# Patient Record
Sex: MALE | Race: WHITE | NOT HISPANIC OR LATINO | Employment: FULL TIME | URBAN - METROPOLITAN AREA
[De-identification: names, ages, dates, MRNs, and addresses within clinical notes are randomized per-mention and may not be internally consistent; named-entity substitution may affect disease eponyms.]

---

## 2017-07-20 ENCOUNTER — CLINICAL SUPPORT (OUTPATIENT)
Dept: OCCUPATIONAL MEDICINE | Facility: CLINIC | Age: 57
End: 2017-07-20

## 2017-07-20 DIAGNOSIS — Z02.1 PRE-EMPLOYMENT DRUG SCREENING: Primary | ICD-10-CM

## 2017-07-20 LAB — POC BREATH ALCOHOL: NEGATIVE

## 2017-07-20 PROCEDURE — 82075 ASSAY OF BREATH ETHANOL: CPT | Mod: S$GLB,,, | Performed by: NURSE PRACTITIONER

## 2017-07-20 PROCEDURE — 80305 DRUG TEST PRSMV DIR OPT OBS: CPT | Mod: S$GLB,,, | Performed by: NURSE PRACTITIONER

## 2017-07-24 ENCOUNTER — OFFICE VISIT (OUTPATIENT)
Dept: FAMILY MEDICINE | Facility: CLINIC | Age: 57
End: 2017-07-24
Payer: COMMERCIAL

## 2017-07-24 VITALS
BODY MASS INDEX: 39.79 KG/M2 | HEIGHT: 73 IN | DIASTOLIC BLOOD PRESSURE: 80 MMHG | SYSTOLIC BLOOD PRESSURE: 120 MMHG | WEIGHT: 300.25 LBS | TEMPERATURE: 98 F | HEART RATE: 66 BPM | OXYGEN SATURATION: 96 %

## 2017-07-24 DIAGNOSIS — R53.83 FATIGUE, UNSPECIFIED TYPE: ICD-10-CM

## 2017-07-24 DIAGNOSIS — E66.9 OBESITY (BMI 30-39.9): ICD-10-CM

## 2017-07-24 DIAGNOSIS — Z12.11 SCREENING FOR MALIGNANT NEOPLASM OF COLON: ICD-10-CM

## 2017-07-24 DIAGNOSIS — M25.572 CHRONIC PAIN OF LEFT ANKLE: ICD-10-CM

## 2017-07-24 DIAGNOSIS — M79.671 CHRONIC PAIN OF RIGHT HEEL: ICD-10-CM

## 2017-07-24 DIAGNOSIS — Z00.00 ANNUAL PHYSICAL EXAM: Primary | ICD-10-CM

## 2017-07-24 DIAGNOSIS — G89.29 CHRONIC PAIN OF RIGHT HEEL: ICD-10-CM

## 2017-07-24 DIAGNOSIS — G89.29 CHRONIC PAIN OF LEFT ANKLE: ICD-10-CM

## 2017-07-24 DIAGNOSIS — M50.30 DEGENERATIVE CERVICAL DISC: ICD-10-CM

## 2017-07-24 PROCEDURE — 99999 PR PBB SHADOW E&M-EST. PATIENT-LVL V: CPT | Mod: PBBFAC,,, | Performed by: FAMILY MEDICINE

## 2017-07-24 PROCEDURE — 99386 PREV VISIT NEW AGE 40-64: CPT | Mod: S$GLB,,, | Performed by: FAMILY MEDICINE

## 2017-07-24 RX ORDER — HYDROCODONE BITARTRATE AND ACETAMINOPHEN 7.5; 325 MG/1; MG/1
1 TABLET ORAL
Qty: 30 TABLET | Refills: 0 | Status: SHIPPED | OUTPATIENT
Start: 2017-07-24 | End: 2017-09-15 | Stop reason: SDUPTHER

## 2017-07-24 RX ORDER — GABAPENTIN 300 MG/1
300 CAPSULE ORAL 2 TIMES DAILY
Qty: 180 CAPSULE | Refills: 1 | Status: SHIPPED | OUTPATIENT
Start: 2017-07-24 | End: 2017-12-26 | Stop reason: SDUPTHER

## 2017-07-24 RX ORDER — NAPROXEN 500 MG/1
500 TABLET ORAL 2 TIMES DAILY
Qty: 60 TABLET | Refills: 0 | Status: SHIPPED | OUTPATIENT
Start: 2017-07-24 | End: 2017-09-15 | Stop reason: SDUPTHER

## 2017-07-24 NOTE — PROGRESS NOTES
Routine Office Visit    Patient Name: Srinivasan Munoz    : 1960  MRN: [id number]    Subjective:  Srinivasan is a 57 y.o. male who presents today for     1. Annual exam / establish care / new to me  2. Multiple arthralgias - pt has chronic right heel pain and chronic left ankle pain from previous injuries / fractures he has had in the past. He regularly sees orthopedist who have recommended that pt have surgery but pt unable to do so at this time due to need to work. He is unable to take time off for surgery. He states he has been working long 12 hour days and has had increase pain and swelling of both of his feet and ankles. Pt has been taking aleve with minimal relief of symptoms. No history of gout  3. Cervical neck pain - causing left elbow pain and numbness. Pt has tried elevating his elbow while driving with some relief of symptoms. He was previously given gabapentin in the past which helped with pain. Would like to restart   4. Fatigue / decrease sexual drive - pt is taking an otc supplement to help with low testosterone. He has not checked but feels he has been very tired, has decreased energy, increase weight gain and decrease sexual drive. He recently started otc supplement 2 weeks ago and has not notice noticeable improvement.     Review of Systems   Constitutional: Positive for malaise/fatigue. Negative for chills and fever.   HENT: Negative for congestion.    Eyes: Negative for blurred vision.   Respiratory: Negative for cough.    Cardiovascular: Negative for chest pain.   Gastrointestinal: Negative for abdominal pain, constipation, diarrhea, heartburn, nausea and vomiting.   Genitourinary: Negative for dysuria.   Musculoskeletal: Positive for joint pain and neck pain. Negative for myalgias.   Skin: Negative for itching and rash.   Neurological: Positive for weakness. Negative for dizziness and headaches.   Psychiatric/Behavioral: Negative for depression.       Active Problem List  Patient Active  "Problem List   Diagnosis    Obesity (BMI 30-39.9)    Chronic pain of right heel    Chronic pain of left ankle    Degenerative cervical disc       Past Surgical History  Past Surgical History:   Procedure Laterality Date    EYE SURGERY         Family History  History reviewed. No pertinent family history.    Social History  Social History     Social History    Marital status:      Spouse name: N/A    Number of children: N/A    Years of education: N/A     Occupational History    Not on file.     Social History Main Topics    Smoking status: Current Every Day Smoker     Packs/day: 2.00     Types: Cigarettes    Smokeless tobacco: Never Used    Alcohol use No    Drug use: Unknown    Sexual activity: Not on file     Other Topics Concern    Not on file     Social History Narrative    No narrative on file       Medications and Allergies  Reviewed and updated.   Current Outpatient Prescriptions   Medication Sig    gabapentin (NEURONTIN) 300 MG capsule Take 1 capsule (300 mg total) by mouth 2 (two) times daily.    hydrocodone-acetaminophen 7.5-325mg (NORCO) 7.5-325 mg per tablet Take 1 tablet by mouth every 24 hours as needed for Pain.    naproxen (NAPROSYN) 500 MG tablet Take 1 tablet (500 mg total) by mouth 2 (two) times daily.     No current facility-administered medications for this visit.        Physical Exam  /80 (BP Location: Right arm, Patient Position: Sitting, BP Method: Manual)   Pulse 66   Temp 98.1 °F (36.7 °C) (Oral)   Ht 6' 1" (1.854 m)   Wt (!) 136.2 kg (300 lb 4.3 oz)   SpO2 96%   BMI 39.62 kg/m²   Physical Exam   Constitutional: He is oriented to person, place, and time. He appears well-developed and well-nourished.   HENT:   Head: Normocephalic and atraumatic.   Eyes: Conjunctivae and EOM are normal. Pupils are equal, round, and reactive to light.   Neck: Normal range of motion. Neck supple. No JVD present. No thyromegaly present.   Cardiovascular: Normal rate, regular " rhythm and normal heart sounds.    Pulmonary/Chest: Effort normal and breath sounds normal. He has no wheezes.   Abdominal: Soft. Bowel sounds are normal. He exhibits no distension. There is no tenderness. There is no guarding.   Musculoskeletal: Normal range of motion.   Lymphadenopathy:     He has no cervical adenopathy.   Neurological: He is alert and oriented to person, place, and time.   Skin: Skin is warm and dry.   Psychiatric: He has a normal mood and affect. His behavior is normal.         Assessment/Plan:  Srinivasan Munoz is a 57 y.o. male who presents today for :    Problem List Items Addressed This Visit        Neuro    Chronic pain of right heel (Chronic)    Overview     Hx of fracture  Followed by ortho - who recommended surgery; pt decline  Continue conservative treatment with nsaids          Relevant Medications    naproxen (NAPROSYN) 500 MG tablet    hydrocodone-acetaminophen 7.5-325mg (NORCO) 7.5-325 mg per tablet  Common side effects of this medication were discussed with the patient. Questions regarding medications were discussed during this visit.       Chronic pain of left ankle (Chronic)    Overview     Hx of fracture  Followed by ortho - recommended surgery  Conservative treatment          Relevant Medications    naproxen (NAPROSYN) 500 MG tablet    hydrocodone-acetaminophen 7.5-325mg (NORCO) 7.5-325 mg per tablet  Common side effects of this medication were discussed with the patient. Questions regarding medications were discussed during this visit.   Advise medication should only be used for severe pain.        Fluids/Electrolytes/Nutrition/GI    Obesity (BMI 30-39.9) (Chronic)    Current Assessment & Plan     BMI noted  The patient is asked to make an attempt to improve diet and exercise patterns to aid in medical management of this problem.  Pt likes to eat little debbies with peanut butter; he also drinks 4-5 energy drinks per day  Recommend moderation and decrease snacks   Recommend  increase protein.             Musculoskeletal and Integument    Degenerative cervical disc    Overview     With radiculopathy  Pt previously on gabapentin; would like to restart         Relevant Medications    gabapentin (NEURONTIN) 300 MG capsule      Other Visit Diagnoses     Annual physical exam    -  Primary    Relevant Orders    Lipid panel    TSH    CBC auto differential    Comprehensive metabolic panel    Hemoglobin A1c    PSA, Screening    Uric acid    Screening for malignant neoplasm of colon        Relevant Orders    Case request GI: COLONOSCOPY (Completed)    Fatigue, unspecified type        Relevant Orders    Testosterone, free          Return in about 6 months (around 1/24/2018).

## 2017-07-24 NOTE — ASSESSMENT & PLAN NOTE
BMI noted  The patient is asked to make an attempt to improve diet and exercise patterns to aid in medical management of this problem.

## 2017-07-28 ENCOUNTER — LAB VISIT (OUTPATIENT)
Dept: LAB | Facility: HOSPITAL | Age: 57
End: 2017-07-28
Attending: FAMILY MEDICINE
Payer: COMMERCIAL

## 2017-07-28 DIAGNOSIS — Z00.00 ANNUAL PHYSICAL EXAM: ICD-10-CM

## 2017-07-28 DIAGNOSIS — R53.83 FATIGUE, UNSPECIFIED TYPE: ICD-10-CM

## 2017-07-28 LAB
ALBUMIN SERPL BCP-MCNC: 3.9 G/DL
ALP SERPL-CCNC: 98 U/L
ALT SERPL W/O P-5'-P-CCNC: 29 U/L
ANION GAP SERPL CALC-SCNC: 9 MMOL/L
AST SERPL-CCNC: 22 U/L
BASOPHILS # BLD AUTO: 0.09 K/UL
BASOPHILS NFR BLD: 0.9 %
BILIRUB SERPL-MCNC: 0.4 MG/DL
BUN SERPL-MCNC: 17 MG/DL
CALCIUM SERPL-MCNC: 9.4 MG/DL
CHLORIDE SERPL-SCNC: 108 MMOL/L
CHOLEST/HDLC SERPL: 4.2 {RATIO}
CO2 SERPL-SCNC: 25 MMOL/L
COMPLEXED PSA SERPL-MCNC: 0.24 NG/ML
CREAT SERPL-MCNC: 0.9 MG/DL
DIFFERENTIAL METHOD: ABNORMAL
EOSINOPHIL # BLD AUTO: 0.1 K/UL
EOSINOPHIL NFR BLD: 1 %
ERYTHROCYTE [DISTWIDTH] IN BLOOD BY AUTOMATED COUNT: 13.7 %
EST. GFR  (AFRICAN AMERICAN): >60 ML/MIN/1.73 M^2
EST. GFR  (NON AFRICAN AMERICAN): >60 ML/MIN/1.73 M^2
ESTIMATED AVG GLUCOSE: 117 MG/DL
GLUCOSE SERPL-MCNC: 115 MG/DL
HBA1C MFR BLD HPLC: 5.7 %
HCT VFR BLD AUTO: 45.2 %
HDL/CHOLESTEROL RATIO: 24 %
HDLC SERPL-MCNC: 167 MG/DL
HDLC SERPL-MCNC: 40 MG/DL
HGB BLD-MCNC: 14.8 G/DL
LDLC SERPL CALC-MCNC: 106.2 MG/DL
LYMPHOCYTES # BLD AUTO: 2.6 K/UL
LYMPHOCYTES NFR BLD: 25.7 %
MCH RBC QN AUTO: 28.6 PG
MCHC RBC AUTO-ENTMCNC: 32.7 G/DL
MCV RBC AUTO: 87 FL
MONOCYTES # BLD AUTO: 1.2 K/UL
MONOCYTES NFR BLD: 12.2 %
NEUTROPHILS # BLD AUTO: 6 K/UL
NEUTROPHILS NFR BLD: 59.7 %
NONHDLC SERPL-MCNC: 127 MG/DL
PLATELET # BLD AUTO: 320 K/UL
PMV BLD AUTO: 9.9 FL
POTASSIUM SERPL-SCNC: 4.3 MMOL/L
PROT SERPL-MCNC: 6.8 G/DL
RBC # BLD AUTO: 5.17 M/UL
SODIUM SERPL-SCNC: 142 MMOL/L
TRIGL SERPL-MCNC: 104 MG/DL
TSH SERPL DL<=0.005 MIU/L-ACNC: 2.37 UIU/ML
URATE SERPL-MCNC: 7.1 MG/DL
WBC # BLD AUTO: 10.04 K/UL

## 2017-07-28 PROCEDURE — 84402 ASSAY OF FREE TESTOSTERONE: CPT

## 2017-07-28 PROCEDURE — 85025 COMPLETE CBC W/AUTO DIFF WBC: CPT

## 2017-07-28 PROCEDURE — 83036 HEMOGLOBIN GLYCOSYLATED A1C: CPT

## 2017-07-28 PROCEDURE — 36415 COLL VENOUS BLD VENIPUNCTURE: CPT | Mod: PO

## 2017-07-28 PROCEDURE — 84443 ASSAY THYROID STIM HORMONE: CPT

## 2017-07-28 PROCEDURE — 80053 COMPREHEN METABOLIC PANEL: CPT

## 2017-07-28 PROCEDURE — 84153 ASSAY OF PSA TOTAL: CPT

## 2017-07-28 PROCEDURE — 84550 ASSAY OF BLOOD/URIC ACID: CPT

## 2017-07-28 PROCEDURE — 80061 LIPID PANEL: CPT

## 2017-07-31 LAB — TESTOST FREE SERPL-MCNC: 7.8 PG/ML

## 2017-08-01 DIAGNOSIS — R79.89 LOW TESTOSTERONE IN MALE: Primary | ICD-10-CM

## 2017-08-03 ENCOUNTER — TELEPHONE (OUTPATIENT)
Dept: FAMILY MEDICINE | Facility: CLINIC | Age: 57
End: 2017-08-03

## 2017-08-15 ENCOUNTER — OFFICE VISIT (OUTPATIENT)
Dept: UROLOGY | Facility: CLINIC | Age: 57
End: 2017-08-15
Payer: COMMERCIAL

## 2017-08-15 VITALS
WEIGHT: 299.63 LBS | SYSTOLIC BLOOD PRESSURE: 125 MMHG | BODY MASS INDEX: 39.71 KG/M2 | DIASTOLIC BLOOD PRESSURE: 82 MMHG | HEIGHT: 73 IN | HEART RATE: 65 BPM

## 2017-08-15 DIAGNOSIS — N52.9 ERECTILE DYSFUNCTION, UNSPECIFIED ERECTILE DYSFUNCTION TYPE: Primary | ICD-10-CM

## 2017-08-15 PROCEDURE — 99204 OFFICE O/P NEW MOD 45 MIN: CPT | Mod: S$GLB,,, | Performed by: UROLOGY

## 2017-08-15 PROCEDURE — 3008F BODY MASS INDEX DOCD: CPT | Mod: S$GLB,,, | Performed by: UROLOGY

## 2017-08-15 PROCEDURE — 99999 PR PBB SHADOW E&M-EST. PATIENT-LVL III: CPT | Mod: PBBFAC,,, | Performed by: UROLOGY

## 2017-08-15 RX ORDER — SILDENAFIL CITRATE 20 MG/1
20 TABLET ORAL 3 TIMES DAILY
Qty: 90 TABLET | Refills: 11 | Status: SHIPPED | OUTPATIENT
Start: 2017-08-15 | End: 2018-06-19 | Stop reason: CLARIF

## 2017-08-15 NOTE — PROGRESS NOTES
Subjective:       Patient ID: Srinivasan Munoz is a 57 y.o. male.    Chief Complaint: Low Testosterone (referred by  low testosterone levels on labs)    HPI patient is here for evaluation of erectile dysfunction.  He has a low free testosterone.  He has normal libido and is healthy but he does smoke cigarettes and I have asked him to stop smoking.  He has difficulty obtaining and maintaining an erection.  He's never tried sildenafil    Past Medical History:   Diagnosis Date    Fractured bone     right foot       Past Surgical History:   Procedure Laterality Date    EYE SURGERY         Family History   Problem Relation Age of Onset    Heart disease Father     Hypertension Father     Diabetes Mother     Stroke Mother     Cancer Mother     Hypertension Mother     Stroke Brother     Diabetes Brother        Social History     Social History    Marital status:      Spouse name: N/A    Number of children: N/A    Years of education: N/A     Occupational History    Not on file.     Social History Main Topics    Smoking status: Current Every Day Smoker     Packs/day: 2.00     Types: Cigarettes    Smokeless tobacco: Never Used    Alcohol use No    Drug use: No    Sexual activity: Yes     Partners: Female     Other Topics Concern    Not on file     Social History Narrative    No narrative on file       Allergies:  Penicillins    Medications:    Current Outpatient Prescriptions:     gabapentin (NEURONTIN) 300 MG capsule, Take 1 capsule (300 mg total) by mouth 2 (two) times daily., Disp: 180 capsule, Rfl: 1    hydrocodone-acetaminophen 7.5-325mg (NORCO) 7.5-325 mg per tablet, Take 1 tablet by mouth every 24 hours as needed for Pain., Disp: 30 tablet, Rfl: 0    naproxen (NAPROSYN) 500 MG tablet, Take 1 tablet (500 mg total) by mouth 2 (two) times daily., Disp: 60 tablet, Rfl: 0    sildenafil (REVATIO) 20 mg Tab, Take 1 tablet (20 mg total) by mouth 3 (three) times daily., Disp: 90 tablet,  Rfl: 11    Review of Systems   Constitutional: Negative for activity change, appetite change, chills, diaphoresis, fatigue, fever and unexpected weight change.   HENT: Negative for congestion, dental problem, hearing loss, mouth sores, postnasal drip, rhinorrhea, sinus pressure and trouble swallowing.    Eyes: Negative for pain, discharge and itching.   Respiratory: Negative for apnea, cough, choking, chest tightness, shortness of breath and wheezing.    Cardiovascular: Negative for chest pain, palpitations and leg swelling.   Gastrointestinal: Negative for abdominal distention, abdominal pain, anal bleeding, blood in stool, constipation, diarrhea, nausea, rectal pain and vomiting.   Endocrine: Negative for polydipsia and polyuria.   Genitourinary: Negative for decreased urine volume, difficulty urinating, discharge, dysuria, enuresis, flank pain, frequency, genital sores, hematuria, penile pain, penile swelling, scrotal swelling, testicular pain and urgency.   Musculoskeletal: Negative for arthralgias, back pain and myalgias.   Skin: Negative for color change, rash and wound.   Neurological: Negative for dizziness, syncope, speech difficulty, light-headedness and headaches.   Hematological: Negative for adenopathy. Does not bruise/bleed easily.   Psychiatric/Behavioral: Negative for behavioral problems, confusion, hallucinations and sleep disturbance.       Objective:      Physical Exam   Constitutional: He appears well-developed.   HENT:   Head: Normocephalic.   Cardiovascular: Normal rate.    Pulmonary/Chest: Effort normal.   Abdominal: Soft.   Genitourinary: Prostate normal.   Genitourinary Comments: 30 g benign no nodules.  PSA is normal   Neurological: He is alert.   Skin: Skin is warm.     Psychiatric: He has a normal mood and affect.       Assessment:       1. Erectile dysfunction, unspecified erectile dysfunction type        Plan:       Srinivasan was seen today for low testosterone.    Diagnoses and all  orders for this visit:    Erectile dysfunction, unspecified erectile dysfunction type  -     Testosterone; Future    Other orders  -     sildenafil (REVATIO) 20 mg Tab; Take 1 tablet (20 mg total) by mouth 3 (three) times daily.        patient will take 520 mg sildenafil's 1 hour prior to intercourse

## 2017-08-15 NOTE — LETTER
August 15, 2017      Yolanda Marques MD  [address]  [place] [address]           [facility]  [address]  [place] [address]  Phone: [phone]  Fax: [phone]          Patient: Srinivasan Munoz   MR Number: [id number]   YOB: 1960   Date of Visit: 8/15/2017       Dear Dr. Yolanda Marques:    Thank you for referring Srinivasan Munoz to me for evaluation. Attached you will find relevant portions of my assessment and plan of care.    If you have questions, please do not hesitate to call me. I look forward to following Srinivasan Munoz along with you.    Sincerely,    Johnny Bender Jr., MD    Enclosure  CC:  No Recipients    If you would like to receive this communication electronically, please contact [email or web address] or [phone] to request more information on [employer] access.    For providers and/or their staff who would like to refer a patient to [facility], please contact us through our one-stop-shop provider referral line, [facility], at [phone].    If you feel you have received this communication in error or would no longer like to receive these types of communications, please e-mail [email or web address]

## 2017-08-18 ENCOUNTER — OFFICE VISIT (OUTPATIENT)
Dept: FAMILY MEDICINE | Facility: CLINIC | Age: 57
End: 2017-08-18
Payer: COMMERCIAL

## 2017-08-18 ENCOUNTER — LAB VISIT (OUTPATIENT)
Dept: LAB | Facility: HOSPITAL | Age: 57
End: 2017-08-18
Attending: UROLOGY
Payer: COMMERCIAL

## 2017-08-18 VITALS
HEIGHT: 73 IN | WEIGHT: 302 LBS | TEMPERATURE: 97 F | OXYGEN SATURATION: 96 % | SYSTOLIC BLOOD PRESSURE: 110 MMHG | BODY MASS INDEX: 40.02 KG/M2 | HEART RATE: 96 BPM | DIASTOLIC BLOOD PRESSURE: 80 MMHG

## 2017-08-18 DIAGNOSIS — M75.22 BICEPS TENDONITIS, LEFT: ICD-10-CM

## 2017-08-18 DIAGNOSIS — M77.12 LATERAL EPICONDYLITIS OF LEFT ELBOW: ICD-10-CM

## 2017-08-18 DIAGNOSIS — N52.9 ERECTILE DYSFUNCTION, UNSPECIFIED ERECTILE DYSFUNCTION TYPE: ICD-10-CM

## 2017-08-18 DIAGNOSIS — E66.9 OBESITY (BMI 30-39.9): Chronic | ICD-10-CM

## 2017-08-18 DIAGNOSIS — M50.30 DEGENERATIVE CERVICAL DISC: Primary | ICD-10-CM

## 2017-08-18 LAB — TESTOST SERPL-MCNC: 325 NG/DL

## 2017-08-18 PROCEDURE — 3008F BODY MASS INDEX DOCD: CPT | Mod: S$GLB,,, | Performed by: FAMILY MEDICINE

## 2017-08-18 PROCEDURE — 99999 PR PBB SHADOW E&M-EST. PATIENT-LVL III: CPT | Mod: PBBFAC,,, | Performed by: FAMILY MEDICINE

## 2017-08-18 PROCEDURE — 84403 ASSAY OF TOTAL TESTOSTERONE: CPT

## 2017-08-18 PROCEDURE — 99214 OFFICE O/P EST MOD 30 MIN: CPT | Mod: S$GLB,,, | Performed by: FAMILY MEDICINE

## 2017-08-18 PROCEDURE — 36415 COLL VENOUS BLD VENIPUNCTURE: CPT | Mod: PO

## 2017-08-18 RX ORDER — CYCLOBENZAPRINE HCL 10 MG
10 TABLET ORAL NIGHTLY
Qty: 90 TABLET | Refills: 1 | Status: SHIPPED | OUTPATIENT
Start: 2017-08-18 | End: 2017-08-28

## 2017-08-18 NOTE — PATIENT INSTRUCTIONS
Understanding Biceps Tendonitis (Distal)    The biceps is the muscle on the front of the upper arm. Biceps tendons are connective tissue that attaches this muscle to the bones of the shoulder and arm. Overuse or a sudden injury to tendons can cause pain. When this problem occurs at the inner elbow, it is known as distal biceps tendonitis. Distal refers to the elbow end of the biceps, because it is farther away from center of the body.  Causes of biceps tendonitis  The biceps tendons can be damaged by:  · Lifting too heavy a weight  · Using your arms more than usual, such as training harder for a sport or doing a task that requires a lot of lifting  · Low-level stress on the tendons over time, especially repetitive motions  · Other injuries to the arm or elbow  Symptoms of biceps tendonitis  These may include:  · Pain or tenderness at the front of the elbow  · Pain that gets worse when bending the elbow or rotating the forearm  · Arm weakness  · A crackling sound or grating feeling when moving the elbow  Treatment for biceps tendonitis  This problem often gets better with rest and medicines. Treatments aim to reduce pain and help the tendon heal. Possible treatments include:  · Avoiding actions that make the pain worse to allow the elbow to rest  · Using over-the-counter or prescription pain medicine to help relieve pain and swelling  · Applying cold packs to help relieve pain and swelling  · Trying stretches and other exercises to help with range of motion and strength  If these treatments dont do enough to relieve symptoms, physical therapy may be helpful. For severe symptoms, or if the tendon ruptures, your healthcare provider may advise surgery to repair the tendon.       When to call your healthcare provider  Call your healthcare provider right away if you have any of these:  · Fever of 100.4°F (38°C) or higher, or as directed  · Symptoms that dont get better, or get worse  · New symptoms  · Bruising or  swelling of the injured arm  · Bulging appearance to the biceps muscle on the injured arm   Date Last Reviewed: 3/10/2016  © 5185-1398 [employer]. [address] [place] [address], [place] [address]. All rights reserved. This information is not intended as a substitute for professional medical care. Always follow your healthcare professional's instructions.        Tennis Elbow  Muscles connect to bones by thick, fibrous cords (tendons). When the muscles are overused by repeated motion, the tendons may become inflamed and painful. This condition is called tendonitis.  Tennis elbow (lateral epicondylitis) is a form of tendonitis. It occurs when the forearm muscles are used again and again in a twisting motion. Pain from tennis elbow occurs mainly on the outside of the elbow. But the pain can spread into the forearm and wrist. Your elbow may also be swollen and tender to the touch.  The pain may get worse when you move your arm or do simple activities. Bending your wrist back, shaking hands, or turning a doorknob may cause pain. The pain often gets worse after several weeks or months. Sometimes you may feel pain when your arm is still.  Tennis players who use a backhand stroke with poor technique are more likely to get tennis elbow. But playing tennis is only one cause of tennis elbow. Other common activities that can cause it include:  · Hammering  · Painting  · Raking  Besides tennis players, people at risk include , gardeners, musicians, and dentists. Sometimes people get tennis elbow without doing anything that would cause the injury.  Treatment includes resting the arm and taking anti-inflammatory medicines. Special splints help ease symptoms. Symptoms should get better after 4 to 6 weeks of rest. You may need steroid injections if resting and using a splint dont help. After the pain is relieved, you should change your activities so the symptoms dont return. You may need physical therapy. It may  include stretching, range-of-motion, and strengthening exercises. These treatments help most cases. You may need surgery if your symptoms continue for 6 months.  Home care  Follow these guidelines when caring for yourself at home:  · Rest your elbow as needed. Protect it from movement that causes pain. You may be told to use a forearm splint at night to ease symptoms in the morning. Your health care provider may recommend a special wrap or splint to compress the muscles of the forearm. This can ease pain during daytime activities. As your symptoms get better, start to move your elbow more.  · Put an ice pack on the injured area. Do this for 20 minutes every 1 to 2 hours the first day for pain relief. You can make an ice pack by wrapping a plastic bag of ice cubes in a thin towel. Continue using the ice pack 3 to 4 times a day for the next 2 days. Then use the ice pack as needed to ease pain and swelling.  · You may use acetaminophen or ibuprofen to control pain, unless another pain medicine was prescribed. If you have chronic liver or kidney disease, talk with your health care provider before using these medicines. Also talk with your provider if youve had a stomach ulcer or GI bleeding.  · After your elbow heals, avoid the motion that caused your pain. Or learn to move in a way that causes less stress on the tendon. Using a forearm wrap may keep tennis elbow from happening again.  · A tennis elbow strap may ease pain and keep you from further injury when you start playing tennis again. You can also lower your risk for injury by warming up before you play and cooling down afterward. You should also use the right equipment. For instance, make sure your racquet has the right  and is the right size for you.  Follow-up care  Follow up with your health care provider, or as advised, if your symptoms dont get better after 1 week of treatment.  When to seek medical advice  Call your health care provider right away if  any of these occur:  · Redness over the painful area  · Pain or swelling at the elbow gets worse  · Any numbness or tingling in your arm, hands, or fingers  · Unexplained fever over 101ºF (37.8ºC)   Date Last Reviewed: 2/17/2015  © 0533-6622 [employer]. [address] [place], [place] [address]. All rights reserved. This information is not intended as a substitute for professional medical care. Always follow your healthcare professional's instructions.

## 2017-08-19 NOTE — PROGRESS NOTES
Routine Office Visit    Patient Name: Srinivasan Munoz    : 1960  MRN: [id number]    Subjective:  Srinivasan is a 57 y.o. male who presents today for     1. Left elbow pain - started 1 month ago. Pain is described as an ache / cramp  that is severe and radiates from his elbow to his shoulder. Pain is worse at night and wakes patient up in the middle of the night. He has been taking neurontin and nsaids without relief of symptoms. He bought a compression wrap which seems to help with the pain. Pt works in construction. He denies any trauma or injuries. He does have two disc in his neck that are touching and he is wondering if that is the cause of his elbow pain.     Review of Systems   Constitutional: Negative for chills and fever.   HENT: Negative for congestion.    Eyes: Negative for blurred vision.   Respiratory: Negative for cough.    Cardiovascular: Negative for chest pain.   Gastrointestinal: Negative for abdominal pain, constipation, diarrhea, heartburn, nausea and vomiting.   Genitourinary: Negative for dysuria.   Musculoskeletal: Positive for joint pain. Negative for myalgias.   Skin: Negative for itching and rash.   Neurological: Negative for dizziness and headaches.   Psychiatric/Behavioral: Negative for depression.       Active Problem List  Patient Active Problem List   Diagnosis    Obesity (BMI 30-39.9)    Chronic pain of right heel    Chronic pain of left ankle    Degenerative cervical disc       Past Surgical History  Past Surgical History:   Procedure Laterality Date    EYE SURGERY         Family History  Family History   Problem Relation Age of Onset    Heart disease Father     Hypertension Father     Diabetes Mother     Stroke Mother     Cancer Mother     Hypertension Mother     Stroke Brother     Diabetes Brother        Social History  Social History     Social History    Marital status:      Spouse name: N/A    Number of children: N/A    Years of education: N/A  "    Occupational History    Not on file.     Social History Main Topics    Smoking status: Current Every Day Smoker     Packs/day: 2.00     Types: Cigarettes    Smokeless tobacco: Never Used    Alcohol use No    Drug use: No    Sexual activity: Yes     Partners: Female     Other Topics Concern    Not on file     Social History Narrative    No narrative on file       Medications and Allergies  Reviewed and updated.   Current Outpatient Prescriptions   Medication Sig    gabapentin (NEURONTIN) 300 MG capsule Take 1 capsule (300 mg total) by mouth 2 (two) times daily.    hydrocodone-acetaminophen 7.5-325mg (NORCO) 7.5-325 mg per tablet Take 1 tablet by mouth every 24 hours as needed for Pain.    naproxen (NAPROSYN) 500 MG tablet Take 1 tablet (500 mg total) by mouth 2 (two) times daily.    sildenafil (REVATIO) 20 mg Tab Take 1 tablet (20 mg total) by mouth 3 (three) times daily.    cyclobenzaprine (FLEXERIL) 10 MG tablet Take 1 tablet (10 mg total) by mouth every evening.     No current facility-administered medications for this visit.        Physical Exam  /80 (BP Location: Right arm, Patient Position: Sitting, BP Method: Large (Manual))   Pulse 96   Temp 97 °F (36.1 °C) (Oral)   Ht 6' 1" (1.854 m)   Wt (!) 137 kg (302 lb 0.5 oz)   SpO2 96%   BMI 39.85 kg/m²   Physical Exam   Constitutional: He is oriented to person, place, and time. He appears well-developed and well-nourished.   HENT:   Head: Normocephalic and atraumatic.   Eyes: Conjunctivae and EOM are normal. Pupils are equal, round, and reactive to light.   Neck: Normal range of motion. Neck supple. No JVD present. No thyromegaly present.   Cardiovascular: Normal rate, regular rhythm and normal heart sounds.    Pulmonary/Chest: Effort normal and breath sounds normal. He has no wheezes.   Abdominal: Soft. Bowel sounds are normal. He exhibits no distension. There is no tenderness. There is no guarding.   Musculoskeletal: Normal range of " motion.        Left elbow: He exhibits normal range of motion and no swelling. Tenderness found.   Lymphadenopathy:     He has no cervical adenopathy.   Neurological: He is alert and oriented to person, place, and time.   Skin: Skin is warm and dry.   Psychiatric: He has a normal mood and affect. His behavior is normal.         Assessment/Plan:  Srinivasan Munoz is a 57 y.o. male who presents today for :    Degenerative cervical disc  Still causes patient pain / numbness / tingling  Continue neurontin   May need to increase dosage of neurontin; will try starting flexeril first  Common side effects of this medication were discussed with the patient. Questions regarding medications were discussed during this visit.     Obesity (BMI 30-39.9)  BMI noted     Biceps tendonitis, left / Lateral epicondylitis of left elbow  -     cyclobenzaprine (FLEXERIL) 10 MG tablet; Take 1 tablet (10 mg total) by mouth every evening.  Dispense: 90 tablet; Refill: 1  RICE therapy  Conservative treatment; try eagle brand medicated oil   Continue nsaids prn         Return if symptoms worsen or fail to improve.

## 2017-09-15 DIAGNOSIS — M25.572 CHRONIC PAIN OF LEFT ANKLE: ICD-10-CM

## 2017-09-15 DIAGNOSIS — M79.671 CHRONIC PAIN OF RIGHT HEEL: ICD-10-CM

## 2017-09-15 DIAGNOSIS — G89.29 CHRONIC PAIN OF RIGHT HEEL: ICD-10-CM

## 2017-09-15 DIAGNOSIS — G89.29 CHRONIC PAIN OF LEFT ANKLE: ICD-10-CM

## 2017-09-15 RX ORDER — NAPROXEN 500 MG/1
500 TABLET ORAL 2 TIMES DAILY
Qty: 60 TABLET | Refills: 0 | Status: SHIPPED | OUTPATIENT
Start: 2017-09-15 | End: 2017-11-11 | Stop reason: SDUPTHER

## 2017-09-15 RX ORDER — HYDROCODONE BITARTRATE AND ACETAMINOPHEN 7.5; 325 MG/1; MG/1
1 TABLET ORAL
Qty: 30 TABLET | Refills: 0 | Status: SHIPPED | OUTPATIENT
Start: 2017-09-15 | End: 2018-01-03 | Stop reason: SDUPTHER

## 2017-09-15 NOTE — TELEPHONE ENCOUNTER
----- Message from Chico Velazquez sent at 9/15/2017 10:08 AM CDT -----  Contact: Self  REFILL: hydrocodone-acetaminophen 7.5-325mg (NORCO) 7.5-325 mg per tablet  naproxen (NAPROSYN) 500 MG tablet    Please notify pt when complete. Pt can be reached @ [phone].

## 2017-11-11 DIAGNOSIS — G89.29 CHRONIC PAIN OF RIGHT HEEL: ICD-10-CM

## 2017-11-11 DIAGNOSIS — M79.671 CHRONIC PAIN OF RIGHT HEEL: ICD-10-CM

## 2017-11-11 DIAGNOSIS — G89.29 CHRONIC PAIN OF LEFT ANKLE: ICD-10-CM

## 2017-11-11 DIAGNOSIS — M25.572 CHRONIC PAIN OF LEFT ANKLE: ICD-10-CM

## 2017-11-12 RX ORDER — NAPROXEN 500 MG/1
TABLET ORAL
Qty: 60 TABLET | Refills: 0 | Status: SHIPPED | OUTPATIENT
Start: 2017-11-12 | End: 2018-01-03 | Stop reason: SDUPTHER

## 2017-12-26 ENCOUNTER — TELEPHONE (OUTPATIENT)
Dept: FAMILY MEDICINE | Facility: CLINIC | Age: 57
End: 2017-12-26

## 2017-12-26 DIAGNOSIS — G89.29 CHRONIC PAIN OF RIGHT HEEL: ICD-10-CM

## 2017-12-26 DIAGNOSIS — M50.30 DEGENERATIVE CERVICAL DISC: Primary | ICD-10-CM

## 2017-12-26 DIAGNOSIS — G89.29 CHRONIC PAIN OF LEFT ANKLE: ICD-10-CM

## 2017-12-26 DIAGNOSIS — M50.30 DEGENERATIVE CERVICAL DISC: ICD-10-CM

## 2017-12-26 DIAGNOSIS — M25.572 CHRONIC PAIN OF LEFT ANKLE: ICD-10-CM

## 2017-12-26 DIAGNOSIS — M79.671 CHRONIC PAIN OF RIGHT HEEL: ICD-10-CM

## 2017-12-26 RX ORDER — GABAPENTIN 300 MG/1
300 CAPSULE ORAL 2 TIMES DAILY
Qty: 180 CAPSULE | Refills: 1 | Status: SHIPPED | OUTPATIENT
Start: 2017-12-26 | End: 2018-01-04

## 2017-12-26 RX ORDER — GABAPENTIN 300 MG/1
CAPSULE ORAL
Qty: 180 CAPSULE | Refills: 1 | Status: SHIPPED | OUTPATIENT
Start: 2017-12-26 | End: 2017-12-26 | Stop reason: SDUPTHER

## 2017-12-26 NOTE — TELEPHONE ENCOUNTER
----- Message from Yolanda Marques MD sent at 12/26/2017  1:49 PM CST -----  Will forward this to staff. Please send as refill request. Thank you    ----- Message -----  From: Tamiko Santiago  Sent: 12/26/2017   1:20 PM  To: Yolanda Marques MD    Pt ask that Dr. marques nurse to please call him about getting a refill on his meds ASAP pt is out of his meds. [phone] (Gabapentin 300MG cap amn

## 2018-01-03 NOTE — TELEPHONE ENCOUNTER
----- Message from Oksana Pizarro sent at 1/3/2018  4:04 PM CST -----  Contact: Self  Pt calling regarding med below. [phone] or [phone].        gabapentin (NEURONTIN) 300 MG capsule

## 2018-01-03 NOTE — TELEPHONE ENCOUNTER
Patient requesting 800 mg tablet of gabapentin once daily instead of taking two tablets 2 times daily.     Patient has an appt with Dr Marques scheduled on 01/16/18 10:30am.     Please Advise

## 2018-01-04 RX ORDER — NAPROXEN 500 MG/1
500 TABLET ORAL 2 TIMES DAILY
Qty: 180 TABLET | Refills: 0 | Status: SHIPPED | OUTPATIENT
Start: 2018-01-04 | End: 2018-04-16 | Stop reason: SDUPTHER

## 2018-01-04 RX ORDER — GABAPENTIN 600 MG/1
600 TABLET ORAL 2 TIMES DAILY
Qty: 180 TABLET | Refills: 0 | Status: SHIPPED | OUTPATIENT
Start: 2018-01-04 | End: 2018-09-10 | Stop reason: SDUPTHER

## 2018-01-04 RX ORDER — GABAPENTIN 800 MG/1
800 TABLET ORAL DAILY
Qty: 90 TABLET | Refills: 1 | Status: SHIPPED | OUTPATIENT
Start: 2018-01-04 | End: 2018-01-04

## 2018-01-04 RX ORDER — HYDROCODONE BITARTRATE AND ACETAMINOPHEN 7.5; 325 MG/1; MG/1
1 TABLET ORAL
Qty: 30 TABLET | Refills: 0 | Status: SHIPPED | OUTPATIENT
Start: 2018-01-04 | End: 2018-04-16 | Stop reason: SDUPTHER

## 2018-01-04 NOTE — TELEPHONE ENCOUNTER
Patient said he take Gabapentin 4 times a day he take 2 in the am and 2 in pm he new Rx sent to pharmacy.

## 2018-03-27 ENCOUNTER — OFFICE VISIT (OUTPATIENT)
Dept: OPTOMETRY | Facility: CLINIC | Age: 58
End: 2018-03-27
Payer: COMMERCIAL

## 2018-03-27 DIAGNOSIS — H25.12 NUCLEAR SCLEROSIS OF LEFT EYE: ICD-10-CM

## 2018-03-27 DIAGNOSIS — H52.7 REFRACTIVE ERROR: ICD-10-CM

## 2018-03-27 DIAGNOSIS — H21.531: ICD-10-CM

## 2018-03-27 DIAGNOSIS — T85.22XA DISLOCATED INTRAOCULAR LENS, INITIAL ENCOUNTER: Primary | ICD-10-CM

## 2018-03-27 PROCEDURE — 99999 PR PBB SHADOW E&M-EST. PATIENT-LVL II: CPT | Mod: PBBFAC,,, | Performed by: OPTOMETRIST

## 2018-03-27 PROCEDURE — 92015 DETERMINE REFRACTIVE STATE: CPT | Mod: S$GLB,,, | Performed by: OPTOMETRIST

## 2018-03-27 PROCEDURE — 92004 COMPRE OPH EXAM NEW PT 1/>: CPT | Mod: S$GLB,,, | Performed by: OPTOMETRIST

## 2018-03-27 NOTE — PROGRESS NOTES
Subjective:       Patient ID: Srinivasan Munoz is a 58 y.o. male      Chief Complaint   Patient presents with    Concerns About Ocular Health     History of Present Illness  Dls: ? Yrs     Pt c/o blurry vision at distance and near ou. Pt does not wear any   glasses.  Pt states no tearing  itching ou burning ou no pain no ha's no floaters.     Eye meds:  None         Assessment/Plan:     1. Dislocated intraocular lens, initial encounter  Inferiorly decentered. Pt reports vision use to be better in the past. Discussed with pt, referral to Dr. Pa for evaluation.  - Ambulatory referral to Ophthalmology    2. Nuclear sclerosis of left eye  Educated pt on presence of cataracts and effects on vision. No surgery at this time. Recheck in one year.    3. Traumatic iridodialysis, right  H/o of nail trauma into OD x 20 years ago. Pt had 11 surgeries. Doing well.    4. Refractive error  Educated patient on refractive error and discussed lens options. Pt to hold filling Rx til after consult with Dr. Pa. Dispensed updated spectacle Rx. Educated about adaptation period to new specs.    Eyeglass Final Rx     Eyeglass Final Rx       Sphere Cylinder Axis Add    Right -0.25 Sphere  +2.50    Left -2.50 +0.75 045 +2.50    Expiration Date:  3/28/2019                Follow-up for Dr. Pa for decentered IOL OD.

## 2018-04-06 ENCOUNTER — OFFICE VISIT (OUTPATIENT)
Dept: FAMILY MEDICINE | Facility: CLINIC | Age: 58
End: 2018-04-06
Payer: COMMERCIAL

## 2018-04-06 VITALS
OXYGEN SATURATION: 97 % | HEART RATE: 68 BPM | TEMPERATURE: 98 F | WEIGHT: 310 LBS | SYSTOLIC BLOOD PRESSURE: 104 MMHG | HEIGHT: 73 IN | BODY MASS INDEX: 41.08 KG/M2 | DIASTOLIC BLOOD PRESSURE: 72 MMHG

## 2018-04-06 DIAGNOSIS — M67.912 TENDINOPATHY OF LEFT ROTATOR CUFF: ICD-10-CM

## 2018-04-06 DIAGNOSIS — M50.30 DEGENERATIVE CERVICAL DISC: ICD-10-CM

## 2018-04-06 DIAGNOSIS — M75.22 BICEPS TENDONITIS, LEFT: Primary | ICD-10-CM

## 2018-04-06 PROCEDURE — 99999 PR PBB SHADOW E&M-EST. PATIENT-LVL IV: CPT | Mod: PBBFAC,,, | Performed by: NURSE PRACTITIONER

## 2018-04-06 PROCEDURE — 99214 OFFICE O/P EST MOD 30 MIN: CPT | Mod: S$GLB,,, | Performed by: NURSE PRACTITIONER

## 2018-04-06 RX ORDER — PREDNISONE 20 MG/1
20 TABLET ORAL DAILY
Qty: 10 TABLET | Refills: 0 | Status: SHIPPED | OUTPATIENT
Start: 2018-04-06 | End: 2018-04-16

## 2018-04-06 RX ORDER — GABAPENTIN 800 MG/1
TABLET ORAL
COMMUNITY
Start: 2018-04-02 | End: 2018-06-19 | Stop reason: CLARIF

## 2018-04-06 NOTE — PATIENT INSTRUCTIONS
Understanding Biceps Tendonitis (Distal)    The biceps is the muscle on the front of the upper arm. Biceps tendons are connective tissue that attaches this muscle to the bones of the shoulder and arm. Overuse or a sudden injury to tendons can cause pain. When this problem occurs at the inner elbow, it is known as distal biceps tendonitis. Distal refers to the elbow end of the biceps, because it is farther away from center of the body.  Causes of biceps tendonitis  The biceps tendons can be damaged by:  · Lifting too heavy a weight  · Using your arms more than usual, such as training harder for a sport or doing a task that requires a lot of lifting  · Low-level stress on the tendons over time, especially repetitive motions  · Other injuries to the arm or elbow  Symptoms of biceps tendonitis  These may include:  · Pain or tenderness at the front of the elbow  · Pain that gets worse when bending the elbow or rotating the forearm  · Arm weakness  · A crackling sound or grating feeling when moving the elbow  Treatment for biceps tendonitis  This problem often gets better with rest and medicines. Treatments aim to reduce pain and help the tendon heal. Possible treatments include:  · Avoiding actions that make the pain worse to allow the elbow to rest  · Using over-the-counter or prescription pain medicine to help relieve pain and swelling  · Applying cold packs to help relieve pain and swelling  · Trying stretches and other exercises to help with range of motion and strength  If these treatments dont do enough to relieve symptoms, physical therapy may be helpful. For severe symptoms, or if the tendon ruptures, your healthcare provider may advise surgery to repair the tendon.       When to call your healthcare provider  Call your healthcare provider right away if you have any of these:  · Fever of 100.4°F (38°C) or higher, or as directed  · Symptoms that dont get better, or get worse  · New symptoms  · Bruising or  swelling of the injured arm  · Bulging appearance to the biceps muscle on the injured arm   Date Last Reviewed: 3/10/2016  © 0518-6983 [employer]. [address] [place] [address], [place] [address]. All rights reserved. This information is not intended as a substitute for professional medical care. Always follow your healthcare professional's instructions.        Understanding Biceps Tendonitis    A tendon is a strong band of tissue that connects muscle to bone. The biceps muscle is in the front of the upper arm. It helps with movements such as bending the elbow or raising the arm. The upper end of the biceps muscle is called the proximal end. It has two tendons called the long head and the short head. These tendons attach the muscle to the bones in the shoulder. Biceps tendonitis occurs when either of these tendons is irritated or red and swollen (inflamed). Most cases involve the long head.  Causes of biceps tendonitis  Causes can include:  · Wear and tear of the tendon from aging or normal use over time  · Overuse of the tendon from sports or work activities, especially those that involve repeated overhead movements  · Injury to the tendon from a fall or other accident  · Other problems in the shoulder, such as shoulder impingement or a rotator cuff tear  Symptoms of biceps tendonitis  Common symptoms include:  · Pain in the front of the shoulder that may also travel down the arm. The pain may be worse with activity and at night.  · Swelling in the shoulder  · Clicking or catching sensation when using the arm and shoulder  · Trouble moving the arm and shoulder  Treating biceps tendonitis  Treatment for biceps tendonitis may include:  · Resting the arm and shoulder. This involves limiting certain movements, such as reaching above the head or raising the arm. These can slow healing and make symptoms worse. You may also need to limit certain sports and types of work for a time.  · Cold therapy. This involves  using items such as ice packs to help relieve symptoms. Cold can help reduce pain and swelling.  · Medicines. These help relieve pain and swelling. NSAIDs (nonsteroidal anti-inflammatory drugs) are the most common medicines used. Medicines may be prescribed or bought over-the-counter. They may be given as pills. Or they may be applied to the skin in the form of a gel, cream, or patch.  · Injections of medicine into the injured area. These help relieve pain and swelling for a time.  · Physical therapy and exercises.  These help improve strength and range of motion in the arm and shoulder.  Possible complications  · If the tendon isnt given time to heal, symptoms may worsen. Also, the tendon may tear (rupture).  · If the tendon ruptures or doesnt get better with treatment, your healthcare provider may recommend surgery. This most often involves repairing and reattaching the tendon.     When to call your healthcare provider  Call your healthcare provider right away if you have any of these:  · Fever of 100.4°F (38°C) or higher, or as directed  · Symptoms that dont get better with treatment, or get worse  · Weakness or instability in the arm or shoulder  · Sudden sharp pain, bruising, swelling, popping or snapping sensation, or bulge in the upper arm or shoulder  · New symptoms   Date Last Reviewed: 3/10/2016  © 7131-4116 [employer]. [address] [place] [address], [place] [address]. All rights reserved. This information is not intended as a substitute for professional medical care. Always follow your healthcare professional's instructions.

## 2018-04-16 ENCOUNTER — OFFICE VISIT (OUTPATIENT)
Dept: FAMILY MEDICINE | Facility: CLINIC | Age: 58
End: 2018-04-16
Payer: COMMERCIAL

## 2018-04-16 VITALS
BODY MASS INDEX: 41.26 KG/M2 | WEIGHT: 311.31 LBS | TEMPERATURE: 98 F | OXYGEN SATURATION: 98 % | HEIGHT: 73 IN | DIASTOLIC BLOOD PRESSURE: 82 MMHG | SYSTOLIC BLOOD PRESSURE: 120 MMHG | HEART RATE: 71 BPM

## 2018-04-16 DIAGNOSIS — M25.572 CHRONIC PAIN OF LEFT ANKLE: ICD-10-CM

## 2018-04-16 DIAGNOSIS — M62.830 MUSCLE SPASM OF BACK: Primary | ICD-10-CM

## 2018-04-16 DIAGNOSIS — M79.671 CHRONIC PAIN OF RIGHT HEEL: ICD-10-CM

## 2018-04-16 DIAGNOSIS — G89.29 CHRONIC PAIN OF LEFT ANKLE: ICD-10-CM

## 2018-04-16 DIAGNOSIS — G89.29 CHRONIC PAIN OF RIGHT HEEL: ICD-10-CM

## 2018-04-16 PROCEDURE — 99214 OFFICE O/P EST MOD 30 MIN: CPT | Mod: 25,S$GLB,, | Performed by: FAMILY MEDICINE

## 2018-04-16 PROCEDURE — 96372 THER/PROPH/DIAG INJ SC/IM: CPT | Mod: S$GLB,,, | Performed by: FAMILY MEDICINE

## 2018-04-16 PROCEDURE — 99999 PR PBB SHADOW E&M-EST. PATIENT-LVL III: CPT | Mod: PBBFAC,,, | Performed by: FAMILY MEDICINE

## 2018-04-16 RX ORDER — TIZANIDINE 4 MG/1
4 TABLET ORAL EVERY 8 HOURS PRN
Qty: 30 TABLET | Refills: 0 | Status: SHIPPED | OUTPATIENT
Start: 2018-04-16 | End: 2018-04-26

## 2018-04-16 RX ORDER — KETOROLAC TROMETHAMINE 30 MG/ML
60 INJECTION, SOLUTION INTRAMUSCULAR; INTRAVENOUS
Status: COMPLETED | OUTPATIENT
Start: 2018-04-16 | End: 2018-04-16

## 2018-04-16 RX ORDER — NAPROXEN 500 MG/1
500 TABLET ORAL 2 TIMES DAILY
Qty: 180 TABLET | Refills: 0 | Status: SHIPPED | OUTPATIENT
Start: 2018-04-16 | End: 2018-09-10

## 2018-04-16 RX ORDER — HYDROCODONE BITARTRATE AND ACETAMINOPHEN 7.5; 325 MG/1; MG/1
1 TABLET ORAL
Qty: 30 TABLET | Refills: 0 | Status: SHIPPED | OUTPATIENT
Start: 2018-04-16 | End: 2018-06-15 | Stop reason: SDUPTHER

## 2018-04-16 RX ADMIN — KETOROLAC TROMETHAMINE 60 MG: 30 INJECTION, SOLUTION INTRAMUSCULAR; INTRAVENOUS at 09:04

## 2018-04-16 NOTE — PROGRESS NOTES
[facility]  Progress Note    SUBJECTIVE:     Chief Complaint   Patient presents with    Fall     partial fall patient didn't hit the floor. He slipped off the chair & fell into the refridgerator.        HPI   Srinivasan Munoz  is a 58 y.o. male here for c/o leftl ower back pain. He slipped off a chair and landed on a small refrigerator as he missed a step. Did not hit head or LOC. Rates pain as 9/10, which may radiate up/down lower back. Hasn't tried anything for it. Certain positions make it worst. Tried heat packs last night without much help.    Review of patient's allergies indicates:   Allergen Reactions    Penicillins Hives       Past Medical History:   Diagnosis Date    Arthritis     Eye injury 20 yrs ago    nail stuck in od     Fractured bone     right foot    Retinal detachment 20 yrs ago     od      Past Surgical History:   Procedure Laterality Date    CATARACT EXTRACTION Right     EYE SURGERY      REFRACTIVE SURGERY Bilateral 18 yrs    RETINAL DETACHMENT SURGERY Right 20 yrs ago     Family History   Problem Relation Age of Onset    Heart disease Father     Hypertension Father     Diabetes Mother     Stroke Mother     Cancer Mother     Hypertension Mother     Stroke Brother     Diabetes Brother     No Known Problems Sister     No Known Problems Maternal Aunt     No Known Problems Maternal Uncle     No Known Problems Paternal Aunt     No Known Problems Paternal Uncle     No Known Problems Maternal Grandmother     No Known Problems Maternal Grandfather     No Known Problems Paternal Grandmother     No Known Problems Paternal Grandfather     Amblyopia Neg Hx     Blindness Neg Hx     Cataracts Neg Hx     Glaucoma Neg Hx     Macular degeneration Neg Hx     Retinal detachment Neg Hx     Strabismus Neg Hx     Thyroid disease Neg Hx      Social History   Substance Use Topics    Smoking status: Current Every Day Smoker     Packs/day: 2.00     Types: Cigarettes     Smokeless tobacco: Never Used    Alcohol use No        Review of Systems   Constitutional: Negative for chills, fever and malaise/fatigue.   Respiratory: Negative.    Cardiovascular: Negative.    Gastrointestinal: Negative for abdominal pain, constipation and diarrhea.   Musculoskeletal: Positive for back pain. Negative for falls, myalgias and neck pain.   Neurological: Negative for weakness and headaches.     OBJECTIVE:     Vitals:    04/16/18 0840   BP: 120/82   Pulse: 71   Temp: 97.7 °F (36.5 °C)     Body mass index is 41.07 kg/m².    Physical Exam   Constitutional: He is oriented to person, place, and time. He appears distressed (mild).   HENT:   Head: Normocephalic and atraumatic.   Neck: Normal range of motion. Neck supple.   Musculoskeletal: He exhibits tenderness (to palpation of paraspinal muscles). He exhibits no edema.   Neurological: He is alert and oriented to person, place, and time. No cranial nerve deficit.   Skin: Skin is warm. He is not diaphoretic.       Old records were reviewed. Labs and/or images were independently reviewed.    ASSESSMENT     1. Muscle spasm of back    2. Chronic pain of right heel    3. Chronic pain of left ankle        PLAN:     Muscle spasm of back  -     tiZANidine (ZANAFLEX) 4 MG tablet; Take 1 tablet (4 mg total) by mouth every 8 (eight) hours as needed.  Dispense: 30 tablet; Refill: 0  -     ketorolac injection 60 mg; Inject 2 mLs (60 mg total) into the muscle one time.  -     Patient counseled on good posture and stretching exercises. Continue to place ice packs on affected areas 3-4 times daily. Take medications as prescribed. Instructed patient to call MD if symptoms persist or worsen.    Chronic pain of right heel  -     hydrocodone-acetaminophen 7.5-325mg (NORCO) 7.5-325 mg per tablet; Take 1 tablet by mouth every 24 hours as needed for Pain.  Dispense: 30 tablet; Refill: 0  -     naproxen (NAPROSYN) 500 MG tablet; Take 1 tablet (500 mg total) by mouth 2 (two)  times daily.  Dispense: 180 tablet; Refill: 0    Chronic pain of left ankle  -     hydrocodone-acetaminophen 7.5-325mg (NORCO) 7.5-325 mg per tablet; Take 1 tablet by mouth every 24 hours as needed for Pain.  Dispense: 30 tablet; Refill: 0  -     naproxen (NAPROSYN) 500 MG tablet; Take 1 tablet (500 mg total) by mouth 2 (two) times daily.  Dispense: 180 tablet; Refill: 0      RTC LEON Mata MD  04/16/2018 8:58 AM

## 2018-04-17 ENCOUNTER — TELEPHONE (OUTPATIENT)
Dept: FAMILY MEDICINE | Facility: CLINIC | Age: 58
End: 2018-04-17

## 2018-04-17 ENCOUNTER — HOSPITAL ENCOUNTER (OUTPATIENT)
Dept: RADIOLOGY | Facility: HOSPITAL | Age: 58
Discharge: HOME | End: 2018-04-17
Attending: FAMILY MEDICINE
Payer: COMMERCIAL

## 2018-04-17 DIAGNOSIS — M54.50 LOW BACK PAIN, NON-SPECIFIC: ICD-10-CM

## 2018-04-17 NOTE — TELEPHONE ENCOUNTER
----- Message from Lali Hardy sent at 4/17/2018  7:43 AM CDT -----  Contact: self  [phone]  Pt was seen on 4-16-18, he is requesting a xray due to extreme back pain. Pls call pt [phone]. Thanks......Aviva

## 2018-04-17 NOTE — TELEPHONE ENCOUNTER
Spoke with pt informed him that we are unable to do the xray here due to the weight limit. Pt didn't want to have test done anywhere else. He just wanted to cancel the appt

## 2018-05-01 ENCOUNTER — INITIAL CONSULT (OUTPATIENT)
Dept: OPHTHALMOLOGY | Facility: CLINIC | Age: 58
End: 2018-05-01
Attending: OPHTHALMOLOGY
Payer: COMMERCIAL

## 2018-05-01 ENCOUNTER — TELEPHONE (OUTPATIENT)
Dept: FAMILY MEDICINE | Facility: CLINIC | Age: 58
End: 2018-05-01

## 2018-05-01 ENCOUNTER — OFFICE VISIT (OUTPATIENT)
Dept: FAMILY MEDICINE | Facility: CLINIC | Age: 58
End: 2018-05-01
Payer: COMMERCIAL

## 2018-05-01 VITALS
HEART RATE: 73 BPM | HEIGHT: 73 IN | SYSTOLIC BLOOD PRESSURE: 130 MMHG | OXYGEN SATURATION: 94 % | BODY MASS INDEX: 41.02 KG/M2 | WEIGHT: 309.5 LBS | DIASTOLIC BLOOD PRESSURE: 92 MMHG | TEMPERATURE: 99 F

## 2018-05-01 DIAGNOSIS — H33.21 RD (RETINAL DETACHMENT), RIGHT: ICD-10-CM

## 2018-05-01 DIAGNOSIS — M54.50 LOW BACK PAIN RADIATING TO LEFT LOWER EXTREMITY: ICD-10-CM

## 2018-05-01 DIAGNOSIS — M79.605 LOW BACK PAIN RADIATING TO LEFT LOWER EXTREMITY: ICD-10-CM

## 2018-05-01 DIAGNOSIS — H25.12 NUCLEAR SCLEROSIS OF LEFT EYE: Primary | ICD-10-CM

## 2018-05-01 DIAGNOSIS — M25.552 LEFT HIP PAIN: Primary | ICD-10-CM

## 2018-05-01 DIAGNOSIS — Z98.890 S/P LASIK (LASER ASSISTED IN SITU KERATOMILEUSIS): ICD-10-CM

## 2018-05-01 PROCEDURE — 99214 OFFICE O/P EST MOD 30 MIN: CPT | Mod: S$GLB,,, | Performed by: FAMILY MEDICINE

## 2018-05-01 PROCEDURE — 99999 PR PBB SHADOW E&M-EST. PATIENT-LVL II: CPT | Mod: PBBFAC,,, | Performed by: OPHTHALMOLOGY

## 2018-05-01 PROCEDURE — 92136 OPHTHALMIC BIOMETRY: CPT | Mod: 26,LT,S$GLB, | Performed by: OPHTHALMOLOGY

## 2018-05-01 PROCEDURE — 99999 PR PBB SHADOW E&M-EST. PATIENT-LVL III: CPT | Mod: PBBFAC,,, | Performed by: FAMILY MEDICINE

## 2018-05-01 PROCEDURE — 92014 COMPRE OPH EXAM EST PT 1/>: CPT | Mod: S$GLB,,, | Performed by: OPHTHALMOLOGY

## 2018-05-01 RX ORDER — TROPICAMIDE 10 MG/ML
1 SOLUTION/ DROPS OPHTHALMIC
Status: CANCELLED | OUTPATIENT
Start: 2018-05-01

## 2018-05-01 RX ORDER — MOXIFLOXACIN 5 MG/ML
1 SOLUTION/ DROPS OPHTHALMIC
Status: CANCELLED | OUTPATIENT
Start: 2018-05-01

## 2018-05-01 RX ORDER — PHENYLEPHRINE HYDROCHLORIDE 25 MG/ML
1 SOLUTION/ DROPS OPHTHALMIC
Status: CANCELLED | OUTPATIENT
Start: 2018-05-01

## 2018-05-01 RX ORDER — TETRACAINE HYDROCHLORIDE 5 MG/ML
1 SOLUTION OPHTHALMIC
Status: CANCELLED | OUTPATIENT
Start: 2018-05-01

## 2018-05-01 NOTE — LETTER
May 1, 2018      Ashley Hooper, OD  [address]  [facility] [address]           [facility]  [address]  [facility] [address]  Phone: [phone]  Fax: [phone]          Patient: Srinivasan Munoz   MR Number: [id number]   YOB: 1960   Date of Visit: 5/1/2018       Dear Dr. Ashley Hooper:    Thank you for referring Srinivasan Munoz to me for evaluation. Attached you will find relevant portions of my assessment and plan of care.    If you have questions, please do not hesitate to call me. I look forward to following Srinivasan Munoz along with you.    Sincerely,    Funmilayo Pa MD    Enclosure  CC:  No Recipients    If you would like to receive this communication electronically, please contact [email or web address] or [phone] to request more information on [facility] Link access.    For providers and/or their staff who would like to refer a patient to [facility], please contact us through our one-stop-shop provider referral line, [facility] , at [phone].    If you feel you have received this communication in error or would no longer like to receive these types of communications, please e-mail [email or web address]

## 2018-05-01 NOTE — PROGRESS NOTES
Office Visit    Patient Name: Srinivasan Munoz    : 1960  MRN: [id number]      Assessment/Plan:  Srinivasan Munoz is a 58 y.o. male who presents today for :    Left hip pain    Low back pain radiating to left lower extremity    -pain not improved with current Norco and mm relaxer.  -pt has prior Ortho referral, I had referral coordinator speak with pt and accelerate the referral appt [facility] for further eval by Ortho, which pt has appt for tomorrow at 10:20am.  -advised pt to continue with medication scheduled for pain control, as well as addition relief measures such as deep massage/ice pack/stretching exercises/ acitvity modification.    Follow-up for any evaluation as needed.     This note was created by combination of typed  and Dragon dictation.  Transcription errors may be present.  If there are any questions, please contact me.        ----------------------------------------------------------------------------------------------------------------------      HPI:  Srinivasan is a 58 y.o. male who presents today for:    Hip Pain; Leg Pain; and Numbness      This patient is new to me     This patient has multiple medical diagnoses as noted below.      Patient is here today for f/u of Hip Pain; Leg Pain; and Numbness  that started about 2 weeks ago after he was standing on a chair and slipped, hitting his L hip against a refrigerator on the floor. He was seen in our clinic for pain and was given mm relaxers and Norco, for which he states Norco only helps for a little bit taken as twice daily, with temporary pain relief - he doesn't feel that the mm relaxer was helping. Pt continues to have 8/10 pain in the L hip, radiating to the L knee, also associated with increasing numbness in the the L thigh for the past 2 weeks. Pain is worse with sitting, better with standing. He is not able to comfortably perform daily routine.  No BMs changes or urinary changes  No  tingling/numbness/weakness.  Denies pain radiation      Pt had came back to clinic for Lumbar XR that was ordered, but was told that the machine could not accommodate his weight and thus pt left.        Additional ROS    No CP/SOB/palpitations/swelling  No cough/wheezing/SOB  No nausea/vomiting/abd pain    Patient Active Problem List   Diagnosis    Obesity (BMI 30-39.9)    Chronic pain of right heel    Chronic pain of left ankle    Degenerative cervical disc       Current Medications  Medications reviewed/updated.     Current Outpatient Prescriptions on File Prior to Visit   Medication Sig Dispense Refill    gabapentin (NEURONTIN) 600 MG tablet Take 1 tablet (600 mg total) by mouth 2 (two) times daily. 180 tablet 0    gabapentin (NEURONTIN) 800 MG tablet       hydrocodone-acetaminophen 7.5-325mg (NORCO) 7.5-325 mg per tablet Take 1 tablet by mouth every 24 hours as needed for Pain. 30 tablet 0    naproxen (NAPROSYN) 500 MG tablet Take 1 tablet (500 mg total) by mouth 2 (two) times daily. 180 tablet 0    sildenafil (REVATIO) 20 mg Tab Take 1 tablet (20 mg total) by mouth 3 (three) times daily. 90 tablet 11     No current facility-administered medications on file prior to visit.        Past Surgical History:   Procedure Laterality Date    CATARACT EXTRACTION Right     EYE SURGERY      REFRACTIVE SURGERY Bilateral 18 yrs    RETINAL DETACHMENT SURGERY Right 20 yrs ago       Family History   Problem Relation Age of Onset    Heart disease Father     Hypertension Father     Diabetes Mother     Stroke Mother     Cancer Mother     Hypertension Mother     Stroke Brother     Diabetes Brother     No Known Problems Sister     No Known Problems Maternal Aunt     No Known Problems Maternal Uncle     No Known Problems Paternal Aunt     No Known Problems Paternal Uncle     No Known Problems Maternal Grandmother     No Known Problems Maternal Grandfather     No Known Problems Paternal Grandmother     No  "Known Problems Paternal Grandfather     Amblyopia Neg Hx     Blindness Neg Hx     Cataracts Neg Hx     Glaucoma Neg Hx     Macular degeneration Neg Hx     Retinal detachment Neg Hx     Strabismus Neg Hx     Thyroid disease Neg Hx        Social History     Social History    Marital status:      Spouse name: N/A    Number of children: N/A    Years of education: N/A     Occupational History    Not on file.     Social History Main Topics    Smoking status: Current Every Day Smoker     Packs/day: 2.00     Types: Cigarettes    Smokeless tobacco: Never Used    Alcohol use No    Drug use: No    Sexual activity: Yes     Partners: Female     Other Topics Concern    Not on file     Social History Narrative    No narrative on file           Allergies   Review of patient's allergies indicates:   Allergen Reactions    Penicillins Hives             Review of Systems  See HPI      Physical Exam  BP (!) 130/92   Pulse 73   Temp 98.6 °F (37 °C) (Oral)   Ht 6' 1" (1.854 m)   Wt (!) 140.4 kg (309 lb 8.4 oz)   SpO2 (!) 94%   BMI 40.84 kg/m²     GEN: NAD, well developed, pleasant, well nourished  ABD: s/nt/nd, NABS  SKIN: normal turgor, no rashes  PSYCH: AOx3, appropriate mood and affect  MSK: warm/well perfused, normal ROM in all 4 extremities, no c/c/e.  HIP: decreased FROM of L hip, normal R hip with no gross abnormality. + TTP at hip joint - no swelling/erythema. Pt unable to completely squat due to pain. Normal spine/back exam. No crepitus  Normal flexion/extention/abduction/adduction                "

## 2018-05-01 NOTE — PROGRESS NOTES
HPI     Referred by Dr. Hooper    S/P Phaco w/IOL OD 1999 Dr. Landry  S/P RD OD 1999  S/P LASIK OU 2000  Patient states he has had a total of 11 surgeries OD    Patient states he had a Dislocated IOL OD/RD in OS. Patient states he also   has a cataract in the left eye. Patient states he has a big problems with   glare and halos especially at night.     No gtts    Last edited by Kathya Mcnamara on 5/1/2018  2:03 PM. (History)            Assessment /Plan     For exam results, see Encounter Report.    Nuclear sclerosis of left eye  -     IOL Master - MOD 26 - OS - Left eye    RD (retinal detachment), right    S/P LASIK (laser assisted in situ keratomileusis)      Visually Significant Cataract: Patient reports decreased vision consistent with the clinical amount of lenticular opacity, which reaches the level of visual significance and affects activities of daily living. Risks, benefits, and alternatives to cataract surgery were discussed and the consent reviewed. IOL options were discussed, including ATIOLs and the associated side effects and additional patient cost associated with them.   IOL Selections:   Right eye  IOL: na     Left eye  IOL: pcboo 20.5 (no lasik adj)    Pt wishes to have left eye done first.  S/p mild myopic lasik tx  Pt defers ORA and FLACS with the understanding that the likelihood for glasses after surgery is increased.

## 2018-05-16 ENCOUNTER — TELEPHONE (OUTPATIENT)
Dept: OPHTHALMOLOGY | Facility: CLINIC | Age: 58
End: 2018-05-16

## 2018-05-16 DIAGNOSIS — H25.12 NUCLEAR SCLEROTIC CATARACT OF LEFT EYE: Primary | ICD-10-CM

## 2018-06-11 ENCOUNTER — TELEPHONE (OUTPATIENT)
Dept: OPHTHALMOLOGY | Facility: CLINIC | Age: 58
End: 2018-06-11

## 2018-06-11 NOTE — TELEPHONE ENCOUNTER
I spoke to pharmacy.  They stated they had already filled the Rx for Mr Munoz.  I then called patient 0and confirmed with him that they had arranged for payment and delivery of drops.

## 2018-06-11 NOTE — TELEPHONE ENCOUNTER
----- Message from Jana Flores sent at 6/11/2018 10:54 AM CDT -----  Contact: spouse  Pt spouse calling to inform that when she calls [facility] for her husbands pre surgery drops that there line is continuously busy.  Is there another number to call?  She can be reached at [phone]

## 2018-06-14 DIAGNOSIS — Z11.59 NEED FOR HEPATITIS C SCREENING TEST: ICD-10-CM

## 2018-06-15 ENCOUNTER — OFFICE VISIT (OUTPATIENT)
Dept: FAMILY MEDICINE | Facility: CLINIC | Age: 58
End: 2018-06-15
Payer: COMMERCIAL

## 2018-06-15 VITALS
OXYGEN SATURATION: 96 % | HEART RATE: 70 BPM | DIASTOLIC BLOOD PRESSURE: 76 MMHG | TEMPERATURE: 98 F | HEIGHT: 73 IN | WEIGHT: 308.75 LBS | SYSTOLIC BLOOD PRESSURE: 124 MMHG | BODY MASS INDEX: 40.92 KG/M2

## 2018-06-15 DIAGNOSIS — Z12.11 ENCOUNTER FOR SCREENING FOR MALIGNANT NEOPLASM OF COLON: ICD-10-CM

## 2018-06-15 DIAGNOSIS — M25.572 CHRONIC PAIN OF LEFT ANKLE: ICD-10-CM

## 2018-06-15 DIAGNOSIS — G89.29 CHRONIC PAIN OF RIGHT HEEL: ICD-10-CM

## 2018-06-15 DIAGNOSIS — M79.671 CHRONIC PAIN OF RIGHT HEEL: ICD-10-CM

## 2018-06-15 DIAGNOSIS — G89.29 CHRONIC PAIN OF LEFT ANKLE: ICD-10-CM

## 2018-06-15 DIAGNOSIS — M79.602 LEFT ARM PAIN: Primary | ICD-10-CM

## 2018-06-15 PROCEDURE — 99999 PR PBB SHADOW E&M-EST. PATIENT-LVL III: CPT | Mod: PBBFAC,,, | Performed by: FAMILY MEDICINE

## 2018-06-15 PROCEDURE — 99214 OFFICE O/P EST MOD 30 MIN: CPT | Mod: S$GLB,,, | Performed by: FAMILY MEDICINE

## 2018-06-15 PROCEDURE — 3008F BODY MASS INDEX DOCD: CPT | Mod: CPTII,S$GLB,, | Performed by: FAMILY MEDICINE

## 2018-06-15 RX ORDER — TIZANIDINE 2 MG/1
2 TABLET ORAL EVERY 8 HOURS PRN
Qty: 30 TABLET | Refills: 0 | Status: SHIPPED | OUTPATIENT
Start: 2018-06-15 | End: 2018-06-25

## 2018-06-15 RX ORDER — HYDROCODONE BITARTRATE AND ACETAMINOPHEN 7.5; 325 MG/1; MG/1
1 TABLET ORAL
Qty: 30 TABLET | Refills: 0 | Status: SHIPPED | OUTPATIENT
Start: 2018-06-15 | End: 2020-03-10

## 2018-06-15 NOTE — PROGRESS NOTES
[facility]  Progress Note    SUBJECTIVE:     Chief Complaint   Patient presents with    Arm Pain       HPI   Srinivasan Munoz  is a 58 y.o. male here for c/o left arm/elbow pain. This is been going on for months, on/off. Certain positions make it worst. Rates pain as 8/10. Tried NSAIDs with minimal relief. No falls/trauma.     Review of patient's allergies indicates:   Allergen Reactions    Penicillins Hives       Past Medical History:   Diagnosis Date    Arthritis     Eye injury 20 yrs ago    nail stuck in od     Fractured bone     right foot    Retinal detachment 20 yrs ago     od      Past Surgical History:   Procedure Laterality Date    CATARACT EXTRACTION W/  INTRAOCULAR LENS IMPLANT Right 1999    Dr. Landry    EYE SURGERY      LASIK Bilateral 2000    RETINAL DETACHMENT SURGERY Right 1999    Dr. Landry     Family History   Problem Relation Age of Onset    Heart disease Father     Hypertension Father     Diabetes Mother     Stroke Mother     Cancer Mother     Hypertension Mother     Stroke Brother     Diabetes Brother     No Known Problems Sister     No Known Problems Maternal Aunt     No Known Problems Maternal Uncle     No Known Problems Paternal Aunt     No Known Problems Paternal Uncle     No Known Problems Maternal Grandmother     No Known Problems Maternal Grandfather     No Known Problems Paternal Grandmother     No Known Problems Paternal Grandfather     Amblyopia Neg Hx     Blindness Neg Hx     Cataracts Neg Hx     Glaucoma Neg Hx     Macular degeneration Neg Hx     Retinal detachment Neg Hx     Strabismus Neg Hx     Thyroid disease Neg Hx      Social History   Substance Use Topics    Smoking status: Current Every Day Smoker     Packs/day: 2.00     Types: Cigarettes    Smokeless tobacco: Never Used    Alcohol use No        Review of Systems   Constitutional: Negative for chills, fever and malaise/fatigue.   HENT: Negative.    Respiratory: Negative.   Negative for cough and shortness of breath.    Cardiovascular: Negative.  Negative for chest pain.   Gastrointestinal: Negative.  Negative for abdominal pain, nausea and vomiting.   Genitourinary: Negative.    Musculoskeletal: Positive for joint pain (left elbow). Negative for falls, myalgias and neck pain.   Neurological: Negative for weakness and headaches.   All other systems reviewed and are negative.    OBJECTIVE:     Vitals:    06/15/18 1505   BP: 124/76   Pulse: 70   Temp: 98.2 °F (36.8 °C)     Body mass index is 40.74 kg/m².    Physical Exam   Constitutional: He is oriented to person, place, and time and well-developed, well-nourished, and in no distress. No distress.   HENT:   Head: Normocephalic and atraumatic.   Eyes: Conjunctivae and EOM are normal.   Pulmonary/Chest: Effort normal. No respiratory distress.   Musculoskeletal: He exhibits tenderness (to palpation of distal triceps muscle. no obvious fractures, dislocations. ).   Neurological: He is alert and oriented to person, place, and time.   Skin: Skin is warm. He is not diaphoretic.       Old records were reviewed. Labs and/or images were independently reviewed.    ASSESSMENT     1. Left arm pain    2. Chronic pain of right heel    3. Chronic pain of left ankle    4. Encounter for screening for malignant neoplasm of colon        PLAN:     Left arm pain (likely musculoskeletal)  -     Ambulatory referral to Orthopedics  -     Start tiZANidine (ZANAFLEX) 2 MG tablet; Take 1 tablet (2 mg total) by mouth every 8 (eight) hours as needed.  Dispense: 30 tablet; Refill: 0  -     Patient counseled on good posture and stretching exercises. Continue to place ice packs on affected areas 3-4 times daily. Take medications as prescribed. Instructed patient to call MD if symptoms persist or worsen. Discussed to drink plenty of water, and to stop energy drinks.    Chronic pain of right heel  -     HYDROcodone-acetaminophen (NORCO) 7.5-325 mg per tablet; Take 1  tablet by mouth every 24 hours as needed for Pain.  Dispense: 30 tablet; Refill: 0    Chronic pain of left ankle  -     HYDROcodone-acetaminophen (NORCO) 7.5-325 mg per tablet; Take 1 tablet by mouth every 24 hours as needed for Pain.  Dispense: 30 tablet; Refill: 0    Encounter for screening for malignant neoplasm of colon  -     Case request GI: COLONOSCOPY      RTC PRYADIRA Mata MD  06/15/2018 3:24 PM

## 2018-06-18 DIAGNOSIS — G89.29 CHRONIC PAIN OF LEFT ANKLE: ICD-10-CM

## 2018-06-18 DIAGNOSIS — M79.671 CHRONIC PAIN OF RIGHT HEEL: ICD-10-CM

## 2018-06-18 DIAGNOSIS — M25.572 CHRONIC PAIN OF LEFT ANKLE: ICD-10-CM

## 2018-06-18 DIAGNOSIS — G89.29 CHRONIC PAIN OF RIGHT HEEL: ICD-10-CM

## 2018-06-18 NOTE — TELEPHONE ENCOUNTER
----- Message from Familia Baron sent at 6/15/2018  4:52 PM CDT -----  Contact: Srinivasan [phone]  Patient called to notify the staff that his pharmacy is out of the medication prescribed by Dr. Mata. Instead he would like the medication: HYDROcodone-acetaminophen (NORCO) 7.5-325 mg per tablet, sent to another pharmacy. He wants it sent to the [facility] on the [address].

## 2018-06-19 RX ORDER — HYDROCODONE BITARTRATE AND ACETAMINOPHEN 7.5; 325 MG/1; MG/1
1 TABLET ORAL
Qty: 30 TABLET | Refills: 0 | OUTPATIENT
Start: 2018-06-19

## 2018-06-19 NOTE — TELEPHONE ENCOUNTER
----- Message from Dayne Lezama sent at 6/19/2018  8:04 AM CDT -----  Contact: Self/[phone]  Patient called to notify the staff that his pharmacy is out of the medication prescribed by Dr. Mata. Instead he would like the medication: HYDROcodone-acetaminophen (NORCO) 7.5-325 mg per tablet, sent to another pharmacy. He wants it sent to the [facility] on the [address]. Thank you.

## 2018-06-20 ENCOUNTER — TELEPHONE (OUTPATIENT)
Dept: FAMILY MEDICINE | Facility: CLINIC | Age: 58
End: 2018-06-20

## 2018-06-20 ENCOUNTER — TELEPHONE (OUTPATIENT)
Dept: ADMINISTRATIVE | Facility: HOSPITAL | Age: 58
End: 2018-06-20

## 2018-06-20 NOTE — TELEPHONE ENCOUNTER
----- Message from Chico Velazquez sent at 6/19/2018  3:46 PM CDT -----  Contact: Wife  Pt's wife states to please disregard previous mesg requesting HYDROcodone-acetaminophen (NORCO) 7.5-325 mg per tablet to be sent to different pharmacy. Wife states medication will be picked up today at initial pharmacy it was sent to. Wife can be reached @ [phone].

## 2018-06-21 ENCOUNTER — TELEPHONE (OUTPATIENT)
Dept: OPHTHALMOLOGY | Facility: CLINIC | Age: 58
End: 2018-06-21

## 2018-06-21 ENCOUNTER — TELEPHONE (OUTPATIENT)
Dept: OPTOMETRY | Facility: CLINIC | Age: 58
End: 2018-06-21

## 2018-06-21 NOTE — TELEPHONE ENCOUNTER
Left a voicemail for the patient giving surgery arrival time 6:00am, also do not eat anything after 9:00pm the night before surgery. Patient may have water, Gatorade, or Powerade from 9:00pm until he/she leave their home the morning of surgery.

## 2018-06-25 ENCOUNTER — HOSPITAL ENCOUNTER (OUTPATIENT)
Facility: OTHER | Age: 58
Discharge: HOME | End: 2018-06-25
Attending: OPHTHALMOLOGY | Admitting: OPHTHALMOLOGY
Payer: COMMERCIAL

## 2018-06-25 ENCOUNTER — ANESTHESIA EVENT (OUTPATIENT)
Dept: SURGERY | Facility: OTHER | Age: 58
End: 2018-06-25
Payer: COMMERCIAL

## 2018-06-25 ENCOUNTER — SURGERY (OUTPATIENT)
Age: 58
End: 2018-06-25

## 2018-06-25 ENCOUNTER — ANESTHESIA (OUTPATIENT)
Dept: SURGERY | Facility: OTHER | Age: 58
End: 2018-06-25
Payer: COMMERCIAL

## 2018-06-25 VITALS
SYSTOLIC BLOOD PRESSURE: 133 MMHG | TEMPERATURE: 98 F | DIASTOLIC BLOOD PRESSURE: 63 MMHG | WEIGHT: 308 LBS | OXYGEN SATURATION: 95 % | BODY MASS INDEX: 40.82 KG/M2 | RESPIRATION RATE: 18 BRPM | HEIGHT: 73 IN | HEART RATE: 80 BPM

## 2018-06-25 DIAGNOSIS — H25.12 NUCLEAR SCLEROSIS OF LEFT EYE: ICD-10-CM

## 2018-06-25 PROCEDURE — 66984 XCAPSL CTRC RMVL W/O ECP: CPT | Mod: LT,,, | Performed by: OPHTHALMOLOGY

## 2018-06-25 PROCEDURE — 63600175 PHARM REV CODE 636 W HCPCS: Performed by: NURSE ANESTHETIST, CERTIFIED REGISTERED

## 2018-06-25 PROCEDURE — V2632 POST CHMBR INTRAOCULAR LENS: HCPCS | Performed by: OPHTHALMOLOGY

## 2018-06-25 PROCEDURE — 37000008 HC ANESTHESIA 1ST 15 MINUTES: Performed by: OPHTHALMOLOGY

## 2018-06-25 PROCEDURE — 25000003 PHARM REV CODE 250: Performed by: OPHTHALMOLOGY

## 2018-06-25 PROCEDURE — 37000009 HC ANESTHESIA EA ADD 15 MINS: Performed by: OPHTHALMOLOGY

## 2018-06-25 PROCEDURE — 25000003 PHARM REV CODE 250: Performed by: NURSE ANESTHETIST, CERTIFIED REGISTERED

## 2018-06-25 PROCEDURE — 36000707: Performed by: OPHTHALMOLOGY

## 2018-06-25 PROCEDURE — 36000706: Performed by: OPHTHALMOLOGY

## 2018-06-25 PROCEDURE — 71000015 HC POSTOP RECOV 1ST HR: Performed by: OPHTHALMOLOGY

## 2018-06-25 DEVICE — LENS IOL ITEC PRELOAD 20.5D: Type: IMPLANTABLE DEVICE | Site: EYE | Status: FUNCTIONAL

## 2018-06-25 RX ORDER — LIDOCAINE HYDROCHLORIDE 40 MG/ML
INJECTION, SOLUTION RETROBULBAR
Status: DISCONTINUED | OUTPATIENT
Start: 2018-06-25 | End: 2018-06-25 | Stop reason: HOSPADM

## 2018-06-25 RX ORDER — ACETAMINOPHEN 325 MG/1
650 TABLET ORAL EVERY 4 HOURS PRN
Status: DISCONTINUED | OUTPATIENT
Start: 2018-06-25 | End: 2018-06-25 | Stop reason: HOSPADM

## 2018-06-25 RX ORDER — TROPICAMIDE 10 MG/ML
1 SOLUTION/ DROPS OPHTHALMIC
Status: COMPLETED | OUTPATIENT
Start: 2018-06-25 | End: 2018-06-25

## 2018-06-25 RX ORDER — MOXIFLOXACIN 5 MG/ML
SOLUTION/ DROPS OPHTHALMIC
Status: DISCONTINUED | OUTPATIENT
Start: 2018-06-25 | End: 2018-06-25 | Stop reason: HOSPADM

## 2018-06-25 RX ORDER — MOXIFLOXACIN 5 MG/ML
1 SOLUTION/ DROPS OPHTHALMIC
Status: COMPLETED | OUTPATIENT
Start: 2018-06-25 | End: 2018-06-25

## 2018-06-25 RX ORDER — PHENYLEPHRINE HYDROCHLORIDE 25 MG/ML
1 SOLUTION/ DROPS OPHTHALMIC
Status: COMPLETED | OUTPATIENT
Start: 2018-06-25 | End: 2018-06-25

## 2018-06-25 RX ORDER — PROPARACAINE HYDROCHLORIDE 5 MG/ML
1 SOLUTION/ DROPS OPHTHALMIC
Status: DISCONTINUED | OUTPATIENT
Start: 2018-06-25 | End: 2018-06-25 | Stop reason: HOSPADM

## 2018-06-25 RX ORDER — SODIUM CHLORIDE 9 MG/ML
INJECTION, SOLUTION INTRAVENOUS CONTINUOUS PRN
Status: DISCONTINUED | OUTPATIENT
Start: 2018-06-25 | End: 2018-06-25

## 2018-06-25 RX ORDER — SODIUM CHLORIDE, SODIUM LACTATE, POTASSIUM CHLORIDE, CALCIUM CHLORIDE 600; 310; 30; 20 MG/100ML; MG/100ML; MG/100ML; MG/100ML
INJECTION, SOLUTION INTRAVENOUS CONTINUOUS PRN
Status: DISCONTINUED | OUTPATIENT
Start: 2018-06-25 | End: 2018-06-25

## 2018-06-25 RX ORDER — MIDAZOLAM HYDROCHLORIDE 1 MG/ML
INJECTION INTRAMUSCULAR; INTRAVENOUS
Status: DISCONTINUED | OUTPATIENT
Start: 2018-06-25 | End: 2018-06-25

## 2018-06-25 RX ORDER — TETRACAINE HYDROCHLORIDE 5 MG/ML
SOLUTION OPHTHALMIC
Status: DISCONTINUED | OUTPATIENT
Start: 2018-06-25 | End: 2018-06-25 | Stop reason: HOSPADM

## 2018-06-25 RX ORDER — TETRACAINE HYDROCHLORIDE 5 MG/ML
1 SOLUTION OPHTHALMIC
Status: COMPLETED | OUTPATIENT
Start: 2018-06-25 | End: 2018-06-25

## 2018-06-25 RX ADMIN — MOXIFLOXACIN HYDROCHLORIDE 1 DROP: 5 SOLUTION/ DROPS OPHTHALMIC at 07:06

## 2018-06-25 RX ADMIN — TETRACAINE HYDROCHLORIDE 2 DROP: 5 SOLUTION OPHTHALMIC at 07:06

## 2018-06-25 RX ADMIN — TROPICAMIDE 1 DROP: 10 SOLUTION/ DROPS OPHTHALMIC at 06:06

## 2018-06-25 RX ADMIN — TETRACAINE HYDROCHLORIDE 1 DROP: 5 SOLUTION OPHTHALMIC at 06:06

## 2018-06-25 RX ADMIN — SODIUM CHONDROITIN SULFATE / SODIUM HYALURONATE 2 ML: 0.55-0.5 INJECTION INTRAOCULAR at 07:06

## 2018-06-25 RX ADMIN — LIDOCAINE HYDROCHLORIDE 2 DROP: 40 INJECTION, SOLUTION RETROBULBAR; TOPICAL at 07:06

## 2018-06-25 RX ADMIN — MOXIFLOXACIN 1 DROP: 5 SOLUTION/ DROPS OPHTHALMIC at 06:06

## 2018-06-25 RX ADMIN — PHENYLEPHRINE HYDROCHLORIDE 1 DROP: 25 SOLUTION/ DROPS OPHTHALMIC at 06:06

## 2018-06-25 RX ADMIN — BALANCED SALT SOLUTION ENRICHED WITH BICARBONATE, DEXTROSE, AND GLUTATHIONE 515 ML: KIT at 07:06

## 2018-06-25 RX ADMIN — MIDAZOLAM HYDROCHLORIDE 2 MG: 1 INJECTION, SOLUTION INTRAMUSCULAR; INTRAVENOUS at 07:06

## 2018-06-25 RX ADMIN — SODIUM CHLORIDE: 9 INJECTION, SOLUTION INTRAVENOUS at 07:06

## 2018-06-25 NOTE — PLAN OF CARE
Srinivasan Munoz has met all discharge criteria from Phase II. Vital Signs are stable, ambulating  without difficulty. Discharge instructions given, patient verbalized understanding. Discharged from facility via wheelchair in stable condition.

## 2018-06-25 NOTE — OP NOTE
SURGEON:  Funmilayo Pa M.D.    PREOPERATIVE DIAGNOSIS:    Nuclear Sclerotic Cataract Left Eye    POSTOPERATIVE DIAGNOSIS:    Nuclear Sclerotic Cataract Left Eye    PROCEDURES:    Phacoemulsification with  intraocular lens, Left eye (99668)    DATE OF SURGERY: 06/25/2018    IMPLANT: pcboo 20.5    ANESTHESIA:  MAC with topical Lidocaine    COMPLICATIONS:  None    ESTIMATED BLOOD LOSS: None    SPECIMENS: None    INDICATIONS:    The patient has a history of painless progressive visual loss and  difficulty with activities of daily living secondary to cataract formation.  After a thorough discussion of the risks, benefits, and alternatives to cataract surgery, including, but not limited to, the rare risks of infection, retinal detachment, hemorrhage, need for additional surgery, loss of vision, and even loss of the eye, the patient voices understanding and desires to proceed.    DESCRIPTION OF PROCEDURE:    The patients IOL calculations were reviewed, and the lens selection confirmed.   After verification and marking of the proper eye in the preop holding area, the patient was brought to the operating room in supine position where the eye was prepped and draped in standard sterile fashion with 5% Betadine and a lid speculum placed in the eye.   Topical 4% Lidocaine was used in addition to the preoperative anesthesia and the procedure was begun by the creation of a paracentesis incision through which viscoelastic was used to fill the anterior chamber.  Next, a keratome blade was used to create a triplanar temporal clear corneal incision and a cystotome and Utrata forceps used to fashion a continuous curvilinear capsulorrhexis.  Hydrodissection was carried out using the Hassan hydrodissection cannula and the nucleus was found to be mobile.  Phacoemulsification of the nucleus was carried out using a quick chop technique, and all remaining epinuclear and cortical material was removed.  The eye was then reformed with  Viscoelastic and the  intraocular lens was implanted into the capsular bag.  All remaining viscoelastics were removed from the eye and at the end of the case the pupil was round, the lens was well-centered within the capsular bag and all wounds were found to be water tight.  Drops of Vigamox and Pred Forte were instilled and a shield was placed over the eye. The patient will follow up with Dr. Pa in the morning.

## 2018-06-25 NOTE — ANESTHESIA PREPROCEDURE EVALUATION
06/25/2018  Srinivasan Munoz is a 58 y.o., male.    Anesthesia Evaluation    I have reviewed the Patient Summary Reports.    I have reviewed the Nursing Notes.   I have reviewed the Medications.     Review of Systems  Anesthesia Hx:  No problems with previous Anesthesia    Social:  Smoker    Cardiovascular:   Exercise tolerance: good    Pulmonary:  Pulmonary Normal    Hepatic/GI:  Hepatic/GI Normal    Musculoskeletal:   Arthritis     Endocrine:  Endocrine Normal        Physical Exam  General:  Obesity    Airway/Jaw/Neck:  Airway Findings: Mouth Opening: Normal Tongue: Normal  General Airway Assessment: Adult  Mallampati: II  TM Distance: Normal, at least 6 cm  Jaw/Neck Findings:     Neck ROM: Normal ROM      Dental:  Dental Findings: In tact             Anesthesia Plan  Type of Anesthesia, risks & benefits discussed:  Anesthesia Type:  MAC  Patient's Preference:   Intra-op Monitoring Plan:   Intra-op Monitoring Plan Comments:   Post Op Pain Control Plan:   Post Op Pain Control Plan Comments:   Induction:   IV  Beta Blocker:         Informed Consent: Patient understands risks and agrees with Anesthesia plan.  Questions answered. Anesthesia consent signed with patient.  ASA Score: 3     Day of Surgery Review of History & Physical:    H&P update referred to the surgeon.         Ready For Surgery From Anesthesia Perspective.

## 2018-06-25 NOTE — DISCHARGE SUMMARY
Outcome: Successful outpatient ophthalmic surgical procedure  Preprinted Instructions given to patient.  Regular diet.  Activity: No restrictions  Meds: see Med Rec  Condition: stable  Follow up: 1 day with Dr Pa  Disposition: Home  Diagnosis: s/p eye surgery

## 2018-06-26 ENCOUNTER — TELEPHONE (OUTPATIENT)
Dept: OPHTHALMOLOGY | Facility: CLINIC | Age: 58
End: 2018-06-26

## 2018-06-26 ENCOUNTER — OFFICE VISIT (OUTPATIENT)
Dept: OPHTHALMOLOGY | Facility: CLINIC | Age: 58
End: 2018-06-26
Attending: OPHTHALMOLOGY
Payer: COMMERCIAL

## 2018-06-26 DIAGNOSIS — Z98.890 POST-OPERATIVE STATE: Primary | ICD-10-CM

## 2018-06-26 PROCEDURE — 99999 PR PBB SHADOW E&M-EST. PATIENT-LVL II: CPT | Mod: PBBFAC,,, | Performed by: OPHTHALMOLOGY

## 2018-06-26 PROCEDURE — 99024 POSTOP FOLLOW-UP VISIT: CPT | Mod: S$GLB,,, | Performed by: OPHTHALMOLOGY

## 2018-06-26 NOTE — TELEPHONE ENCOUNTER
Spoke with patient and told him it was ok for him to return to work tomorrow if he felt comfortable but to make sure he follows the restrictions. SDF

## 2018-06-26 NOTE — PROGRESS NOTES
HPI     1 day post op CE with IOL OS (6/25/18).  Pt states that vision is doing well in OS. No pain or discomfort. No   flashes or floaters OU.   Pt confirms taking PGB TID OS.    Last edited by Valarie Frye on 6/26/2018  8:02 AM. (History)            Assessment /Plan     For exam results, see Encounter Report.    Post-operative state      Slit lamp exam:  L/L: nl  K: clear, wound sealed  AC: 1+ cell  Lens: IOL centered and stable    POD1 s/p Phaco/IOL  Appropriate precautions and post op medications reviewed.  Patient instructed to call or come in if symptoms of redness, decreased vision, or pain are experienced.

## 2018-06-26 NOTE — TELEPHONE ENCOUNTER
----- Message from Jana Flores sent at 6/26/2018  8:17 AM CDT -----  Contact: Mrs. Munoz  Pt forgot to ask if he can return to work on tomorrow when he came in this morning.  Please contact him at [phone]

## 2018-07-10 ENCOUNTER — OFFICE VISIT (OUTPATIENT)
Dept: OPHTHALMOLOGY | Facility: CLINIC | Age: 58
End: 2018-07-10
Attending: OPHTHALMOLOGY
Payer: COMMERCIAL

## 2018-07-10 DIAGNOSIS — H25.12 NUCLEAR SCLEROSIS OF LEFT EYE: ICD-10-CM

## 2018-07-10 DIAGNOSIS — Z98.890 POST-OPERATIVE STATE: Primary | ICD-10-CM

## 2018-07-10 PROCEDURE — 99999 PR PBB SHADOW E&M-EST. PATIENT-LVL II: CPT | Mod: PBBFAC,,, | Performed by: OPHTHALMOLOGY

## 2018-07-10 PROCEDURE — 99024 POSTOP FOLLOW-UP VISIT: CPT | Mod: S$GLB,,, | Performed by: OPHTHALMOLOGY

## 2018-07-10 NOTE — PROGRESS NOTES
HPI     2 wk PO  Phaco w/IOL OS (6/25/18).      Pt states that vision is doing fine. No pain or discomfort.       Pt confirms taking Pred/Gati/Brom -  TID - OS.    Last edited by Nancy Sousa MA on 7/10/2018  9:51 AM. (History)            Assessment /Plan     For exam results, see Encounter Report.    Post-operative state    Nuclear sclerosis of left eye      Slit lamp exam:  L/L: nl  K: clear, wound sealed  AC: trace cell  Iris/Lens: IOL centered and stable    POW1 s/p phaco: Surgery healing well with no signs of infection or abnormal inflammation.

## 2018-08-10 ENCOUNTER — OFFICE VISIT (OUTPATIENT)
Dept: OPTOMETRY | Facility: CLINIC | Age: 58
End: 2018-08-10
Payer: COMMERCIAL

## 2018-08-10 DIAGNOSIS — Z98.42 STATUS POST LEFT CATARACT EXTRACTION: Primary | ICD-10-CM

## 2018-08-10 PROCEDURE — 99999 PR PBB SHADOW E&M-EST. PATIENT-LVL II: CPT | Mod: PBBFAC,,, | Performed by: OPTOMETRIST

## 2018-08-10 PROCEDURE — 99024 POSTOP FOLLOW-UP VISIT: CPT | Mod: S$GLB,,, | Performed by: OPTOMETRIST

## 2018-08-10 NOTE — PROGRESS NOTES
HPI     Phaco w/IOL OS (6/25/18)  S/P Phaco w/IOL OD 1999 Dr. Landry    Pt states overall vision pretty good in OS but OD vision effects/throws   off vision a little. Denies pain.      Finished Pred/Gati/Brom -  TID - OS on July 25th     Last edited by Hetal Hoskins on 8/10/2018 11:01 AM. (History)            Assessment /Plan     For exam results, see Encounter Report.    Status post left cataract extraction            1.  Pt doing well.  Bifocal rx given.  Ok to continue with OTC readers.  Eye health normal.  Return care to Dr. Hooper.

## 2018-09-10 ENCOUNTER — OFFICE VISIT (OUTPATIENT)
Dept: FAMILY MEDICINE | Facility: CLINIC | Age: 58
End: 2018-09-10
Payer: COMMERCIAL

## 2018-09-10 ENCOUNTER — TELEPHONE (OUTPATIENT)
Dept: FAMILY MEDICINE | Facility: CLINIC | Age: 58
End: 2018-09-10

## 2018-09-10 VITALS
OXYGEN SATURATION: 95 % | DIASTOLIC BLOOD PRESSURE: 80 MMHG | SYSTOLIC BLOOD PRESSURE: 120 MMHG | BODY MASS INDEX: 40.84 KG/M2 | HEIGHT: 73 IN | TEMPERATURE: 98 F | WEIGHT: 308.19 LBS | HEART RATE: 71 BPM

## 2018-09-10 DIAGNOSIS — E66.9 OBESITY (BMI 30-39.9): Chronic | ICD-10-CM

## 2018-09-10 DIAGNOSIS — B96.89 BACTERIAL SINUSITIS: ICD-10-CM

## 2018-09-10 DIAGNOSIS — J32.9 BACTERIAL SINUSITIS: ICD-10-CM

## 2018-09-10 DIAGNOSIS — Z00.00 GENERAL MEDICAL EXAM: ICD-10-CM

## 2018-09-10 DIAGNOSIS — M50.30 DEGENERATIVE CERVICAL DISC: Primary | ICD-10-CM

## 2018-09-10 PROCEDURE — 3008F BODY MASS INDEX DOCD: CPT | Mod: CPTII,S$GLB,, | Performed by: FAMILY MEDICINE

## 2018-09-10 PROCEDURE — 99214 OFFICE O/P EST MOD 30 MIN: CPT | Mod: S$GLB,,, | Performed by: FAMILY MEDICINE

## 2018-09-10 PROCEDURE — 99999 PR PBB SHADOW E&M-EST. PATIENT-LVL IV: CPT | Mod: PBBFAC,,, | Performed by: FAMILY MEDICINE

## 2018-09-10 RX ORDER — CODEINE PHOSPHATE AND GUAIFENESIN 10; 100 MG/5ML; MG/5ML
5 SOLUTION ORAL EVERY 6 HOURS PRN
Qty: 150 ML | Refills: 0 | Status: SHIPPED | OUTPATIENT
Start: 2018-09-10 | End: 2018-09-20

## 2018-09-10 RX ORDER — MELOXICAM 15 MG/1
15 TABLET ORAL DAILY
Qty: 90 TABLET | Refills: 1 | Status: SHIPPED | OUTPATIENT
Start: 2018-09-10 | End: 2019-03-26 | Stop reason: SDUPTHER

## 2018-09-10 RX ORDER — GABAPENTIN 800 MG/1
800 TABLET ORAL 2 TIMES DAILY
Qty: 180 TABLET | Refills: 0 | Status: SHIPPED | OUTPATIENT
Start: 2018-09-10 | End: 2019-01-07 | Stop reason: SDUPTHER

## 2018-09-10 RX ORDER — SULFAMETHOXAZOLE AND TRIMETHOPRIM 800; 160 MG/1; MG/1
1 TABLET ORAL 2 TIMES DAILY
Qty: 20 TABLET | Refills: 0 | Status: SHIPPED | OUTPATIENT
Start: 2018-09-10 | End: 2018-09-20

## 2018-09-10 NOTE — PROGRESS NOTES
Routine Office Visit    Patient Name: Srinivasan Munoz    : 1960  MRN: [id number]    Subjective:  Srinivasan is a 58 y.o. male who presents today for:     1.   Chief Complaint   Patient presents with    Sinus Problem     cough    Neck Pain     left neck area     Shoulder Pain     left shoulder       Started 1 week ago. Symptoms have been gradually worsening since that time. The cough is productive of yellow sputum and is aggravated by reclining position. Associated symptoms include: postnasal drip. Patient does not have a history of asthma. Patient does have a history of smoking. Patient does not have a history of environmental allergens. Patient has not traveled recently.   Patient has not had the flu vaccine. Patient has tried rubi seltzer plus and mucinex with some relief of symptoms. Sick contacts include: none.     2. Neck and shoulder pain - chronic condition for patient. Pt is following up with ortho. Pt c/o severe pain. He states he was advised he may need a shoulder replacement. He is currently seeing Dr. Barnett for injections into his spine, which he had yesterday. He states his spine had little space inbetween that Dr. Barnett was only able to inject 25% of the medication. He is waiting 2 weeks to see if pain improves with the injection prior to proceeding to a more invasive procedure.     Review of Systems   Constitutional: Negative for chills and fever.   HENT: Negative for congestion.    Eyes: Negative for blurred vision.   Respiratory: Negative for cough.    Cardiovascular: Negative for chest pain.   Gastrointestinal: Negative for abdominal pain, constipation, diarrhea, heartburn, nausea and vomiting.   Genitourinary: Negative for dysuria.   Musculoskeletal: Negative for myalgias.   Skin: Negative for itching and rash.   Neurological: Negative for dizziness and headaches.   Psychiatric/Behavioral: Negative for depression.        Active Problem List  Patient Active Problem List   Diagnosis     Obesity (BMI 30-39.9)    Chronic pain of right heel    Chronic pain of left ankle    Degenerative cervical disc    Nuclear sclerosis of left eye       Past Medical History  Past Medical History:   Diagnosis Date    Arthritis     Eye injury 20 yrs ago    nail stuck in od     Fractured bone     right foot    Nuclear sclerosis of left eye 6/25/2018    Retinal detachment 20 yrs ago     od        Past Surgical History  Past Surgical History:   Procedure Laterality Date    CATARACT EXTRACTION Left 06/25/2018    Dr. Pa    CATARACT EXTRACTION W/  INTRAOCULAR LENS IMPLANT Right 1999    Dr. Landry    EYE SURGERY      INSERTION-INTRAOCULAR LENS (IOL) Left 6/25/2018    Performed by Funmilayo Pa MD at [facility] OR    INTRAOCULAR PROSTHESES INSERTION Left 6/25/2018    Procedure: INSERTION-INTRAOCULAR LENS (IOL);  Surgeon: Funmilayo Pa MD;  Location: [facility] OR;  Service: Ophthalmology;  Laterality: Left;    LASIK Bilateral 2000    PHACOEMULSIFICATION OF CATARACT Left 6/25/2018    Procedure: PHACOEMULSIFICATION-ASPIRATION-CATARACT;  Surgeon: Funmilayo Pa MD;  Location: [facility] OR;  Service: Ophthalmology;  Laterality: Left;    PHACOEMULSIFICATION-ASPIRATION-CATARACT Left 6/25/2018    Performed by [facility]o Pa MD at [facility] OR    RETINAL DETACHMENT SURGERY Right 1999    Dr. Landry       Family History  Family History   Problem Relation Age of Onset    Heart disease Father     Hypertension Father     Diabetes Mother     Stroke Mother     Cancer Mother     Hypertension Mother     Stroke Brother     Diabetes Brother     No Known Problems Sister     No Known Problems Maternal Aunt     No Known Problems Maternal Uncle     No Known Problems Paternal Aunt     No Known Problems Paternal Uncle     No Known Problems Maternal Grandmother     No Known Problems Maternal Grandfather     No Known Problems Paternal Grandmother     No Known Problems Paternal Grandfather     Amblyopia Neg Hx     Blindness Neg Hx      "Cataracts Neg Hx     Glaucoma Neg Hx     Macular degeneration Neg Hx     Retinal detachment Neg Hx     Strabismus Neg Hx     Thyroid disease Neg Hx        Social History  Social History     Socioeconomic History    Marital status:      Spouse name: Not on file    Number of children: Not on file    Years of education: Not on file    Highest education level: Not on file   Social Needs    Financial resource strain: Not on file    Food insecurity - worry: Not on file    Food insecurity - inability: Not on file    Transportation needs - medical: Not on file    Transportation needs - non-medical: Not on file   Occupational History    Not on file   Tobacco Use    Smoking status: Current Every Day Smoker     Packs/day: 1.50     Years: 42.00     Pack years: 63.00     Types: Cigarettes    Smokeless tobacco: Never Used   Substance and Sexual Activity    Alcohol use: No    Drug use: No    Sexual activity: Yes     Partners: Female   Other Topics Concern    Not on file   Social History Narrative    Not on file       Medications and Allergies  Reviewed and updated.       Physical Exam  /80 (BP Location: Right arm, Patient Position: Sitting, BP Method: Large (Manual))   Pulse 71   Temp 98.1 °F (36.7 °C) (Oral)   Ht 6' 1" (1.854 m)   Wt (!) 139.8 kg (308 lb 3.3 oz)   SpO2 95%   BMI 40.66 kg/m²   Physical Exam   Constitutional: He is oriented to person, place, and time. He appears well-developed and well-nourished.   HENT:   Head: Normocephalic and atraumatic.   Eyes: Conjunctivae and EOM are normal. Pupils are equal, round, and reactive to light.   Neck: Normal range of motion. Neck supple. No JVD present. No thyromegaly present.   Cardiovascular: Normal rate, regular rhythm and normal heart sounds.   Pulmonary/Chest: Effort normal and breath sounds normal. He has no wheezes.   Abdominal: Soft. Bowel sounds are normal. He exhibits no distension. There is no tenderness. There is no guarding. "   Musculoskeletal: Normal range of motion.   Lymphadenopathy:     He has no cervical adenopathy.   Neurological: He is alert and oriented to person, place, and time.   Skin: Skin is warm and dry.   Psychiatric: He has a normal mood and affect. His behavior is normal.       Assessment/Plan:  Srinivasan Munoz is a 58 y.o. male who presents today for :    Degenerative cervical disc  -     gabapentin (NEURONTIN) 800 MG tablet; Take 1 tablet (800 mg total) by mouth 2 (two) times daily.  Dispense: 180 tablet; Refill: 0  -     meloxicam (MOBIC) 15 MG tablet; Take 1 tablet (15 mg total) by mouth once daily. Do not take with naprosyn / other nsaids  Dispense: 90 tablet; Refill: 1  Increase gabapentin from 600mg to 800mg   Common side effects of this medication were discussed with the patient. Questions regarding medications were discussed during this visit.   Continue f/u with ortho - Dr. Eisenberg and Dr. Barnett     General medical exam  -     CBC auto differential; Future; Expected date: 09/10/2018  -     Comprehensive metabolic panel; Future; Expected date: 09/10/2018  -     Lipid panel; Future; Expected date: 09/10/2018  -     Hemoglobin A1c; Future; Expected date: 09/10/2018  -     TSH; Future; Expected date: 09/10/2018  Schedule physical     Obesity (BMI 30-39.9)  Noted in chart    Bacterial sinusitis  -     sulfamethoxazole-trimethoprim 800-160mg (BACTRIM DS) 800-160 mg Tab; Take 1 tablet by mouth 2 (two) times daily. for 10 days  Dispense: 20 tablet; Refill: 0  -     guaifenesin-codeine 100-10 mg/5 ml (TUSSI-ORGANIDIN NR)  mg/5 mL syrup; Take 5 mLs by mouth every 6 (six) hours as needed.  Dispense: 150 mL; Refill: 0  Common side effects of this medication were discussed with the patient. Questions regarding medications were discussed during this visit.   Recommend to drink plenty of fluids  Continue otc antihistamine as needed      Follow-up for yearly exam.

## 2018-09-10 NOTE — TELEPHONE ENCOUNTER
----- Message from Raisa Webber sent at 9/10/2018  2:15 PM CDT -----  Contact: [facility]  Pharmacy calling to get permission to change script of guaifenesin-codeine 100-10 mg/5 ml (TUSSI-ORGANIDIN NR). Please call [phone]

## 2018-09-10 NOTE — PATIENT INSTRUCTIONS
Nonsurgical Treatment of Cervical Spine Problems  Cervical spine problems can often be treated without surgery. Choices include rest, medicines, or injections. Your healthcare provider may also suggest physical therapy or certain exercises. These may all help to relieve your symptoms. These treatments are often successful. But if your symptoms dont subside, talk to your healthcare provider. He or she may tell you that surgery is your best choice.  Relieving your symptoms  Your healthcare provider may recommend:  · Medicines. These help to reduce pain and inflammation.  · Epidural steroid injections. These are injections into the spinal canal near the spinal cord. They may relieve severe pain and reduce inflammation.  · Restricting aggravating activities. This can help to give your cervical spine time to heal.  · A soft cervical collar. This can help to support your head while keeping your cervical spine aligned.  · Traction. This may help relieve pressure on the irritated nerves.  Restoring mobility and strength  Your healthcare provider may recommend that you work with a physical therapist. This can help you regain mobility and strength in your neck. Physical therapy may last for 4 to 8 weeks. It may include:  · Exercises to improve your necks range of motion and strength.  · Evaluation and correction of posture and body movements. This can correct problems that affect your cervical spine.  · Heat, massage, and traction to help relieve your symptoms.  Self-care  Youll take an active role in your own therapy. To protect your neck from further injury:  · Follow any exercise program given to you by your healthcare provider or physical therapist.  · Practice good posture while sitting, standing, or moving.  · Have your workspace evaluated. Rearrange it if needed.  · When lying down, support your neck. You can use a special cervical pillow or rolled-up towel.   Date Last Reviewed: 10/5/2015  © 9954-3141 [employer]. [address] [place] [address], [place] [address]. All rights reserved. This information is not intended as a substitute for professional medical care. Always follow your healthcare professional's instructions.

## 2018-09-11 NOTE — TELEPHONE ENCOUNTER
----- Message from iNkia Nation sent at 9/11/2018 10:48 AM CDT -----  Contact: [facility]  Pharm wanting to know if doctor wants them to dispense med cause he already has a 90 day supply of the [id number].  wrote for gabapentin (NEURONTIN) 800 MG tablet on yesterday. Which one does she want the pt to take? Please contact pharm    [facility] [address] [place] [address] ([facility], [address] [phone] (Phone)  [phone] (Fax

## 2018-09-12 ENCOUNTER — TELEPHONE (OUTPATIENT)
Dept: FAMILY MEDICINE | Facility: CLINIC | Age: 58
End: 2018-09-12

## 2018-09-12 NOTE — TELEPHONE ENCOUNTER
----- Message from Arielle parkerurvashi sent at 9/12/2018  4:03 PM CDT -----  Contact: self / [phone]  Pt states Pharmacy is giving him a hard time picking up new increased Gabapentin medication. He states pharmacy advised him to call in to ask Dr. Marques to call them regarding.

## 2018-09-12 NOTE — TELEPHONE ENCOUNTER
Pharmacy states patient has  received 180 tabs of 600mg of gabapentin from another provider, should patient be receiving the 800mg as well. Please advise

## 2018-09-13 ENCOUNTER — LAB VISIT (OUTPATIENT)
Dept: LAB | Facility: HOSPITAL | Age: 58
End: 2018-09-13
Attending: FAMILY MEDICINE
Payer: COMMERCIAL

## 2018-09-13 DIAGNOSIS — Z00.00 GENERAL MEDICAL EXAM: ICD-10-CM

## 2018-09-13 DIAGNOSIS — Z11.59 NEED FOR HEPATITIS C SCREENING TEST: ICD-10-CM

## 2018-09-13 LAB
ALBUMIN SERPL BCP-MCNC: 3.6 G/DL
ALP SERPL-CCNC: 78 U/L
ALT SERPL W/O P-5'-P-CCNC: 43 U/L
ANION GAP SERPL CALC-SCNC: 11 MMOL/L
AST SERPL-CCNC: 25 U/L
BASOPHILS # BLD AUTO: 0.15 K/UL
BASOPHILS NFR BLD: 1.3 %
BILIRUB SERPL-MCNC: 0.7 MG/DL
BUN SERPL-MCNC: 15 MG/DL
CALCIUM SERPL-MCNC: 9.7 MG/DL
CHLORIDE SERPL-SCNC: 107 MMOL/L
CHOLEST SERPL-MCNC: 164 MG/DL
CHOLEST/HDLC SERPL: 4 {RATIO}
CO2 SERPL-SCNC: 23 MMOL/L
CREAT SERPL-MCNC: 1 MG/DL
DIFFERENTIAL METHOD: ABNORMAL
EOSINOPHIL # BLD AUTO: 0.1 K/UL
EOSINOPHIL NFR BLD: 1.2 %
ERYTHROCYTE [DISTWIDTH] IN BLOOD BY AUTOMATED COUNT: 13.2 %
EST. GFR  (AFRICAN AMERICAN): >60 ML/MIN/1.73 M^2
EST. GFR  (NON AFRICAN AMERICAN): >60 ML/MIN/1.73 M^2
ESTIMATED AVG GLUCOSE: 111 MG/DL
GLUCOSE SERPL-MCNC: 107 MG/DL
HBA1C MFR BLD HPLC: 5.5 %
HCT VFR BLD AUTO: 45.7 %
HCV AB SERPL QL IA: NEGATIVE
HDLC SERPL-MCNC: 41 MG/DL
HDLC SERPL: 25 %
HGB BLD-MCNC: 14.3 G/DL
IMM GRANULOCYTES # BLD AUTO: 0.08 K/UL
IMM GRANULOCYTES NFR BLD AUTO: 0.7 %
LDLC SERPL CALC-MCNC: 103.2 MG/DL
LYMPHOCYTES # BLD AUTO: 2.6 K/UL
LYMPHOCYTES NFR BLD: 22.4 %
MCH RBC QN AUTO: 28.5 PG
MCHC RBC AUTO-ENTMCNC: 31.3 G/DL
MCV RBC AUTO: 91 FL
MONOCYTES # BLD AUTO: 1.1 K/UL
MONOCYTES NFR BLD: 9.6 %
NEUTROPHILS # BLD AUTO: 7.7 K/UL
NEUTROPHILS NFR BLD: 64.8 %
NONHDLC SERPL-MCNC: 123 MG/DL
NRBC BLD-RTO: 0 /100 WBC
PLATELET # BLD AUTO: 416 K/UL
PMV BLD AUTO: 9.7 FL
POTASSIUM SERPL-SCNC: 3.9 MMOL/L
PROT SERPL-MCNC: 7 G/DL
RBC # BLD AUTO: 5.01 M/UL
SODIUM SERPL-SCNC: 141 MMOL/L
TRIGL SERPL-MCNC: 99 MG/DL
TSH SERPL DL<=0.005 MIU/L-ACNC: 2.5 UIU/ML
WBC # BLD AUTO: 11.79 K/UL

## 2018-09-13 PROCEDURE — 84443 ASSAY THYROID STIM HORMONE: CPT

## 2018-09-13 PROCEDURE — 36415 COLL VENOUS BLD VENIPUNCTURE: CPT | Mod: PO

## 2018-09-13 PROCEDURE — 86803 HEPATITIS C AB TEST: CPT

## 2018-09-13 PROCEDURE — 83036 HEMOGLOBIN GLYCOSYLATED A1C: CPT

## 2018-09-13 PROCEDURE — 80053 COMPREHEN METABOLIC PANEL: CPT

## 2018-09-13 PROCEDURE — 85025 COMPLETE CBC W/AUTO DIFF WBC: CPT

## 2018-09-13 PROCEDURE — 80061 LIPID PANEL: CPT

## 2018-09-17 ENCOUNTER — TELEPHONE (OUTPATIENT)
Dept: FAMILY MEDICINE | Facility: CLINIC | Age: 58
End: 2018-09-17

## 2018-09-17 NOTE — TELEPHONE ENCOUNTER
"----- Message from Donta Nielson sent at 9/17/2018  8:18 AM CDT -----  Contact: Tata pitts/[phone]  The patient wife stating when the patient cough he's getting "shocked." She also stating it happen even when he don't cough at times.    Thank you  "

## 2019-01-07 DIAGNOSIS — M50.30 DEGENERATIVE CERVICAL DISC: ICD-10-CM

## 2019-01-07 RX ORDER — GABAPENTIN 800 MG/1
800 TABLET ORAL 2 TIMES DAILY
Qty: 180 TABLET | Refills: 0 | Status: SHIPPED | OUTPATIENT
Start: 2019-01-07 | End: 2019-03-26 | Stop reason: SDUPTHER

## 2019-01-07 NOTE — TELEPHONE ENCOUNTER
----- Message from Shantell Cornell sent at 1/7/2019  9:26 AM CST -----  Contact: self [phone]  Requesting a refill on gabapentin (NEURONTIN) 800 MG tablet  .  [facility] - [place] ([place], [place] - [address]  [address] ([place] [address]  Phone: [phone] Fax: [phone]

## 2019-03-26 ENCOUNTER — OFFICE VISIT (OUTPATIENT)
Dept: FAMILY MEDICINE | Facility: CLINIC | Age: 59
End: 2019-03-26
Payer: COMMERCIAL

## 2019-03-26 VITALS
DIASTOLIC BLOOD PRESSURE: 76 MMHG | BODY MASS INDEX: 40.44 KG/M2 | HEIGHT: 73 IN | OXYGEN SATURATION: 96 % | SYSTOLIC BLOOD PRESSURE: 140 MMHG | WEIGHT: 305.13 LBS | TEMPERATURE: 98 F | HEART RATE: 70 BPM

## 2019-03-26 DIAGNOSIS — R20.0 NUMBNESS AND TINGLING IN LEFT ARM: Primary | ICD-10-CM

## 2019-03-26 DIAGNOSIS — G89.29 CHRONIC PAIN OF RIGHT HEEL: Chronic | ICD-10-CM

## 2019-03-26 DIAGNOSIS — E66.9 OBESITY (BMI 30-39.9): Chronic | ICD-10-CM

## 2019-03-26 DIAGNOSIS — M25.512 CHRONIC PAIN IN LEFT SHOULDER: ICD-10-CM

## 2019-03-26 DIAGNOSIS — R20.2 NUMBNESS AND TINGLING IN LEFT ARM: Primary | ICD-10-CM

## 2019-03-26 DIAGNOSIS — M50.30 DEGENERATIVE CERVICAL DISC: ICD-10-CM

## 2019-03-26 DIAGNOSIS — M79.671 CHRONIC PAIN OF RIGHT HEEL: Chronic | ICD-10-CM

## 2019-03-26 DIAGNOSIS — G89.29 CHRONIC PAIN IN LEFT SHOULDER: ICD-10-CM

## 2019-03-26 PROCEDURE — 3008F BODY MASS INDEX DOCD: CPT | Mod: CPTII,S$GLB,, | Performed by: PHYSICIAN ASSISTANT

## 2019-03-26 PROCEDURE — 99215 PR OFFICE/OUTPT VISIT, EST, LEVL V, 40-54 MIN: ICD-10-PCS | Mod: S$GLB,,, | Performed by: PHYSICIAN ASSISTANT

## 2019-03-26 PROCEDURE — 99215 OFFICE O/P EST HI 40 MIN: CPT | Mod: S$GLB,,, | Performed by: PHYSICIAN ASSISTANT

## 2019-03-26 PROCEDURE — 93010 EKG 12-LEAD: ICD-10-PCS | Mod: S$GLB,,, | Performed by: INTERNAL MEDICINE

## 2019-03-26 PROCEDURE — 3008F PR BODY MASS INDEX (BMI) DOCUMENTED: ICD-10-PCS | Mod: CPTII,S$GLB,, | Performed by: PHYSICIAN ASSISTANT

## 2019-03-26 PROCEDURE — 93005 EKG 12-LEAD: ICD-10-PCS | Mod: S$GLB,,, | Performed by: PHYSICIAN ASSISTANT

## 2019-03-26 PROCEDURE — 99999 PR PBB SHADOW E&M-EST. PATIENT-LVL IV: ICD-10-PCS | Mod: PBBFAC,,, | Performed by: PHYSICIAN ASSISTANT

## 2019-03-26 PROCEDURE — 99999 PR PBB SHADOW E&M-EST. PATIENT-LVL IV: CPT | Mod: PBBFAC,,, | Performed by: PHYSICIAN ASSISTANT

## 2019-03-26 PROCEDURE — 93010 ELECTROCARDIOGRAM REPORT: CPT | Mod: S$GLB,,, | Performed by: INTERNAL MEDICINE

## 2019-03-26 PROCEDURE — 93005 ELECTROCARDIOGRAM TRACING: CPT | Mod: S$GLB,,, | Performed by: PHYSICIAN ASSISTANT

## 2019-03-26 RX ORDER — MELOXICAM 15 MG/1
15 TABLET ORAL DAILY
Qty: 90 TABLET | Refills: 1 | Status: SHIPPED | OUTPATIENT
Start: 2019-03-26 | End: 2020-01-23

## 2019-03-26 RX ORDER — GABAPENTIN 800 MG/1
800 TABLET ORAL 2 TIMES DAILY
Qty: 180 TABLET | Refills: 1 | Status: SHIPPED | OUTPATIENT
Start: 2019-03-26 | End: 2020-06-19 | Stop reason: SDUPTHER

## 2019-03-26 NOTE — ASSESSMENT & PLAN NOTE
Previously followed by ortho, surgery recommended, patient declined surgery at the time, reports his pain and swelling to be more frequent and uncontrolled. Will refer to ortho and PT.

## 2019-03-26 NOTE — PROGRESS NOTES
"Patient Name: Srinivasan Munoz    : 1960  MRN: [id number]    Subjective:  Wan is a 59 y.o. male who presents today for:    Chief Complaint   Patient presents with    Ankle Pain     right    Shoulder Pain     left times 2 years    Spasms     both hands and both arms       HPI  Patient has multiple medical diagnoses as listed below in the history. He complains of left shoulder pain, right ankle pain and neck pain. He reports all of these issues to be chronic. He has been followed by ortho and was told that his shoulder and right ankle need surgical repair. He has been managing the pain conservatively with NSAID therapy. He reports the pain to be uncontrolled and worsening. He states it is affecting his work and personal life. The patient is here with his wife who states "he is not himself anymore, he is in so much pain all the time that he has become different." The patient has a history of degenerative cervical disc disease. He has received injections in the past from ortho. He says the injections gave him some relief. He has not been referred to neurosurgery for his cervical radiculopathy. Denies recent injury or trauma.    He also complains of numbness/tingling feeling of the left arm with new dyspnea on exertion. He and his wife are concerned of possible cardiac issues. He reports having bilateral hand spasms at baseline due to the cervical pathology, however the numbness and tingling are a new symptom over last few months. She reports the patient to have been short of breath more so in the last few months when doing yard work around their home. He is a current smoker 1.5 ppd. No dyspnea at rest. He denies orthopnea, leg swelling, or chest pain.    Past Medical History  Past Medical History:   Diagnosis Date    Arthritis     Eye injury 20 yrs ago    nail stuck in od     Fractured bone     right foot    Nuclear sclerosis of left eye 2018    Retinal detachment 20 yrs ago     od        Past " Surgical History  Past Surgical History:   Procedure Laterality Date    CATARACT EXTRACTION Left 06/25/2018    Dr. Pa    CATARACT EXTRACTION W/  INTRAOCULAR LENS IMPLANT Right 1999    Dr. Landry    EYE SURGERY      INSERTION-INTRAOCULAR LENS (IOL) Left 6/25/2018    [facility]    LASIK Bilateral 2000    PHACOEMULSIFICATION-ASPIRATION-CATARACT Left 6/25/2018    [facility]    RETINAL DETACHMENT SURGERY Right 1999    Dr. Landry       Family History  Family History   Problem Relation Age of Onset    Heart disease Father     Hypertension Father     Diabetes Mother     Stroke Mother     Cancer Mother     Hypertension Mother     Stroke Brother     Diabetes Brother     No Known Problems Sister     No Known Problems Maternal Aunt     No Known Problems Maternal Uncle     No Known Problems Paternal Aunt     No Known Problems Paternal Uncle     No Known Problems Maternal Grandmother     No Known Problems Maternal Grandfather     No Known Problems Paternal Grandmother     No Known Problems Paternal Grandfather     Amblyopia Neg Hx     Blindness Neg Hx     Cataracts Neg Hx     Glaucoma Neg Hx     Macular degeneration Neg Hx     Retinal detachment Neg Hx     Strabismus Neg Hx     Thyroid disease Neg Hx        Social History  Social History     Socioeconomic History    Marital status:      Spouse name: Not on file    Number of children: Not on file    Years of education: Not on file    Highest education level: Not on file   Occupational History    Not on file   Social Needs    Financial resource strain: Not on file    Food insecurity:     Worry: Not on file     Inability: Not on file    Transportation needs:     Medical: Not on file     Non-medical: Not on file   Tobacco Use    Smoking status: Current Every Day Smoker     Packs/day: 1.50     Years: 42.00     Pack years: 63.00     Types: Cigarettes    Smokeless tobacco: Never  Used   Substance and Sexual Activity    Alcohol use: No    Drug use: No    Sexual activity: Yes     Partners: Female   Lifestyle    Physical activity:     Days per week: Not on file     Minutes per session: Not on file    Stress: Not on file   Relationships    Social connections:     Talks on phone: Not on file     Gets together: Not on file     Attends [employer] service: Not on file     Active member of club or organization: Not on file     Attends meetings of clubs or organizations: Not on file     Relationship status: Not on file    Intimate partner violence:     Fear of current or ex partner: Not on file     Emotionally abused: Not on file     Physically abused: Not on file     Forced sexual activity: Not on file   Other Topics Concern    Not on file   Social History Narrative    Not on file       Current Medications  Current Outpatient Medications on File Prior to Visit   Medication Sig Dispense Refill    [DISCONTINUED] gabapentin (NEURONTIN) 800 MG tablet Take 1 tablet (800 mg total) by mouth 2 (two) times daily. 180 tablet 0    HYDROcodone-acetaminophen (NORCO) 7.5-325 mg per tablet Take 1 tablet by mouth every 24 hours as needed for Pain. 30 tablet 0    [DISCONTINUED] meloxicam (MOBIC) 15 MG tablet Take 1 tablet (15 mg total) by mouth once daily. Do not take with naprosyn / other nsaids 90 tablet 1     No current facility-administered medications on file prior to visit.        Allergies   Review of patient's allergies indicates:   Allergen Reactions    Penicillins Hives         ROS  Review of Systems   Constitutional: Negative for fatigue, fever and unexpected weight change.   Respiratory: Negative for cough, chest tightness, shortness of breath and wheezing.    Cardiovascular: Negative for chest pain and palpitations.   Gastrointestinal: Negative for abdominal pain and nausea.   Musculoskeletal: Positive for arthralgias, joint swelling and neck pain. Negative for myalgias.   Skin: Negative for  "pallor, rash and wound.   Neurological: Positive for numbness. Negative for dizziness, weakness, light-headedness and headaches.   Hematological: Negative for adenopathy.   Psychiatric/Behavioral: The patient is not nervous/anxious.          Objective:    BP (!) 140/76 (BP Location: Right arm, Patient Position: Sitting, BP Method: Large (Manual))   Pulse 70   Temp 98.1 °F (36.7 °C) (Oral)   Ht 6' 1" (1.854 m)   Wt (!) 138.4 kg (305 lb 1.9 oz)   SpO2 96%   BMI 40.26 kg/m²     Physical Exam   Constitutional: Vital signs are normal.   HENT:   Head: Normocephalic.   Eyes: Pupils are equal, round, and reactive to light. EOM are normal.   Neck: Neck supple. Carotid bruit is not present. No thyroid mass present.   Cardiovascular: Normal rate, regular rhythm, S1 normal, S2 normal and intact distal pulses. Exam reveals no gallop and no friction rub.   No murmur heard.  Pulses:       Dorsalis pedis pulses are 2+ on the right side, and 2+ on the left side.   Pulmonary/Chest: Effort normal and breath sounds normal. He has no wheezes. He has no rales.   Musculoskeletal:        Right shoulder: Normal.        Left shoulder: Normal.        Right ankle: He exhibits swelling. He exhibits normal range of motion and no deformity.        Left ankle: Normal.   Lymphadenopathy:     He has no cervical adenopathy.        Right: No supraclavicular adenopathy present.        Left: No supraclavicular adenopathy present.   Neurological: He is alert. He has normal strength. No cranial nerve deficit or sensory deficit.   Skin: Skin is warm, dry and intact. No rash noted.   Psychiatric: He has a normal mood and affect. Judgment normal.       Assessment/Plan:  Srinivasan Munoz is a 59 y.o. male who presents today for :    Srinivasan was seen today for ankle pain, shoulder pain and spasms.    Diagnoses and all orders for this visit:    Numbness and tingling in left arm, with new dyspnea on exertion, will evaluate EKG for possible cardiac " ischemia  -     IN OFFICE EKG 12-LEAD (to Muse)    Chronic pain in left shoulder, previously followed by ortho, will try PT and refer to pain clinic for better management, continue with NSAID therapy as needed  -     Ambulatory Referral to Physical/Occupational Therapy  -     meloxicam (MOBIC) 15 MG tablet; Take 1 tablet (15 mg total) by mouth once daily. Do not take with naprosyn / other nsaids  -     Ambulatory referral to Pain Clinic    Degenerative cervical disc, continue gabapentin and NSAID therapy, will refer to neuro for further evaluation   -     Ambulatory referral to Neurosurgery  -     meloxicam (MOBIC) 15 MG tablet; Take 1 tablet (15 mg total) by mouth once daily. Do not take with naprosyn / other nsaids  -     Ambulatory referral to Pain Clinic  -     gabapentin (NEURONTIN) 800 MG tablet; Take 1 tablet (800 mg total) by mouth 2 (two) times daily.    Chronic pain of right heel, surgery recommended from ortho, patient declined, will try PT and refer to pain clinic  -     Ambulatory Referral to Physical/Occupational Therapy  -     meloxicam (MOBIC) 15 MG tablet; Take 1 tablet (15 mg total) by mouth once daily. Do not take with naprosyn / other nsaids  -     Ambulatory referral to Pain Clinic      Counseled patient on the clinical course of conditions including symptomatology, treatment and prevention  Counseled regarding risks, benefits, and limitations of medications  Advised patient to seek urgent/emergent care if symptoms intensify  Sent patient with informational material about diagnosis  Patient gave verbal understanding and agreement of plan        Problem list issues addressed during this visit    Degenerative cervical disc  On 1600 mg daily of gabapentin, reports his pain to be uncontrolled and radiculopathy worsening. Will refer to neurosurgery    Chronic pain of right heel  Previously followed by ortho, surgery recommended, patient declined surgery at the time, reports his pain and swelling to  be more frequent and uncontrolled. Will refer to pain and PT.     Obesity (BMI 30-39.9)  Discussed diet/physical activity for maintenance of overall fitness and chronic disease management goals           Health maintenance reviewed and disussed, deferred at this time due to acute illness      I spent >50% of this 40 minute encounter counseling the patient on diagnoses, risk factors, and treatment.           Encouraged to call/return to clinic if symptoms persist or worsen    Mary Westbrook PA-C  [facility]/ Internal Med/ Peds

## 2019-03-26 NOTE — ASSESSMENT & PLAN NOTE
On 1600 mg daily of gabapentin, reports his pain to be uncontrolled and radiculopathy worsening. Will refer to neurosurgery

## 2019-03-27 NOTE — ASSESSMENT & PLAN NOTE
Discussed diet/physical activity for maintenance of overall fitness and chronic disease management goals

## 2019-04-11 ENCOUNTER — OFFICE VISIT (OUTPATIENT)
Dept: PAIN MEDICINE | Facility: CLINIC | Age: 59
End: 2019-04-11
Payer: COMMERCIAL

## 2019-04-11 VITALS
OXYGEN SATURATION: 96 % | BODY MASS INDEX: 41.35 KG/M2 | DIASTOLIC BLOOD PRESSURE: 70 MMHG | SYSTOLIC BLOOD PRESSURE: 128 MMHG | HEART RATE: 68 BPM | WEIGHT: 312 LBS | HEIGHT: 73 IN | RESPIRATION RATE: 18 BRPM

## 2019-04-11 DIAGNOSIS — G89.29 CHRONIC LEFT SHOULDER PAIN: ICD-10-CM

## 2019-04-11 DIAGNOSIS — M19.012 GLENOHUMERAL ARTHRITIS, LEFT: Primary | ICD-10-CM

## 2019-04-11 DIAGNOSIS — M50.30 DDD (DEGENERATIVE DISC DISEASE), CERVICAL: ICD-10-CM

## 2019-04-11 DIAGNOSIS — M54.12 CERVICAL RADICULOPATHY: ICD-10-CM

## 2019-04-11 DIAGNOSIS — M47.22 OSTEOARTHRITIS OF SPINE WITH RADICULOPATHY, CERVICAL REGION: ICD-10-CM

## 2019-04-11 DIAGNOSIS — M25.512 CHRONIC LEFT SHOULDER PAIN: ICD-10-CM

## 2019-04-11 PROCEDURE — 99999 PR PBB SHADOW E&M-EST. PATIENT-LVL III: CPT | Mod: PBBFAC,,, | Performed by: PAIN MEDICINE

## 2019-04-11 PROCEDURE — 99244 OFF/OP CNSLTJ NEW/EST MOD 40: CPT | Mod: S$GLB,,, | Performed by: PAIN MEDICINE

## 2019-04-11 PROCEDURE — 99244 PR OFFICE CONSULTATION,LEVEL IV: ICD-10-PCS | Mod: S$GLB,,, | Performed by: PAIN MEDICINE

## 2019-04-11 PROCEDURE — 99999 PR PBB SHADOW E&M-EST. PATIENT-LVL III: ICD-10-PCS | Mod: PBBFAC,,, | Performed by: PAIN MEDICINE

## 2019-04-11 NOTE — H&P (VIEW-ONLY)
Subjective:     Patient ID: Srinivasan Munoz is a 59 y.o. male    Chief Complaint: Neck Pain (patient experiences chronic pain in left shoulder - degenerative cervical disc- patient previously had cervical injection w/ Dr. Barnett- patient experiences stiffness in neck- treatment w/ medication, PT (pending))      Referred by: Mary Westbrook PA-C      HPI:    Initial Encounter (4/11/19):  Srinivasan Munoz is a 59 y.o. male who presents today with multiple pain complaints.  These include chronic left shoulder pain, chronic neck pain and chronic right heel pain. Primary amongst these is chronic left shoulder pain. This pain has been present for years.  Patient has failed injections, therapy and medications. He has been treated by orthopedic surgery in the past and was told that he needs a shoulder replacement.  The shoulder pain is located primarily in the posterior left shoulder.  The pain is worse with overhead activities.  This pain is limiting his ability to perform his job.  He also has chronic neck pain that radiates the bilateral upper extremities with associated numbness.  A nerve conduction study/EMG and cervical MRI have been performed but I do not have records of these at this time.  This pain is described in detail below.    Physical Therapy:  Yes.  Not effective    Non-pharmacologic Treatment:  Rest helps         · TENS?  No    Pain Medications:         · Currently taking:  Gabapentin, meloxicam    · Has tried in the past:  Tylenol, Norco    · Has not tried:  Muscle relaxants, TCAs, SNRIs, topical creams    Blood thinners:  None    Interventional Therapies:  Cervical epidural steroid injection done by Dr. Barnett, this helped with his neck pain for short period time. Did not improve his shoulder pain at all.    Relevant Surgeries:  None    Affecting sleep?  Yes    Affecting daily activities? yes    Depressive symptoms? no          · SI/HI? No    Work status: Employed    Pain Scores:    Best:        5/10  Worst:     8/10  Usually:   7/10  Today:    6/10    Review of Systems   Constitutional: Negative for activity change, appetite change, chills, fatigue, fever and unexpected weight change.   HENT: Negative for hearing loss.    Eyes: Negative for visual disturbance.   Respiratory: Negative for chest tightness and shortness of breath.    Cardiovascular: Negative for chest pain.   Gastrointestinal: Negative for abdominal pain, constipation, diarrhea, nausea and vomiting.   Genitourinary: Negative for difficulty urinating.   Musculoskeletal: Positive for arthralgias, myalgias, neck pain and neck stiffness. Negative for back pain and gait problem.   Skin: Negative for rash.   Neurological: Positive for numbness. Negative for dizziness, weakness, light-headedness and headaches.   Psychiatric/Behavioral: Positive for sleep disturbance. Negative for hallucinations and suicidal ideas. The patient is not nervous/anxious.        Past Medical History:   Diagnosis Date    Arthritis     Eye injury 20 yrs ago    nail stuck in od     Fractured bone     right foot    Nuclear sclerosis of left eye 6/25/2018    Retinal detachment 20 yrs ago     od        Past Surgical History:   Procedure Laterality Date    CATARACT EXTRACTION Left 06/25/2018    Dr. Pa    CATARACT EXTRACTION W/  INTRAOCULAR LENS IMPLANT Right 1999    Dr. Landry    EYE SURGERY      INSERTION-INTRAOCULAR LENS (IOL) Left 6/25/2018    [facility]    LASIK Bilateral 2000    PHACOEMULSIFICATION-ASPIRATION-CATARACT Left 6/25/2018    [facility]    RETINAL DETACHMENT SURGERY Right 1999    Dr. Landry       Social History     Socioeconomic History    Marital status:      Spouse name: Not on file    Number of children: Not on file    Years of education: Not on file    Highest education level: Not on file   Occupational History    Not on file   Social Needs    Financial resource strain: Not on  "file    Food insecurity:     Worry: Not on file     Inability: Not on file    Transportation needs:     Medical: Not on file     Non-medical: Not on file   Tobacco Use    Smoking status: Current Every Day Smoker     Packs/day: 1.50     Years: 42.00     Pack years: 63.00     Types: Cigarettes    Smokeless tobacco: Never Used   Substance and Sexual Activity    Alcohol use: No    Drug use: No    Sexual activity: Yes     Partners: Female   Lifestyle    Physical activity:     Days per week: Not on file     Minutes per session: Not on file    Stress: Not on file   Relationships    Social connections:     Talks on phone: Not on file     Gets together: Not on file     Attends [employer] service: Not on file     Active member of club or organization: Not on file     Attends meetings of clubs or organizations: Not on file     Relationship status: Not on file   Other Topics Concern    Not on file   Social History Narrative    Not on file       Review of patient's allergies indicates:   Allergen Reactions    Penicillins Hives       Current Outpatient Medications on File Prior to Visit   Medication Sig Dispense Refill    gabapentin (NEURONTIN) 800 MG tablet Take 1 tablet (800 mg total) by mouth 2 (two) times daily. 180 tablet 1    meloxicam (MOBIC) 15 MG tablet Take 1 tablet (15 mg total) by mouth once daily. Do not take with naprosyn / other nsaids 90 tablet 1    HYDROcodone-acetaminophen (NORCO) 7.5-325 mg per tablet Take 1 tablet by mouth every 24 hours as needed for Pain. 30 tablet 0     No current facility-administered medications on file prior to visit.        Objective:      /70   Pulse 68   Resp 18   Ht 6' 1" (1.854 m)   Wt (!) 141.5 kg (312 lb)   SpO2 96%   BMI 41.16 kg/m²     Exam:  GEN:  Well developed, well nourished.  No acute distress.  No pain behavior.  HEENT:  No trauma.  Mucous membranes moist.  Nares patent bilaterally.  PSYCH: Normal affect. Thought content appropriate.  CHEST:  " Breathing symmetric.  No audible wheezing.  ABD: Soft, non-distended.  SKIN:  Warm, pink, dry.  No rash on exposed areas.    EXT:  No cyanosis, clubbing, or edema.  No color change or changes in nail or hair growth.  NEURO/MUSCULOSKELETAL:  Fully alert, oriented, and appropriate. Speech normal dior. No cranial nerve deficits.   Gait:  Normal.  5/5 motor strength throughout upper extremities.   Sensory:  Decreased sensation to light touch in the left C4 through C6 dermatomes.     Reflexes:  2 + and symmetric throughout.  Negative Pang's bilaterally.  C-Spine:  Slightly limited ROM with pain on extension. Positive facet loading bilaterally.  Negative Spurling's bilaterally.      Full active range of motion of bilateral shoulders  Left shoulder pain with shoulder abduction past 60°  Negative Cintron on the left  Tenderness to palpation of the anterior glenohumeral joint  Tenderness to palpation of the left supraspinatus and upper trapezius muscles            Imaging:  No pertinent imaging available at this time    Assessment:       Encounter Diagnoses   Name Primary?    Glenohumeral arthritis, left Yes    Chronic left shoulder pain     DDD (degenerative disc disease), cervical     Osteoarthritis of spine with radiculopathy, cervical region     Cervical radiculopathy          Plan:       Srinivasan was seen today for neck pain.    Diagnoses and all orders for this visit:    Glenohumeral arthritis, left  -     Case request GI: Axillary, Suprascapular, and Lateral Pectoral Nerve Blocks    Chronic left shoulder pain  -     Case request GI: Axillary, Suprascapular, and Lateral Pectoral Nerve Blocks    DDD (degenerative disc disease), cervical    Osteoarthritis of spine with radiculopathy, cervical region    Cervical radiculopathy        Srinivasan Munoz is a 59 y.o. male with chronic left shoulder pain.  Likely from glenohumeral arthritis.  Also with chronic neck pain likely a facet etiology though may have some  left-sided radicular pain/numbness as well.    1.  Schedule for left nerve blocks of the articular branches of the left axillary, suprascapular and lateral pectoral nerves.  If diagnostic can proceed with radiofrequency ablation.  2.  Release of information for previous imaging and nerve conduction studies.  I will review these when available.  I need to review previous shoulder x-rays prior to performing nerve block/RFA.  If no external x-rays can be obtained in I will order new ones.  3.  Return to clinic 2 weeks after procedures.  At that time we will discuss efficacy of nerve block/RFA.  If getting good relief of shoulder pain then may consider repeat cervical epidural versus cervical medial branch blocks.

## 2019-04-11 NOTE — PROGRESS NOTES
Mr. Munoz will be scheduled for left axillary, suprascapular and lateral pectoral nerve blocks under fluoroscopy on 04/24/2019.  Reviewed the pre-procedure instructions listed below with him- copy also provided.  Instructed patient to notify clinic if he begins feeling ill, runs a fever, is prescribed antibiotics, and/or has any outpatient procedures within the two weeks leading up to this procedure.  Instructed patient to report to [facility], 2nd Floor Endoscopy Registration Desk.  All questions answered- patient verbalized understanding.     Day of Procedure  - Ensure you have obtained an arrival time from the Pain Management Staff  o Procedure Area will call 1-3 days in advance with your arrival time.  Please check any voicemails received.  o If you arrive past your scheduled procedure time, you may be asked to reschedule your procedure.  - Ensure you have a  with you to remain present throughout your procedure for your safety.  o If you arrive without a responsible adult to stay with you and drive you home, you may be asked to reschedule your procedure.  - Take all of your prescribed medications (exceptions noted below) with a small amount of water  - This is NOT a fasting procedure, you may have a light meal before coming.  - Wear comfortable clothing (loose fitting pants).  - You may wear glasses, dentures, contact lenses, and/or hearing aids. Please remove all jewelry and metal hairpins.  - Notify the nurse during the intake process if you are allergic to any medications, if you are diabetic, or if you are not feeling well  - Contact the Pain Management Clinic with the following:  o A fever greater than 100° (degrees)   o Feel ill, have any type of infection, or are taking antibiotics now or within the two (2) weeks leading up to this procedure  o Have any outpatient procedures within the two (2) weeks leading up to this procedure (colonoscopy, major dental work, etc.)    IF you are  taking blood thinners: Only upon receiving clearance and notification from the Pain Management Department  7 Days Prior to Your Procedure:  - Stop taking Plavix/Clopridogrel, Effient/Prasugel, ASA  5 Days Prior to Your Procedure:  - Stop taking Coumadin/Warfarin.  An INR may be drawn prior to your procedure.  If labs are required, you will need to arrive earlier than your scheduled arrival time.  - Stop taking Pradaxa/Dabigatra,  Brilinta/ Ticagrelor  3 Days Prior to Your Procedure:  - Stop taking Xarelto/Rivaroxaban, Eliquis/Apixaban, Aggrenox/Dipyridamole, Reopro/Abciximab

## 2019-04-11 NOTE — PROGRESS NOTES
Subjective:     Patient ID: Srinivasan Munoz is a 59 y.o. male    Chief Complaint: Neck Pain (patient experiences chronic pain in left shoulder - degenerative cervical disc- patient previously had cervical injection w/ Dr. Barnett- patient experiences stiffness in neck- treatment w/ medication, PT (pending))      Referred by: Mary Westbrook PA-C      HPI:    Initial Encounter (4/11/19):  Srinivasan Munoz is a 59 y.o. male who presents today with multiple pain complaints.  These include chronic left shoulder pain, chronic neck pain and chronic right heel pain. Primary amongst these is chronic left shoulder pain. This pain has been present for years.  Patient has failed injections, therapy and medications. He has been treated by orthopedic surgery in the past and was told that he needs a shoulder replacement.  The shoulder pain is located primarily in the posterior left shoulder.  The pain is worse with overhead activities.  This pain is limiting his ability to perform his job.  He also has chronic neck pain that radiates the bilateral upper extremities with associated numbness.  A nerve conduction study/EMG and cervical MRI have been performed but I do not have records of these at this time.  This pain is described in detail below.    Physical Therapy:  Yes.  Not effective    Non-pharmacologic Treatment:  Rest helps         · TENS?  No    Pain Medications:         · Currently taking:  Gabapentin, meloxicam    · Has tried in the past:  Tylenol, Norco    · Has not tried:  Muscle relaxants, TCAs, SNRIs, topical creams    Blood thinners:  None    Interventional Therapies:  Cervical epidural steroid injection done by Dr. Barnett, this helped with his neck pain for short period time. Did not improve his shoulder pain at all.    Relevant Surgeries:  None    Affecting sleep?  Yes    Affecting daily activities? yes    Depressive symptoms? no          · SI/HI? No    Work status: Employed    Pain Scores:    Best:        5/10  Worst:     8/10  Usually:   7/10  Today:    6/10    Review of Systems   Constitutional: Negative for activity change, appetite change, chills, fatigue, fever and unexpected weight change.   HENT: Negative for hearing loss.    Eyes: Negative for visual disturbance.   Respiratory: Negative for chest tightness and shortness of breath.    Cardiovascular: Negative for chest pain.   Gastrointestinal: Negative for abdominal pain, constipation, diarrhea, nausea and vomiting.   Genitourinary: Negative for difficulty urinating.   Musculoskeletal: Positive for arthralgias, myalgias, neck pain and neck stiffness. Negative for back pain and gait problem.   Skin: Negative for rash.   Neurological: Positive for numbness. Negative for dizziness, weakness, light-headedness and headaches.   Psychiatric/Behavioral: Positive for sleep disturbance. Negative for hallucinations and suicidal ideas. The patient is not nervous/anxious.        Past Medical History:   Diagnosis Date    Arthritis     Eye injury 20 yrs ago    nail stuck in od     Fractured bone     right foot    Nuclear sclerosis of left eye 6/25/2018    Retinal detachment 20 yrs ago     od        Past Surgical History:   Procedure Laterality Date    CATARACT EXTRACTION Left 06/25/2018    Dr. Pa    CATARACT EXTRACTION W/  INTRAOCULAR LENS IMPLANT Right 1999    Dr. Landry    EYE SURGERY      INSERTION-INTRAOCULAR LENS (IOL) Left 6/25/2018    [facility]    LASIK Bilateral 2000    PHACOEMULSIFICATION-ASPIRATION-CATARACT Left 6/25/2018    [facility] at [facility]    RETINAL DETACHMENT SURGERY Right 1999    Dr. Landry       Social History     Socioeconomic History    Marital status:      Spouse name: Not on file    Number of children: Not on file    Years of education: Not on file    Highest education level: Not on file   Occupational History    Not on file   Social Needs    Financial resource strain: Not on  "file    Food insecurity:     Worry: Not on file     Inability: Not on file    Transportation needs:     Medical: Not on file     Non-medical: Not on file   Tobacco Use    Smoking status: Current Every Day Smoker     Packs/day: 1.50     Years: 42.00     Pack years: 63.00     Types: Cigarettes    Smokeless tobacco: Never Used   Substance and Sexual Activity    Alcohol use: No    Drug use: No    Sexual activity: Yes     Partners: Female   Lifestyle    Physical activity:     Days per week: Not on file     Minutes per session: Not on file    Stress: Not on file   Relationships    Social connections:     Talks on phone: Not on file     Gets together: Not on file     Attends Congregational service: Not on file     Active member of club or organization: Not on file     Attends meetings of clubs or organizations: Not on file     Relationship status: Not on file   Other Topics Concern    Not on file   Social History Narrative    Not on file       Review of patient's allergies indicates:   Allergen Reactions    Penicillins Hives       Current Outpatient Medications on File Prior to Visit   Medication Sig Dispense Refill    gabapentin (NEURONTIN) 800 MG tablet Take 1 tablet (800 mg total) by mouth 2 (two) times daily. 180 tablet 1    meloxicam (MOBIC) 15 MG tablet Take 1 tablet (15 mg total) by mouth once daily. Do not take with naprosyn / other nsaids 90 tablet 1    HYDROcodone-acetaminophen (NORCO) 7.5-325 mg per tablet Take 1 tablet by mouth every 24 hours as needed for Pain. 30 tablet 0     No current facility-administered medications on file prior to visit.        Objective:      /70   Pulse 68   Resp 18   Ht 6' 1" (1.854 m)   Wt (!) 141.5 kg (312 lb)   SpO2 96%   BMI 41.16 kg/m²     Exam:  GEN:  Well developed, well nourished.  No acute distress.  No pain behavior.  HEENT:  No trauma.  Mucous membranes moist.  Nares patent bilaterally.  PSYCH: Normal affect. Thought content appropriate.  CHEST:  " Breathing symmetric.  No audible wheezing.  ABD: Soft, non-distended.  SKIN:  Warm, pink, dry.  No rash on exposed areas.    EXT:  No cyanosis, clubbing, or edema.  No color change or changes in nail or hair growth.  NEURO/MUSCULOSKELETAL:  Fully alert, oriented, and appropriate. Speech normal dior. No cranial nerve deficits.   Gait:  Normal.  5/5 motor strength throughout upper extremities.   Sensory:  Decreased sensation to light touch in the left C4 through C6 dermatomes.     Reflexes:  2 + and symmetric throughout.  Negative Pang's bilaterally.  C-Spine:  Slightly limited ROM with pain on extension. Positive facet loading bilaterally.  Negative Spurling's bilaterally.      Full active range of motion of bilateral shoulders  Left shoulder pain with shoulder abduction past 60°  Negative Cintron on the left  Tenderness to palpation of the anterior glenohumeral joint  Tenderness to palpation of the left supraspinatus and upper trapezius muscles            Imaging:  No pertinent imaging available at this time    Assessment:       Encounter Diagnoses   Name Primary?    Glenohumeral arthritis, left Yes    Chronic left shoulder pain     DDD (degenerative disc disease), cervical     Osteoarthritis of spine with radiculopathy, cervical region     Cervical radiculopathy          Plan:       Srinivasan was seen today for neck pain.    Diagnoses and all orders for this visit:    Glenohumeral arthritis, left  -     Case request GI: Axillary, Suprascapular, and Lateral Pectoral Nerve Blocks    Chronic left shoulder pain  -     Case request GI: Axillary, Suprascapular, and Lateral Pectoral Nerve Blocks    DDD (degenerative disc disease), cervical    Osteoarthritis of spine with radiculopathy, cervical region    Cervical radiculopathy        Srinivasan Munoz is a 59 y.o. male with chronic left shoulder pain.  Likely from glenohumeral arthritis.  Also with chronic neck pain likely a facet etiology though may have some  left-sided radicular pain/numbness as well.    1.  Schedule for left nerve blocks of the articular branches of the left axillary, suprascapular and lateral pectoral nerves.  If diagnostic can proceed with radiofrequency ablation.  2.  Release of information for previous imaging and nerve conduction studies.  I will review these when available.  I need to review previous shoulder x-rays prior to performing nerve block/RFA.  If no external x-rays can be obtained in I will order new ones.  3.  Return to clinic 2 weeks after procedures.  At that time we will discuss efficacy of nerve block/RFA.  If getting good relief of shoulder pain then may consider repeat cervical epidural versus cervical medial branch blocks.

## 2019-04-11 NOTE — PROGRESS NOTES
"  [facility]  Physical Therapy Initial Evaluation    Name: Srinivasan Munoz  Clinic Number: [id number]    Therapy Diagnosis:   Encounter Diagnoses   Name Primary?    Acute pain of left shoulder     Cervical pain     Neck stiffness     Numbness and tingling of both upper extremities      Physician: Mary Westbrook, MAE    Physician Orders: PT Eval and Treat   Medical Diagnosis from Referral: Chronic pain in left shoulder; Chronic pain of right heel;  Evaluation Date: 4/12/2019  Authorization Period Expiration: 12/31/2019  Plan of Care Expiration: 7/12/2019  Visit # / Visits authorized: 1/ 20    Time In: 0650  Time Out: 0750  Total Billable Time: 60 minutes    Precautions: Standard    Subjective   Date of onset: 6 months  History of current condition - Wan reports: he has been having some bad shoulder pain, for about 6 months, states he went to an orthopedic who recommends shoulder replacement if conservative treatment does not work, reports when reaching behind his back it feels like someone is stabbing him in his shoulder. Reports that he can only lift his L arm over his head " a little bit" then he starts to get that same stabbing pain. Denies any recollection of having an injury in his L shoulder. Pt fell of a crane about 20 years ago, have a heel fracture, and has had pain in it ever since but reports his pain has been getting worse. Reports he occasionally gets numbness and tingling. Has been having trouble sleeping, reports getting about 4-5 hours per sleep at night. 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 Reports his neck and shoulder pain are his primary concerns.      Medical History:   Past Medical History:   Diagnosis Date    Arthritis     Eye injury 20 yrs ago    nail stuck in od     Fractured bone     right foot    Nuclear sclerosis of left eye 6/25/2018    Retinal detachment 20 yrs ago     " od        Surgical History:   Srinivasan Munoz  has a past surgical history that includes Eye surgery; Cataract extraction w/  intraocular lens implant (Right, 1999); Retinal detachment surgery (Right, 1999); Lasik (Bilateral, 2000); Cataract extraction (Left, 06/25/2018); Phacoemulsification of cataract (Left, 6/25/2018); and Intraocular prosthesis insertion (Left, 6/25/2018).    Medications:   Srinivasan has a current medication list which includes the following prescription(s): gabapentin, hydrocodone-acetaminophen, and meloxicam.    Allergies:   Review of patient's allergies indicates:   Allergen Reactions    Penicillins Hives        Imaging:     Prior Therapy: Never had any therapy  Social History: Pt lives with his wife  Occupation: Construction work on ships and plants  Prior Level of Function: Pt was able to manage his daily   Current Level of Function: Pt reports his mood has been afected by his pain, used to do wood working at home but no longer able to do his hobby d/t pain. Pt getting pain with lifting and climbing ladders at work. Having difficulty doing his work, getting a lot pain.    Pain:  Current 6/10, worst 10/10, best 0/10   Location: central neck and L shoulder   Description: Aching, Sharp and Shooting  Aggravating Factors: looking up in neck, reaching behind his back  Easing Factors: pain medication    Pts goals: Pt would like to decrease the pain in his arma nd shoulder so that he can perform his work and home activities without pain.    Objective     Observation: Pt alert and oriented x4    Posture Alignment: slouched posture;forward head;   Shoulder rotation at rest: Internally rotated    Sensation: Light touch: Intact t/o, diminished sensation in B hands and lower arms    DTR: WNL    GAIT DEVIATIONS: Srinivasan displays antalgic gait;    Cervical Range of Motion:    Degrees Pain   Flexion WNL Felt relief with bending     Extension 18 Pain with      Right Rotation 38 Pain in L and R side of  neck     Left Rotation 50 Pain in L side of neck     Right Side Bending 25 Pain in L side of neck   Left Side Bending 35 No increase pain     Cervical Special Tests:  Compression: positive  Spurling's:  negative  ULTT:  negative    Passive Range of Motion (degrees):   Shoulder Right Left   Flexion 175 136   Abduction 173 130   Fucntional ER  T5 C4   IR  T10 Unable         Upper Extremity Strength  (R) UE  (L) UE    Elbow flexion: 5/5 Elbow flexion: 4/5 *   Elbow extension: 5/5 Elbow extension: 4/5*   Shoulder elevation: 5/5 Shoulder elevation: 5/5   Shoulder flexion: 5/5 Shoulder flexion: 4/5*   Shoulder Abduction: 5/5 Shoulder abduction: 5/5   Shoulder ER 5/5 Shoulder ER 4/5*   Shoulder IR 5/5 Shoulder IR 4/5*   Lower Trap 3+/5 Lower Trap 3+/5*   Middle Trap 3+/5 Middle Trap 3+/5*   Rhomboids 5/5 Rhomboids 5/5       Special Tests:    Impingement   Right Left   Neer's  negative  positive   Cintron-Sadi  negative  positive     Rotator Cuff:     Right Left   Drop Arm Test  negative  positive   Subscapularis Lift Off Test  negative  Unable to obtain test position   ER Lag Sign  negative  negative   IR Lag Sign  negative  negative   Empty Can Test  negative positive   Full Can Test negative Positive (increased pain)   Scapular Retraction Test negative Positive Upper trap dominance     Instability:     Right Left   Sulcus Sign negative negative   Anterior Apprehension Test negative negative   Anterior Drawer Test negative negative   Posterior Apprehension Test negative Unable to obtain test position     AC Joint/Biceps:     Right Left   AC Joint Compression Test negative negative   Speed's test negative negative       Joint Mobility:    Cervicothoracic: Hypomobility throughout, pain felt with  to C5-T1    Palpation: TTP of L subscap and infraspinatus tendons. Palpable tightness to B upper trap and thoracic paraspinal    Flexibility:     Scapular Control/Dyskinesis:    Normal / Subtle / Obvious  Comments    Left   "Obvious   Limited upward rotation of scap   Right  Obvious Limited upward rotation of scap           TREATMENT   Treatment Time In: 0740  Treatment Time Out: 0750  Total Treatment time separate from Evaluation: 10 minutes    Wan received therapeutic exercises to develop strength, endurance, ROM and posture for 10 minutes including:  -Cervical extension isometric with two pillows under head 20x2"holds  -Door pec stretch 3x30"  -Rows with RTB cues to relax B UT 2x20  -Wall crawls with RUE 2x10    Home Exercises and Patient Education Provided    Education provided:   - Pt educated on proper posture and positioning with sitting, standing, lying, and while at work.  - Pt educated on L shoulder arthritis and L rotator cuff impingement.  - Pt educated on proper form and technique with exercises.     Written Home Exercises Provided: yes.  Exercises were reviewed and Wan was able to demonstrate them prior to the end of the session.  Wan demonstrated good  understanding of the education provided.     See EMR under Patient Instructions for exercises provided 4/12/2019.    Assessment   Srinivasan is a 59 y.o. male referred to outpatient Physical Therapy with a medical diagnosis of Chronic left shoulder and right heel pain. Pt presents with c/o cervical pain, L shoulder pain, and R heel pain. Pt reports his L shoulder and cervical pain are primary as his R heel has been chronic for over 20 years from an injury he sustained when falling off a crane. Pt has worked as a  and a  his entire career and has many ailments which have progressed over time. Pt demonstrating s/s of cervical nerve impingement as he feels numbness and tingling bilaterally, chance of peripheral nerve impingement in L shoulder as he has has increased radial/ulnar nerve sensation deficits in LUE, however it was hard to differentiate d/t presence of cervical impingement, will cont to monitor. Pt have decreased cervical and L " shoulder ROM d/t pain and stiffness. Demonstrating s/s consistent with L RC impingement. Overall pt demonstrating decreased ROM, decreased LUE strength, increased pain, and decreased functional mobility.    Pt prognosis is Good.   Pt will benefit from skilled outpatient Physical Therapy to address the deficits stated above and in the chart below, provide pt/family education, and to maximize pt's level of independence.     Plan of care discussed with patient: Yes  Pt's spiritual, cultural and educational needs considered and patient is agreeable to the plan of care and goals as stated below:     Anticipated Barriers for therapy: None    Medical Necessity is demonstrated by the following  History  Co-morbidities and personal factors that may impact the plan of care Co-morbidities:   arthritis    Personal Factors:   no deficits     low   Examination  Body Structures and Functions, activity limitations and participation restrictions that may impact the plan of care Body Regions:   neck  lower extremities  upper extremities    Body Systems:    gross symmetry  ROM  strength  gross coordinated movement  balance  gait  transfers    Participation Restrictions:   None    Activity limitations:   Learning and applying knowledge  no deficits    General Tasks and Commands  no deficits    Communication  no deficits    Mobility  lifting and carrying objects  walking  using transportation (bus, train, plane, car)  driving (bike, car, motorcycle)    Self care  washing oneself (bathing, drying, washing hands)  dressing    Domestic Life  shopping  cooking  doing house work (cleaning house, washing dishes, laundry)    Interactions/Relationships  no deficits    Life Areas  no deficits    Community and Social Life  no deficits         Complexity: low  See FOTO outcome assessment    Clinical Presentation stable and uncomplicated low   Decision Making/ Complexity Score: low     GOALS: Short Term Goals: 5 weeks  1.Report decreased in pain at  worse less than  <   / =  4  /10  to increase tolerance for functional mobility.  2. Pt to improve L shoulder flexion range of motion by 10' to allow for improved functional mobility to allow for improvement in IADLs.   3. Increased L shoulder IR/ER MMT 1/2 grade to increase tolerance for ADL and work activities.  4. Pt to report be conscious of impaired sitting and standing posture daily to decrease pain   5. Pt to tolerate HEP to improve ROM and independence with ADL's    Long Term Goals: 10 weeks  1.Report decreased in pain at worse less than  <   / =  2  /10  to increase tolerance for functional mobility  2.  Patient will demonstrate improved overall function per FOTO Survey to CJ = at least 20% but < 40% impaired, limited or restricted score or less.  3.Increased L shoulder ER/IR MMT 1 grade to increase tolerance for ADL and work activities.  4. Pt to report and demonstrate proper posture in standing and sitting to decrease pain  5. Pt to be Independent with HEP to improve ROM and independence with ADL's.  6. Pt to improve L shoulder flexion range of motion by 20' to allow for improved functional mobility to allow for improvement in IADLs.        Plan of care Certification: 4/12/2019 to 7/12/2019.    Outpatient Physical Therapy 1 times weekly for 10 weeks to include the following interventions: Gait Training, Manual Therapy, Patient Education, Self Care, Therapeutic Activites and Therapeutic Exercise.     Jose Hilliard, PT

## 2019-04-11 NOTE — LETTER
April 11, 2019      Mary Westbrook PA-C  [address] [facility]  [place] [address]           [facility]  [address] [facility]  [place] [address]  Phone: [phone]  Fax: [phone]          Patient: Srinivasan Munoz   MR Number: [id number]   YOB: 1960   Date of Visit: 4/11/2019       Dear Mary Westbrook:    Thank you for referring Srinivasan Munoz to me for evaluation. Attached you will find relevant portions of my assessment and plan of care.    If you have questions, please do not hesitate to call me. I look forward to following Srinivasan Munoz along with you.    Sincerely,    Dameon Austin Jr., MD    Enclosure  CC:  No Recipients    If you would like to receive this communication electronically, please contact [email or web address] or [phone] to request more information on [employer] access.    For providers and/or their staff who would like to refer a patient to [facility], please contact us through our one-stop-shop provider referral line, [facility], at [phone].    If you feel you have received this communication in error or would no longer like to receive these types of communications, please e-mail [email or web address]

## 2019-04-12 ENCOUNTER — CLINICAL SUPPORT (OUTPATIENT)
Dept: REHABILITATION | Facility: HOSPITAL | Age: 59
End: 2019-04-12
Payer: COMMERCIAL

## 2019-04-12 DIAGNOSIS — R20.2 NUMBNESS AND TINGLING OF BOTH UPPER EXTREMITIES: ICD-10-CM

## 2019-04-12 DIAGNOSIS — M54.2 CERVICAL PAIN: ICD-10-CM

## 2019-04-12 DIAGNOSIS — M25.512 ACUTE PAIN OF LEFT SHOULDER: ICD-10-CM

## 2019-04-12 DIAGNOSIS — R20.0 NUMBNESS AND TINGLING OF BOTH UPPER EXTREMITIES: ICD-10-CM

## 2019-04-12 DIAGNOSIS — M43.6 NECK STIFFNESS: ICD-10-CM

## 2019-04-12 PROCEDURE — 97161 PT EVAL LOW COMPLEX 20 MIN: CPT | Mod: PN

## 2019-04-12 PROCEDURE — 97110 THERAPEUTIC EXERCISES: CPT | Mod: PN

## 2019-04-12 NOTE — PLAN OF CARE
"  [facility]  Physical Therapy Initial Evaluation    Name: Srinivasan Munoz  Clinic Number: [id number]    Therapy Diagnosis:   Encounter Diagnoses   Name Primary?    Acute pain of left shoulder     Cervical pain     Neck stiffness     Numbness and tingling of both upper extremities      Physician: Mary Westbrook, MAE    Physician Orders: PT Eval and Treat   Medical Diagnosis from Referral: Chronic pain in left shoulder; Chronic pain of right heel;  Evaluation Date: 4/12/2019  Authorization Period Expiration: 12/31/2019  Plan of Care Expiration: 7/12/2019  Visit # / Visits authorized: 1/ 20    Time In: 0650  Time Out: 0750  Total Billable Time: 60 minutes    Precautions: Standard    Subjective   Date of onset: 6 months  History of current condition - Wan reports: he has been having some bad shoulder pain, for about 6 months, states he went to an orthopedic who recommends shoulder replacement if conservative treatment does not work, reports when reaching behind his back it feels like someone is stabbing him in his shoulder. Reports that he can only lift his L arm over his head " a little bit" then he starts to get that same stabbing pain. Denies any recollection of having an injury in his L shoulder. Pt fell of a crane about 20 years ago, have a heel fracture, and has had pain in it ever since but reports his pain has been getting worse. Reports he occasionally gets numbness and tingling. Has been having trouble sleeping, reports getting about 4-5 hours per sleep at night. 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 Reports his neck and shoulder pain are his primary concerns.      Medical History:   Past Medical History:   Diagnosis Date    Arthritis     Eye injury 20 yrs ago    nail stuck in od     Fractured bone     right foot    Nuclear sclerosis of left eye 6/25/2018    Retinal detachment 20 yrs ago     " od        Surgical History:   Srinivasan Munoz  has a past surgical history that includes Eye surgery; Cataract extraction w/  intraocular lens implant (Right, 1999); Retinal detachment surgery (Right, 1999); Lasik (Bilateral, 2000); Cataract extraction (Left, 06/25/2018); Phacoemulsification of cataract (Left, 6/25/2018); and Intraocular prosthesis insertion (Left, 6/25/2018).    Medications:   Srinivasan has a current medication list which includes the following prescription(s): gabapentin, hydrocodone-acetaminophen, and meloxicam.    Allergies:   Review of patient's allergies indicates:   Allergen Reactions    Penicillins Hives        Imaging:     Prior Therapy: Never had any therapy  Social History: Pt lives with his wife  Occupation: Construction work on ships and plants  Prior Level of Function: Pt was able to manage his daily   Current Level of Function: Pt reports his mood has been afected by his pain, used to do wood working at home but no longer able to do his hobby d/t pain. Pt getting pain with lifting and climbing ladders at work. Having difficulty doing his work, getting a lot pain.    Pain:  Current 6/10, worst 10/10, best 0/10   Location: central neck and L shoulder   Description: Aching, Sharp and Shooting  Aggravating Factors: looking up in neck, reaching behind his back  Easing Factors: pain medication    Pts goals: Pt would like to decrease the pain in his arma nd shoulder so that he can perform his work and home activities without pain.    Objective     Observation: Pt alert and oriented x4    Posture Alignment: slouched posture;forward head;   Shoulder rotation at rest: Internally rotated    Sensation: Light touch: Intact t/o, diminished sensation in B hands and lower arms    DTR: WNL    GAIT DEVIATIONS: Srinivasan displays antalgic gait;    Cervical Range of Motion:    Degrees Pain   Flexion WNL Felt relief with bending     Extension 18 Pain with      Right Rotation 38 Pain in L and R side of  neck     Left Rotation 50 Pain in L side of neck     Right Side Bending 25 Pain in L side of neck   Left Side Bending 35 No increase pain     Cervical Special Tests:  Compression: positive  Spurling's:  negative  ULTT:  negative    Passive Range of Motion (degrees):   Shoulder Right Left   Flexion 175 136   Abduction 173 130   Fucntional ER  T5 C4   IR  T10 Unable         Upper Extremity Strength  (R) UE  (L) UE    Elbow flexion: 5/5 Elbow flexion: 4/5 *   Elbow extension: 5/5 Elbow extension: 4/5*   Shoulder elevation: 5/5 Shoulder elevation: 5/5   Shoulder flexion: 5/5 Shoulder flexion: 4/5*   Shoulder Abduction: 5/5 Shoulder abduction: 5/5   Shoulder ER 5/5 Shoulder ER 4/5*   Shoulder IR 5/5 Shoulder IR 4/5*   Lower Trap 3+/5 Lower Trap 3+/5*   Middle Trap 3+/5 Middle Trap 3+/5*   Rhomboids 5/5 Rhomboids 5/5       Special Tests:    Impingement   Right Left   Neer's  negative  positive   Cintron-Sadi  negative  positive     Rotator Cuff:     Right Left   Drop Arm Test  negative  positive   Subscapularis Lift Off Test  negative  Unable to obtain test position   ER Lag Sign  negative  negative   IR Lag Sign  negative  negative   Empty Can Test  negative positive   Full Can Test negative Positive (increased pain)   Scapular Retraction Test negative Positive Upper trap dominance     Instability:     Right Left   Sulcus Sign negative negative   Anterior Apprehension Test negative negative   Anterior Drawer Test negative negative   Posterior Apprehension Test negative Unable to obtain test position     AC Joint/Biceps:     Right Left   AC Joint Compression Test negative negative   Speed's test negative negative       Joint Mobility:    Cervicothoracic: Hypomobility throughout, pain felt with  to C5-T1    Palpation: TTP of L subscap and infraspinatus tendons. Palpable tightness to B upper trap and thoracic paraspinal    Flexibility:     Scapular Control/Dyskinesis:    Normal / Subtle / Obvious  Comments    Left   "Obvious   Limited upward rotation of scap   Right  Obvious Limited upward rotation of scap           TREATMENT   Treatment Time In: 0740  Treatment Time Out: 0750  Total Treatment time separate from Evaluation: 10 minutes    Wan received therapeutic exercises to develop strength, endurance, ROM and posture for 10 minutes including:  -Cervical extension isometric with two pillows under head 20x2"holds  -Door pec stretch 3x30"  -Rows with RTB cues to relax B UT 2x20  -Wall crawls with RUE 2x10    Home Exercises and Patient Education Provided    Education provided:   - Pt educated on proper posture and positioning with sitting, standing, lying, and while at work.  - Pt educated on L shoulder arthritis and L rotator cuff impingement.  - Pt educated on proper form and technique with exercises.     Written Home Exercises Provided: yes.  Exercises were reviewed and Wan was able to demonstrate them prior to the end of the session.  Wan demonstrated good  understanding of the education provided.     See EMR under Patient Instructions for exercises provided 4/12/2019.    Assessment   Srinivasan is a 59 y.o. male referred to outpatient Physical Therapy with a medical diagnosis of Chronic left shoulder and right heel pain. Pt presents with c/o cervical pain, L shoulder pain, and R heel pain. Pt reports his L shoulder and cervical pain are primary as his R heel has been chronic for over 20 years from an injury he sustained when falling off a crane. Pt has worked as a  and a  his entire career and has many ailments which have progressed over time. Pt demonstrating s/s of cervical nerve impingement as he feels numbness and tingling bilaterally, chance of peripheral nerve impingement in L shoulder as he has has increased radial/ulnar nerve sensation deficits in LUE, however it was hard to differentiate d/t presence of cervical impingement, will cont to monitor. Pt have decreased cervical and L " shoulder ROM d/t pain and stiffness. Demonstrating s/s consistent with L RC impingement. Overall pt demonstrating decreased ROM, decreased LUE strength, increased pain, and decreased functional mobility.    Pt prognosis is Good.   Pt will benefit from skilled outpatient Physical Therapy to address the deficits stated above and in the chart below, provide pt/family education, and to maximize pt's level of independence.     Plan of care discussed with patient: Yes  Pt's spiritual, cultural and educational needs considered and patient is agreeable to the plan of care and goals as stated below:     Anticipated Barriers for therapy: None    Medical Necessity is demonstrated by the following  History  Co-morbidities and personal factors that may impact the plan of care Co-morbidities:   arthritis    Personal Factors:   no deficits     low   Examination  Body Structures and Functions, activity limitations and participation restrictions that may impact the plan of care Body Regions:   neck  lower extremities  upper extremities    Body Systems:    gross symmetry  ROM  strength  gross coordinated movement  balance  gait  transfers    Participation Restrictions:   None    Activity limitations:   Learning and applying knowledge  no deficits    General Tasks and Commands  no deficits    Communication  no deficits    Mobility  lifting and carrying objects  walking  using transportation (bus, train, plane, car)  driving (bike, car, motorcycle)    Self care  washing oneself (bathing, drying, washing hands)  dressing    Domestic Life  shopping  cooking  doing house work (cleaning house, washing dishes, laundry)    Interactions/Relationships  no deficits    Life Areas  no deficits    Community and Social Life  no deficits         Complexity: low  See FOTO outcome assessment    Clinical Presentation stable and uncomplicated low   Decision Making/ Complexity Score: low     GOALS: Short Term Goals: 5 weeks  1.Report decreased in pain at  worse less than  <   / =  4  /10  to increase tolerance for functional mobility.  2. Pt to improve L shoulder flexion range of motion by 10' to allow for improved functional mobility to allow for improvement in IADLs.   3. Increased L shoulder IR/ER MMT 1/2 grade to increase tolerance for ADL and work activities.  4. Pt to report be conscious of impaired sitting and standing posture daily to decrease pain   5. Pt to tolerate HEP to improve ROM and independence with ADL's    Long Term Goals: 10 weeks  1.Report decreased in pain at worse less than  <   / =  2  /10  to increase tolerance for functional mobility  2.  Patient will demonstrate improved overall function per FOTO Survey to CJ = at least 20% but < 40% impaired, limited or restricted score or less.  3.Increased L shoulder ER/IR MMT 1 grade to increase tolerance for ADL and work activities.  4. Pt to report and demonstrate proper posture in standing and sitting to decrease pain  5. Pt to be Independent with HEP to improve ROM and independence with ADL's.  6. Pt to improve L shoulder flexion range of motion by 20' to allow for improved functional mobility to allow for improvement in IADLs.        Plan of care Certification: 4/12/2019 to 7/12/2019.    Outpatient Physical Therapy 1 times weekly for 10 weeks to include the following interventions: Gait Training, Manual Therapy, Patient Education, Self Care, Therapeutic Activites and Therapeutic Exercise.     Jose Hillirad, PT

## 2019-04-16 ENCOUNTER — TELEPHONE (OUTPATIENT)
Dept: PAIN MEDICINE | Facility: CLINIC | Age: 59
End: 2019-04-16

## 2019-04-16 NOTE — TELEPHONE ENCOUNTER
Informed wife that the clinical educator Dr. Austin requested be present for the procedure is unavailable on 4/24/19.  Procedure rescheduled to 5/1/19- Edmundo Post with [facility] notified of new date, as well as surgery schedulers.

## 2019-05-01 ENCOUNTER — HOSPITAL ENCOUNTER (OUTPATIENT)
Dept: RADIOLOGY | Facility: HOSPITAL | Age: 59
Discharge: HOME | End: 2019-05-01
Attending: PAIN MEDICINE | Admitting: PAIN MEDICINE
Payer: COMMERCIAL

## 2019-05-01 ENCOUNTER — HOSPITAL ENCOUNTER (OUTPATIENT)
Facility: HOSPITAL | Age: 59
Discharge: HOME | End: 2019-05-01
Attending: PAIN MEDICINE | Admitting: PAIN MEDICINE
Payer: COMMERCIAL

## 2019-05-01 VITALS
SYSTOLIC BLOOD PRESSURE: 127 MMHG | TEMPERATURE: 98 F | RESPIRATION RATE: 18 BRPM | OXYGEN SATURATION: 97 % | HEART RATE: 77 BPM | DIASTOLIC BLOOD PRESSURE: 77 MMHG

## 2019-05-01 DIAGNOSIS — M25.512 LEFT SHOULDER PAIN: ICD-10-CM

## 2019-05-01 DIAGNOSIS — M19.012 GLENOHUMERAL ARTHRITIS, LEFT: ICD-10-CM

## 2019-05-01 DIAGNOSIS — M25.512 CHRONIC LEFT SHOULDER PAIN: Primary | ICD-10-CM

## 2019-05-01 DIAGNOSIS — G89.29 CHRONIC LEFT SHOULDER PAIN: Primary | ICD-10-CM

## 2019-05-01 PROCEDURE — 64418 NJX AA&/STRD SPRSCAP NRV: CPT

## 2019-05-01 PROCEDURE — 64450 PR NERVE BLOCK INJ, ANES/STEROID, OTHER PERIPHERAL: ICD-10-PCS | Mod: 59,LT,, | Performed by: PAIN MEDICINE

## 2019-05-01 PROCEDURE — 64450 NJX AA&/STRD OTHER PN/BRANCH: CPT | Performed by: PAIN MEDICINE

## 2019-05-01 PROCEDURE — 76000 FLUOROSCOPY <1 HR PHYS/QHP: CPT | Mod: TC

## 2019-05-01 PROCEDURE — 64415 NJX AA&/STRD BRCH PLXS IMG: CPT | Mod: LT,,, | Performed by: PAIN MEDICINE

## 2019-05-01 PROCEDURE — 25000003 PHARM REV CODE 250

## 2019-05-01 PROCEDURE — 64415 PR NERVE BLOCK INJ, ANES/STEROID, BRACHIAL PLEXUS, INCL IMAG GUIDANCE: ICD-10-PCS | Mod: LT,,, | Performed by: PAIN MEDICINE

## 2019-05-01 PROCEDURE — 64450 NJX AA&/STRD OTHER PN/BRANCH: CPT | Mod: 59,LT,, | Performed by: PAIN MEDICINE

## 2019-05-01 PROCEDURE — 64417 NJX AA&/STRD AX NERVE IMG: CPT

## 2019-05-01 RX ORDER — LIDOCAINE HYDROCHLORIDE 10 MG/ML
1 INJECTION, SOLUTION EPIDURAL; INFILTRATION; INTRACAUDAL; PERINEURAL ONCE
Status: COMPLETED | OUTPATIENT
Start: 2019-05-01 | End: 2019-05-01

## 2019-05-01 RX ORDER — LIDOCAINE HYDROCHLORIDE 20 MG/ML
INJECTION, SOLUTION INFILTRATION; PERINEURAL
Status: COMPLETED
Start: 2019-05-01 | End: 2019-05-01

## 2019-05-01 RX ORDER — LIDOCAINE HYDROCHLORIDE 10 MG/ML
INJECTION, SOLUTION EPIDURAL; INFILTRATION; INTRACAUDAL; PERINEURAL
Status: COMPLETED
Start: 2019-05-01 | End: 2019-05-01

## 2019-05-01 RX ORDER — LIDOCAINE HYDROCHLORIDE 20 MG/ML
10 INJECTION, SOLUTION INFILTRATION; PERINEURAL ONCE
Status: COMPLETED | OUTPATIENT
Start: 2019-05-01 | End: 2019-05-01

## 2019-05-01 RX ADMIN — LIDOCAINE HYDROCHLORIDE 5 ML: 10 INJECTION, SOLUTION EPIDURAL; INFILTRATION; INTRACAUDAL; PERINEURAL at 09:05

## 2019-05-01 RX ADMIN — SODIUM BICARBONATE 1 ML: 0.2 INJECTION, SOLUTION INTRAVENOUS at 09:05

## 2019-05-01 RX ADMIN — LIDOCAINE HYDROCHLORIDE 9 ML: 20 INJECTION, SOLUTION INFILTRATION; PERINEURAL at 09:05

## 2019-05-01 NOTE — INTERVAL H&P NOTE
The patient has been examined and the H&P has been reviewed:    I concur with the findings and no changes have occurred since H&P was written.    Anesthesia/Surgery risks, benefits and alternative options discussed and understood by patient/family.          Active Hospital Problems    Diagnosis  POA    Left shoulder pain [M25.512]  Yes      Resolved Hospital Problems   No resolved problems to display.

## 2019-05-01 NOTE — OP NOTE
Suprascapular, axillary and lateral pectoral articular branch block    Time-out taken to identify patient and procedure side prior to starting the procedure.   I attest that I have reviewed the patient's home medications prior to the procedure and no contraindication have been identified. I re-evaluated the patient after the patient was positioned for the procedure in the procedure room immediately before the procedural time-out. The vital signs are current and represent the current state of the patient which has not significantly changed since the preprocedure assessment.          Date of Service: 05/01/2019    PCP: Yolanda Marques MD    Referring Physician:                                                           PROCEDURE:  Left suprascapular, axillary and lateral pectoral articular branch block    REASON FOR PROCEDURE: Glenohumeral arthritis, left [M19.012]  Chronic left shoulder pain [M25.512, G89.29]    PHYSICIAN: Dameon Austin MD  ASSISTANTS: None    MEDICATIONS INJECTED:  preservative free Xylocaine 1%  1.0 ml at each level.    LOCAL ANESTHETIC USED:   Xylocaine 2% 9ml with Sodium Bicarbonate 1ml.  3ml per site.    SEDATION MEDICATIONS: None    ESTIMATED BLOOD LOSS:  None.    COMPLICATIONS:  None.    TECHNIQUE:   Laying in prone position, the patient was prepped and draped in the usual sterile fashion using ChloraPrep and fenestrated drape.  The Left glenohumeral joint was determined under fluoroscopic guidance.  Local anesthetic was given by going down to the hub of the 27-gauge 1.25in needle and raising a wheel.  A 22-gauge 3.5inch needle was introduced to the anatomic local of the target sites for the suprascapular and axillary nerves utilizing live fluoroscopy. Medication was then injected slowly.The patient was then repositioned in the supine position and was  prepped and draped in the usual sterile fashion using ChloraPrep and fenestrated drape. The Left coracoid process was visualized under  fluoroscopic guidance. A 22-gauge 3.5inch needle was introduced to the anatomic local of the target site for the lateral pectoralis nerve utilizing live fluoroscopy. The medication was then injected slowly after negative aspiration. The patient tolerated the procedure well.         The patient was monitored after the procedure.  Patient was given post procedure and discharge instructions to follow at home.  We will see the patient back in two weeks or the patient may call to inform of status. The patient was discharged in a stable condition

## 2019-05-01 NOTE — DISCHARGE SUMMARY
Discharge Diagnosis:Glenohumeral arthritis, left [M19.012]  Chronic left shoulder pain [M25.512, G89.29]  Condition on Discharge: Stable.  Diet on Discharge: Same as before.  Activity: as per instruction sheet.  Discharge to: Home with a responsible adult.  Follow up: as per Discharge instructions

## 2019-05-01 NOTE — DISCHARGE INSTRUCTIONS

## 2019-05-02 ENCOUNTER — TELEPHONE (OUTPATIENT)
Dept: PAIN MEDICINE | Facility: CLINIC | Age: 59
End: 2019-05-02

## 2019-05-02 DIAGNOSIS — M25.512 CHRONIC LEFT SHOULDER PAIN: Primary | ICD-10-CM

## 2019-05-02 DIAGNOSIS — G89.29 CHRONIC LEFT SHOULDER PAIN: Primary | ICD-10-CM

## 2019-05-02 NOTE — TELEPHONE ENCOUNTER
Patient reports >75% reduction in pain following the nerve block performed yesterday, with also significant improvement with mobility.  Mr. Blas would like to proceed with the RFA as discussed- MD updated.      Reviewed the pre-procedure instructions listed below with him- copy will also be sent via [place].  Instructed patient to notify clinic if he begins feeling ill, runs a fever, is prescribed antibiotics, and/or has any outpatient procedures within the two weeks leading up to this procedure.  Instructed patient to report to [facility], 2nd Floor Endoscopy Registration Desk.  All questions answered- patient verbalized understanding.     Day of Procedure  - Ensure you have obtained an arrival time from the Pain Management Staff  o Procedure Area will call 1-3 days in advance with your arrival time.  Please check any voicemails received.  o If you arrive past your scheduled procedure time, you may be asked to reschedule your procedure.  - Ensure you have a  with you to remain present throughout your procedure for your safety.  o If you arrive without a responsible adult to stay with you and drive you home, you may be asked to reschedule your procedure.  - Take all of your prescribed medications (exceptions noted below) with a small amount of water  - This IS a fasting procedure: Please do not eat for six (6) hours before your scheduled arrival time.  o You may have clear liquids for up to three (3) hours before your arrival time.    - Clear liquids include: water, fruit juices without pulp, clear tea, and black coffee (no sugar and/or creamer/milk)  - Wear comfortable clothing (loose fitting pants).  - You may wear glasses, dentures, contact lenses, and/or hearing aids. Please remove all jewelry and metal hairpins.  - Notify the nurse during the intake process if you are allergic to any medications, if you are diabetic, or if you are not feeling well  - Contact the Pain Management Clinic  with the following:  o A fever greater than 100° (degrees)   o Feel ill, have any type of infection, or are taking antibiotics now or within the two (2) weeks leading up to this procedure  o Have any outpatient procedures within the two (2) weeks leading up to this procedure (colonoscopy, major dental work, etc.)    IF you are taking blood thinners: Only upon receiving clearance and notification from the Pain Management Department  7 Days Prior to Your Procedure:  - Stop taking Plavix/Clopridogrel, Effient/Prasugel, ASA  5 Days Prior to Your Procedure:  - Stop taking Coumadin/Warfarin.  An INR may be drawn prior to your procedure.  If labs are required, you will need to arrive earlier than your scheduled arrival time.  - Stop taking Pradaxa/Dabigatra,  Brilinta/ Ticagrelor  3 Days Prior to Your Procedure:  - Stop taking Xarelto/Rivaroxaban, Eliquis/Apixaban, Aggrenox/Dipyridamole, Reopro/Abciximab

## 2019-05-02 NOTE — TELEPHONE ENCOUNTER
Informed patient that I did speak with his wife.  He can contact the clinic with any additional information or questions.

## 2019-05-02 NOTE — TELEPHONE ENCOUNTER
----- Message from Mickie Santana sent at 5/2/2019 10:35 AM CDT -----  Contact: Self/  [phone]  Type: Patient Call Back    Who called:  Patient    What is the request in detail:  Patient returned staffs call, would like a call back.  Thank you    Would the patient rather a call back or a response via [facility]?   Call Back    Best call back number:  [phone]

## 2019-05-16 ENCOUNTER — HOSPITAL ENCOUNTER (OUTPATIENT)
Facility: HOSPITAL | Age: 59
Discharge: HOME | End: 2019-05-16
Attending: PAIN MEDICINE | Admitting: PAIN MEDICINE
Payer: COMMERCIAL

## 2019-05-16 VITALS
OXYGEN SATURATION: 98 % | TEMPERATURE: 98 F | RESPIRATION RATE: 20 BRPM | HEIGHT: 73 IN | SYSTOLIC BLOOD PRESSURE: 142 MMHG | HEART RATE: 67 BPM | WEIGHT: 307 LBS | DIASTOLIC BLOOD PRESSURE: 79 MMHG | BODY MASS INDEX: 40.69 KG/M2

## 2019-05-16 DIAGNOSIS — G89.29 CHRONIC LEFT SHOULDER PAIN: Primary | ICD-10-CM

## 2019-05-16 DIAGNOSIS — M25.512 CHRONIC LEFT SHOULDER PAIN: Primary | ICD-10-CM

## 2019-05-16 DIAGNOSIS — M25.512 LEFT SHOULDER PAIN: ICD-10-CM

## 2019-05-16 PROCEDURE — 64640 PR DESTRUCT BY NEURO AGENT; OTHER PERIPH NERVE: ICD-10-PCS | Mod: LT,,, | Performed by: PAIN MEDICINE

## 2019-05-16 PROCEDURE — 64640 INJECTION TREATMENT OF NERVE: CPT | Mod: LT | Performed by: PAIN MEDICINE

## 2019-05-16 PROCEDURE — 64999 PR PULSED RFA  SUPRASCAPULAR NERVE: ICD-10-PCS | Mod: LT,,, | Performed by: PAIN MEDICINE

## 2019-05-16 PROCEDURE — 63600175 PHARM REV CODE 636 W HCPCS: Performed by: PAIN MEDICINE

## 2019-05-16 PROCEDURE — 99152 PR MOD CONSCIOUS SEDATION, SAME PHYS, 5+ YRS, FIRST 15 MIN: ICD-10-PCS | Mod: ,,, | Performed by: PAIN MEDICINE

## 2019-05-16 PROCEDURE — 64640 INJECTION TREATMENT OF NERVE: CPT | Mod: LT,,, | Performed by: PAIN MEDICINE

## 2019-05-16 PROCEDURE — 25000003 PHARM REV CODE 250: Performed by: PAIN MEDICINE

## 2019-05-16 PROCEDURE — 64999 UNLISTED PX NERVOUS SYSTEM: CPT | Mod: LT,,, | Performed by: PAIN MEDICINE

## 2019-05-16 PROCEDURE — 99152 MOD SED SAME PHYS/QHP 5/>YRS: CPT | Mod: ,,, | Performed by: PAIN MEDICINE

## 2019-05-16 RX ORDER — LIDOCAINE HYDROCHLORIDE 20 MG/ML
10 INJECTION, SOLUTION INFILTRATION; PERINEURAL ONCE
Status: COMPLETED | OUTPATIENT
Start: 2019-05-16 | End: 2019-05-16

## 2019-05-16 RX ORDER — SODIUM CHLORIDE 9 MG/ML
INJECTION, SOLUTION INTRAVENOUS CONTINUOUS
Status: DISCONTINUED | OUTPATIENT
Start: 2019-05-16 | End: 2019-05-16 | Stop reason: HOSPADM

## 2019-05-16 RX ORDER — MIDAZOLAM HYDROCHLORIDE 1 MG/ML
5 INJECTION INTRAMUSCULAR; INTRAVENOUS
Status: DISCONTINUED | OUTPATIENT
Start: 2019-05-16 | End: 2019-05-16 | Stop reason: HOSPADM

## 2019-05-16 RX ORDER — FENTANYL CITRATE 50 UG/ML
100 INJECTION, SOLUTION INTRAMUSCULAR; INTRAVENOUS
Status: DISCONTINUED | OUTPATIENT
Start: 2019-05-16 | End: 2019-05-16 | Stop reason: HOSPADM

## 2019-05-16 RX ORDER — TRIAMCINOLONE ACETONIDE 40 MG/ML
INJECTION, SUSPENSION INTRA-ARTICULAR; INTRAMUSCULAR
Status: DISCONTINUED
Start: 2019-05-16 | End: 2019-05-16 | Stop reason: HOSPADM

## 2019-05-16 RX ORDER — BUPIVACAINE HYDROCHLORIDE 2.5 MG/ML
10 INJECTION, SOLUTION EPIDURAL; INFILTRATION; INTRACAUDAL ONCE
Status: COMPLETED | OUTPATIENT
Start: 2019-05-16 | End: 2019-05-16

## 2019-05-16 RX ORDER — TRIAMCINOLONE ACETONIDE 40 MG/ML
40 INJECTION, SUSPENSION INTRA-ARTICULAR; INTRAMUSCULAR ONCE
Status: COMPLETED | OUTPATIENT
Start: 2019-05-16 | End: 2019-05-16

## 2019-05-16 RX ORDER — MIDAZOLAM HYDROCHLORIDE 1 MG/ML
INJECTION INTRAMUSCULAR; INTRAVENOUS
Status: DISCONTINUED
Start: 2019-05-16 | End: 2019-05-16 | Stop reason: HOSPADM

## 2019-05-16 RX ORDER — LIDOCAINE HYDROCHLORIDE 20 MG/ML
INJECTION, SOLUTION INFILTRATION; PERINEURAL
Status: DISCONTINUED
Start: 2019-05-16 | End: 2019-05-16 | Stop reason: HOSPADM

## 2019-05-16 RX ORDER — BUPIVACAINE HYDROCHLORIDE 2.5 MG/ML
INJECTION, SOLUTION EPIDURAL; INFILTRATION; INTRACAUDAL
Status: DISCONTINUED
Start: 2019-05-16 | End: 2019-05-16 | Stop reason: HOSPADM

## 2019-05-16 RX ORDER — FENTANYL CITRATE 50 UG/ML
INJECTION, SOLUTION INTRAMUSCULAR; INTRAVENOUS
Status: DISCONTINUED
Start: 2019-05-16 | End: 2019-05-16 | Stop reason: HOSPADM

## 2019-05-16 RX ADMIN — SODIUM CHLORIDE: 0.9 INJECTION, SOLUTION INTRAVENOUS at 11:05

## 2019-05-16 NOTE — OR NURSING
Procedure completed. Discharge instruction given. Understanding verbalized. Moving all extremities without difficulties. Patient ready for discharge.

## 2019-05-16 NOTE — H&P
Subjective:     Patient ID: Srinivasan Munoz is a 59 y.o. male    Chief Complaint: Left shoulder pain      Referred by: Dameon Austin Jr*      HPI:      Srinivasan Munoz is a 59 y.o. male who presents today for RFA of left axillary, suprascapular and lateral pectoral articular branches. He denies any changes in the quality or location of his pain since last encounter.   This pain is described in detail below.        Review of Systems   Constitutional: Negative for activity change, appetite change, chills, fatigue, fever and unexpected weight change.   HENT: Negative for hearing loss.    Eyes: Negative for visual disturbance.   Respiratory: Negative for chest tightness and shortness of breath.    Cardiovascular: Negative for chest pain.   Gastrointestinal: Negative for abdominal pain, constipation, diarrhea, nausea and vomiting.   Genitourinary: Negative for difficulty urinating.   Musculoskeletal: Positive for arthralgias, myalgias and neck pain. Negative for back pain and gait problem.   Skin: Negative for rash.   Neurological: Negative for dizziness, weakness, light-headedness, numbness and headaches.   Psychiatric/Behavioral: Positive for sleep disturbance. Negative for hallucinations and suicidal ideas. The patient is not nervous/anxious.        Past Medical History:   Diagnosis Date    Arthritis     Eye injury 20 yrs ago    nail stuck in od     Fractured bone     right foot    Nuclear sclerosis of left eye 6/25/2018    Retinal detachment 20 yrs ago     od        Past Surgical History:   Procedure Laterality Date    Axillary, Suprascapular, and Lateral Pectoral Nerve Blocks Left 5/1/2019    [facility] by [facility]on Austin [facility]O    CATARACT EXTRACTION Left 06/25/2018    Dr. Pa    CATARACT EXTRACTION W/  INTRAOCULAR LENS IMPLANT Right 1999    Dr. Landry    EYE SURGERY      INSERTION-INTRAOCULAR LENS (IOL) Left 6/25/2018    [facility]    BHUPENDRA  Bilateral 2000    PHACOEMULSIFICATION-ASPIRATION-CATARACT Left 6/25/2018    [facility]    RETINAL DETACHMENT SURGERY Right 1999    Dr. Landry       Social History     Socioeconomic History    Marital status:      Spouse name: Not on file    Number of children: Not on file    Years of education: Not on file    Highest education level: Not on file   Occupational History    Not on file   Social Needs    Financial resource strain: Not on file    Food insecurity:     Worry: Not on file     Inability: Not on file    Transportation needs:     Medical: Not on file     Non-medical: Not on file   Tobacco Use    Smoking status: Current Every Day Smoker     Packs/day: 1.50     Years: 42.00     Pack years: 63.00     Types: Cigarettes    Smokeless tobacco: Never Used   Substance and Sexual Activity    Alcohol use: No    Drug use: No    Sexual activity: Yes     Partners: Female   Lifestyle    Physical activity:     Days per week: Not on file     Minutes per session: Not on file    Stress: Not on file   Relationships    Social connections:     Talks on phone: Not on file     Gets together: Not on file     Attends Latter day service: Not on file     Active member of club or organization: Not on file     Attends meetings of clubs or organizations: Not on file     Relationship status: Not on file   Other Topics Concern    Not on file   Social History Narrative    Not on file       Review of patient's allergies indicates:   Allergen Reactions    Penicillins Hives       No current facility-administered medications on file prior to encounter.      Current Outpatient Medications on File Prior to Encounter   Medication Sig Dispense Refill    gabapentin (NEURONTIN) 800 MG tablet Take 1 tablet (800 mg total) by mouth 2 (two) times daily. 180 tablet 1    meloxicam (MOBIC) 15 MG tablet Take 1 tablet (15 mg total) by mouth once daily. Do not take with naprosyn / other nsaids 90 tablet 1  "   HYDROcodone-acetaminophen (NORCO) 7.5-325 mg per tablet Take 1 tablet by mouth every 24 hours as needed for Pain. 30 tablet 0       Objective:      /65 (BP Location: Left arm, Patient Position: Lying)   Pulse 67   Temp 98.1 °F (36.7 °C) (Oral)   Resp 18   Ht 6' 1" (1.854 m)   Wt (!) 139.3 kg (307 lb)   SpO2 95%   BMI 40.50 kg/m²     Exam:  AAOx3 NAD  Mood and affect normal  Memory and language intact  RRR  CTAB        Assessment:       Chronic left shoulder pain      Plan:         Srinivasan Munoz is a 59 y.o. male with chronic left shoulder pain..    Proceed with RFA as planned.     "

## 2019-05-16 NOTE — OP NOTE
Date: 05/16/2019     Procedure:Cooled Radiofreqiency Ablation:  Articular branches of the left suprascapular, axillary and left pectoral nerves.     Pre-op diagnosis:  Chronic left shoulder pain   Post-op diagnosis:  Chronic left shoulder pain     Physician: Dr. Dameon Austin      Assistant: none     Anesthestia: local/IV sedation:  Versed 2 mg and fentanyl 75 mcg IV.  Conscious sedation provided by MD and monitored by RN.   (See nurse documentation and case log for sedation time)     All medications, allergies, and relevant histories were reviewed. No recent antibiotics or infections.  A time-out was taken to verify the correct patient, procedure, laterality, and appropriate medications/allergies.        The procedure risks, benefits, and possible complications were discussed with the patient including nerve damage, infection, spinal headache, and paresis. NIBP, pulse rate, and O2 per nasal cannula at 3L/min. were monitored throughout the procedure.       Patient was placed in the prone position. Skin was prepped with CHG and draped.  The left glenohumeral joint was visualized under fluoroscopy. Entry sites were marked over the skin and Xylocaine 2% was used to anesthetize the skin and subcutaneous tissues. A 17-gauge 75 mm  Cooled RF needle with 2 mm active tip was introduced coaxially, and the needle was placed target sites for the axillary suprascapular articular branches.  Placement was confirmed with a fluoroscopic view.       No motor stimulation was elicited at any level at 2V with a frequency of 2Hz. After negative aspiration, 1cc of 2% lidocaine was injected at each level. Thermal RF was then conducted at each level at 80 degrees for 2:30 minutes. 1.0 cc of a mixture of 0.25 % bupivacaine and 20 mg Kenalog was injected at each level.     The patient was then repositioned into the supine position. Skin was prepped with CHG and draped.  The left coracoid process was visualized under fluoroscopy. Entry  sites were marked over the skin and Xylocaine 2% was used to anesthetize the skin and subcutaneous tissues. A 17-gauge 75 mm  Cooled RF needle with 2 mm active tip was introduced coaxially, and the needle was placed target sites for the left pectoral nerve articular branches.  Placement was confirmed with a fluoroscopic view    No motor stimulation was elicited at any level at 2V with a frequency of 2Hz. After negative aspiration, 1cc of 2% lidocaine was injected at each level. Thermal RF was then conducted at each level at 80 degrees for 2:30 minutes. 1.0 cc of a mixture of 0.25 % bupivacaine and 20 mg Kenalog was injected at each level.       Patient tolerated the procedure well and there were no complications.     Future Management:   If helpful, can repeat as needed.

## 2019-05-16 NOTE — DISCHARGE INSTRUCTIONS
Medial Branch Neurotomy  Back or neck pain may be due to problems with certain nerves near your spine. If so, a medial branch neurotomy can help relieve your pain.  In some cases, your doctor may give you a nerve block to predict your response to neurotomy. The treatment uses heat, cold, radiofrequency, or chemicals to destroy the nerves near a problem joint. This keeps some pain messages from traveling to your brain, and helps relieve your symptoms.    Medial branch nerves  Each vertebra in your spine has facets (flat surfaces). They touch where the vertebrae fit together. This forms a facet joint. Each facet joint has at least two medial branch nerves. They are part of the nerve pathway to and from each facet joint. A facet joint in your back or neck can become inflamed (swollen and irritated). Pain messages may then travel along the nerve pathway from the facet joint to your brain.    Blocking pain messages  Medial branch nerves in each facet joint send and carry messages about back or neck pain. Destroying a few of these nerves can keep certain pain messages from reaching your brain. This can help bring you relief. The relief typically lasts for months to years.  Risks and complications  Risks and complications are rare, but can include:  · Infection  · Increased pain, numbness, or weakness  · Nerve damage  · Bleeding  · Failure to relieve pain    © 0427-7251 [employer]. [address] [place] [address], [place] [address]. All rights reserved. This information is not intended as a substitute for professional medical care. Always follow your healthcare professional's instructions.      Medial Branch Neurotomy: Your Experience  Back or neck pain may be due to problems with certain nerves near your spine. If so, a medial branch neurotomy can help relieve your pain. The treatment uses heat, cold, radiofrequency, or chemicals to destroy the nerves near a problem joint. This keeps some pain messages from  traveling to your brain, and helps relieve your symptoms.  The treatment is done in a hospital or outpatient department. Your healthcare provider will ask you to sign a consent form. He or she will examine you and may give you an IV (intravenous) line for fluids and medicines.      Relax at home for the rest of the day after your treatment, even if you feel good.    Getting ready for your treatment  · Ask your healthcare provider whether you should stop taking any medicines before treatment.  · Tell your provider if you are pregnant or allergic to any medicines.  · You may be asked to stop eating or drinking for several hours before you check in for your treatment.  During the procedure  You will lie on an exam table, most likely on your stomach depending on where the problem joint is.  · Your healthcare provider will clean the skin over the treatment site and then numb it with medicine.  · Your provider uses X-ray imaging (fluoroscopy) to help see your spine and guide the treatment. Your provider may inject a contrast dye into the affected region to help get a better image. If you are allergic to iodine or had a reaction to a dye, tell your healthcare provider.  · Your healthcare provider uses heat, cold, or chemicals to destroy part of the nerve near the inflamed facet joint. Nearby nerves may also be treated.  After the procedure  Most often, you can go home shortly after the procedure, generally in about an hour. Have an adult friend or relative drive you. The treated spot may be swollen and may feel more sore than usual. This is normal and may last for a day or so. It will be a few days before you feel relief from your symptoms. Your provider may prescribe pain medicines for you during that time. Ask him or her when its OK for you to go back to work.  Call your healthcare provider if you have a fever over 100.4°F (38°C), chills, or redness or drainage at the treatment site.    © 8925-5504 [employer],  [employer]. [address] [place] [address]. All rights reserved. This information is not intended as a substitute for professional medical care. Always follow your healthcare professional's instructions.    Home Care Instructions Pain Management:    1. DIET:   You may resume your normal diet today.   2. BATHING:   You may shower with luke warm water.  3. DRESSING:   You may remove your bandage today.   4. ACTIVITY LEVEL:   You may resume your normal activities 24 hrs after your procedure.  5. MEDICATIONS:   You may resume your normal medications today.   6. SPECIAL INSTRUCTIONS:   No heat to the injection site for 24 hrs including, bath or shower, heating pad, moist heat, or hot tubs.    Use ice pack to injection site for any pain or discomfort.  Apply ice packs for 20 minute intervals as needed.   If you have received any sedatives by mouth today you may not drive for 12 hours.    If you have received any sedation through your IV, you may not drive for 24 hrs.     PLEASE CALL YOUR DOCTOR IF:  1. Redness or swelling around the injection site.  2. Fever of 101 degrees  3. Drainage (pus) from the injection site.  4. For any continuous bleeding (some dried blood over the incision is normal.)    FOR EMERGENCIES:   If any unusual problems or difficulties occur during clinic hours, call [phone] or [phone].   Home Care Instructions Pain Management:    1. DIET:   You may resume your normal diet today.   2. BATHING:   You may shower with luke warm water.  3. DRESSING:   You may remove your bandage today.   4. ACTIVITY LEVEL:   You may resume your normal activities 24 hrs after your procedure.  5. MEDICATIONS:   You may resume your normal medications today.   6. SPECIAL INSTRUCTIONS:   No heat to the injection site for 24 hrs including, bath or shower, heating pad, moist heat, or hot tubs.    Use ice pack to injection site for any pain or discomfort.  Apply ice packs for 20 minute intervals as needed.   If you have  received any sedatives by mouth today you may not drive for 12 hours.    If you have received any sedation through your IV, you may not drive for 24 hrs.     PLEASE CALL YOUR DOCTOR IF:  1. Redness or swelling around the injection site.  2. Fever of 101 degrees  3. Drainage (pus) from the injection site.  4. For any continuous bleeding (some dried blood over the incision is normal.)    FOR EMERGENCIES:   If any unusual problems or difficulties occur during clinic hours, call [phone] or [phone].   Home Care Instructions Pain Management:    1. DIET:   You may resume your normal diet today.   2. BATHING:   You may shower with luke warm water.  3. DRESSING:   You may remove your bandage today.   4. ACTIVITY LEVEL:   You may resume your normal activities 24 hrs after your procedure.  5. MEDICATIONS:   You may resume your normal medications today.   6. SPECIAL INSTRUCTIONS:   No heat to the injection site for 24 hrs including, bath or shower, heating pad, moist heat, or hot tubs.    Use ice pack to injection site for any pain or discomfort.  Apply ice packs for 20 minute intervals as needed.   If you have received any sedatives by mouth today you may not drive for 12 hours.    If you have received any sedation through your IV, you may not drive for 24 hrs.     PLEASE CALL YOUR DOCTOR IF:  1. Redness or swelling around the injection site.  2. Fever of 101 degrees  3. Drainage (pus) from the injection site.  4. For any continuous bleeding (some dried blood over the incision is normal.)    FOR EMERGENCIES:   If any unusual problems or difficulties occur during clinic hours, call [phone] or [phone].   Fall Prevention  Millions of people fall every year and injure themselves. You may have had anesthesia or sedation which may increase your risk of falling. You may have health issues that put you at an increased risk of falling.     Here are ways to reduce your risk of falling.  ·   · Make your home safe by keeping walkways  clear of objects you may trip over.  · Use non-slip pads under rugs. Do not use area rugs or small throw rugs.  · Use non-slip mats in bathtubs and showers.  · Install handrails and lights on staircases.  · Do not walk in poorly lit areas.  · Do not stand on chairs or wobbly ladders.  · Use caution when reaching overhead or looking upward. This position can cause a loss of balance.  · Be sure your shoes fit properly, have non-slip bottoms and are in good condition.   · Wear shoes both inside and out. Avoid going barefoot or wearing slippers.  · Be cautious when going up and down stairs, curbs, and when walking on uneven sidewalks.  · If your balance is poor, consider using a cane or walker.  · If your fall was related to alcohol use, stop or limit alcohol intake.   · If your fall was related to use of sleeping medicines, talk to your doctor about this. You may need to reduce your dosage at bedtime if you awaken during the night to go to the bathroom.    · To reduce the need for nighttime bathroom trips:  ¨ Avoid drinking fluids for several hours before going to bed  ¨ Empty your bladder before going to bed  ¨ Men can keep a urinal at the bedside  · Stay as active as you can. Balance, flexibility, strength, and endurance all come from exercise. They all play a role in preventing falls. Ask your healthcare provider which types of activity are right for you.  · Get your vision checked on a regular basis.  · If you have pets, know where they are before you stand up or walk so you don't trip over them.  · Use night lights.

## 2019-05-23 ENCOUNTER — CLINICAL SUPPORT (OUTPATIENT)
Dept: REHABILITATION | Facility: HOSPITAL | Age: 59
End: 2019-05-23
Payer: COMMERCIAL

## 2019-05-23 DIAGNOSIS — R20.2 NUMBNESS AND TINGLING OF BOTH UPPER EXTREMITIES: ICD-10-CM

## 2019-05-23 DIAGNOSIS — Z12.11 ENCOUNTER FOR FIT (FECAL IMMUNOCHEMICAL TEST) SCREENING: Primary | ICD-10-CM

## 2019-05-23 DIAGNOSIS — M25.512 ACUTE PAIN OF LEFT SHOULDER: ICD-10-CM

## 2019-05-23 DIAGNOSIS — M43.6 NECK STIFFNESS: ICD-10-CM

## 2019-05-23 DIAGNOSIS — M54.2 CERVICAL PAIN: ICD-10-CM

## 2019-05-23 DIAGNOSIS — R20.0 NUMBNESS AND TINGLING OF BOTH UPPER EXTREMITIES: ICD-10-CM

## 2019-05-23 PROCEDURE — 97110 THERAPEUTIC EXERCISES: CPT | Mod: PN

## 2019-05-23 NOTE — PROGRESS NOTES
"  Physical Therapy Daily Treatment Note     Name: Srinivasan Munoz  Clinic Number: [id number]    Therapy Diagnosis:   Encounter Diagnoses   Name Primary?    Acute pain of left shoulder     Cervical pain     Neck stiffness     Numbness and tingling of both upper extremities      Physician: Mary Westbrook PA-C    Visit Date: 5/23/2019    Physician Orders: PT Eval and Treat   Medical Diagnosis from Referral: Chronic pain in left shoulder; Chronic pain of right heel;  Evaluation Date: 4/12/2019  Authorization Period Expiration: 12/31/2019  Plan of Care Expiration: 7/12/2019  Visit # / Visits authorized: 1/ 20     Time In: 1135  Time Out: 1125  Total Billable Time: 30 minutes     Precautions: Standard    Subjective     Pt reports: Pt reports that he had gotten a nerve ablation procedure done in his neck which have reduced his numbness and tingling completely and have reduced his pain about 90%.  He was compliant with home exercise program.  Response to previous treatment: Fair  Functional change: Difficulty to determine d/t procedure which helped pt pain    Pain: 0/10  Location: bilateral neck  And shoulders    Objective     Wan received therapeutic exercises to develop strength, endurance, ROM and posture for 45 minutes including:  Pulleys x3' fwd/3" lateral    -Cervical extension isometric with two pillows under head 20x2"holds  -Supine serratus punches no weight 2x20 BUE  -Door pec stretch 3x30"  -Rows with RTB cues to relax B UT 2x20  -BUE extensions  -Wall crawls with RUE 2x10  -Bodyblade 2x30" M<>L, 2x30" A<>P BUE  -No moneys 2x15 YTB  -alternating isometrics to B shoulder flexed to 90' 2x1' each  Add IR/ER with band next visit    Wan received the following manual therapy techniques: Joint mobilizations, Manual traction, Myofacial release, Soft tissue Mobilization and Friction Massage were applied to the: neck and shoudler for 00 minutes, including:    Pt declined use of heat/ice    Home Exercises " Provided and Patient Education Provided     Education provided:   - Pt educated on proper posture and positioning  - Pt educated on proper form and technique with exercises    Written Home Exercises Provided: Patient instructed to cont prior HEP.  Exercises were reviewed and Wan was able to demonstrate them prior to the end of the session.  Wan demonstrated good  understanding of the education provided.     See EMR under Patient Instructions for exercises provided prior visit.    Assessment   Pt tolerated session very well with significant improvement in pain and activity tolerance this visit as compared with evaluation. Pt demonstrates upper trap and rhomboid dominance with exercises and requires cues to correct. Pt will benefit from continued cervical and shoulder strengthening.     Wan is progressing well towards his goals.   Pt prognosis is Good.     Pt will continue to benefit from skilled outpatient physical therapy to address the deficits listed in the problem list box on initial evaluation, provide pt/family education and to maximize pt's level of independence in the home and community environment.     Pt's spiritual, cultural and educational needs considered and pt agreeable to plan of care and goals.     Anticipated barriers to physical therapy: None    GOALS: Short Term Goals: 5 weeks  1.Report decreased in pain at worse less than  <   / =  4  /10  to increase tolerance for functional mobility.  2. Pt to improve L shoulder flexion range of motion by 10' to allow for improved functional mobility to allow for improvement in IADLs.   3. Increased L shoulder IR/ER MMT 1/2 grade to increase tolerance for ADL and work activities.  4. Pt to report be conscious of impaired sitting and standing posture daily to decrease pain   5. Pt to tolerate HEP to improve ROM and independence with ADL's     Long Term Goals: 10 weeks  1.Report decreased in pain at worse less than  <   / =  2  /10  to increase  tolerance for functional mobility  2.  Patient will demonstrate improved overall function per FOTO Survey to CJ = at least 20% but < 40% impaired, limited or restricted score or less.  3.Increased L shoulder ER/IR MMT 1 grade to increase tolerance for ADL and work activities.  4. Pt to report and demonstrate proper posture in standing and sitting to decrease pain  5. Pt to be Independent with HEP to improve ROM and independence with ADL's.  6. Pt to improve L shoulder flexion range of motion by 20' to allow for improved functional mobility to allow for improvement in IADLs.      Plan     Continue per POC    Jose Hilliard, PT

## 2019-06-03 ENCOUNTER — OFFICE VISIT (OUTPATIENT)
Dept: PAIN MEDICINE | Facility: CLINIC | Age: 59
End: 2019-06-03
Payer: COMMERCIAL

## 2019-06-03 VITALS
DIASTOLIC BLOOD PRESSURE: 70 MMHG | SYSTOLIC BLOOD PRESSURE: 136 MMHG | WEIGHT: 315 LBS | OXYGEN SATURATION: 98 % | HEIGHT: 73 IN | RESPIRATION RATE: 18 BRPM | HEART RATE: 77 BPM | BODY MASS INDEX: 41.75 KG/M2

## 2019-06-03 DIAGNOSIS — M19.012 GLENOHUMERAL ARTHRITIS, LEFT: ICD-10-CM

## 2019-06-03 DIAGNOSIS — G89.29 CHRONIC LEFT SHOULDER PAIN: Primary | ICD-10-CM

## 2019-06-03 DIAGNOSIS — M25.512 CHRONIC LEFT SHOULDER PAIN: Primary | ICD-10-CM

## 2019-06-03 PROCEDURE — 99999 PR PBB SHADOW E&M-EST. PATIENT-LVL III: ICD-10-PCS | Mod: PBBFAC,,, | Performed by: PAIN MEDICINE

## 2019-06-03 PROCEDURE — 99999 PR PBB SHADOW E&M-EST. PATIENT-LVL III: CPT | Mod: PBBFAC,,, | Performed by: PAIN MEDICINE

## 2019-06-03 PROCEDURE — 3008F PR BODY MASS INDEX (BMI) DOCUMENTED: ICD-10-PCS | Mod: CPTII,S$GLB,, | Performed by: PAIN MEDICINE

## 2019-06-03 PROCEDURE — 99213 PR OFFICE/OUTPT VISIT, EST, LEVL III, 20-29 MIN: ICD-10-PCS | Mod: S$GLB,,, | Performed by: PAIN MEDICINE

## 2019-06-03 PROCEDURE — 3008F BODY MASS INDEX DOCD: CPT | Mod: CPTII,S$GLB,, | Performed by: PAIN MEDICINE

## 2019-06-03 PROCEDURE — 99213 OFFICE O/P EST LOW 20 MIN: CPT | Mod: S$GLB,,, | Performed by: PAIN MEDICINE

## 2019-06-03 NOTE — PROGRESS NOTES
Subjective:     Patient ID: Srinivasan Munoz is a 59 y.o. male    Chief Complaint: Follow-up (Nerve RFA 05.16)      Referred by: No ref. provider found      HPI:    Interval History (6/3/19):  He returns today for follow up.  He reports that left suprascapular, axillary and lateral pectoral articular branch radiofrequency ablation has been helpful for the left shoulder pain.  He reports 100% relief of his shoulder pain. He also reports near complete relief of his left-sided neck pain. He is very happy with these results.  He does have some intermittent right knee pain and right ankle pain but does not feel as though these need to be addressed at this time.      Initial Encounter (4/11/19):  Srinivasan Munoz is a 59 y.o. male who presents today with multiple pain complaints.  These include chronic left shoulder pain, chronic neck pain and chronic right heel pain. Primary amongst these is chronic left shoulder pain. This pain has been present for years.  Patient has failed injections, therapy and medications. He has been treated by orthopedic surgery in the past and was told that he needs a shoulder replacement.  The shoulder pain is located primarily in the posterior left shoulder.  The pain is worse with overhead activities.  This pain is limiting his ability to perform his job.  He also has chronic neck pain that radiates the bilateral upper extremities with associated numbness.  A nerve conduction study/EMG and cervical MRI have been performed but I do not have records of these at this time.  This pain is described in detail below.    Physical Therapy:  Yes.  Not effective    Non-pharmacologic Treatment:  Rest helps         · TENS?  No    Pain Medications:         · Currently taking:  Gabapentin, meloxicam    · Has tried in the past:  Tylenol, Norco    · Has not tried:  Muscle relaxants, TCAs, SNRIs, topical creams    Blood thinners:  None    Interventional Therapies:  Cervical epidural steroid injection done  by Dr. Barnett, this helped with his neck pain for short period time. Did not improve his shoulder pain at all.  5/16/19 - left suprascapular, axillary and lateral pectoral articular branch radiofrequency ablation    Relevant Surgeries:  None    Affecting sleep?  Yes    Affecting daily activities? yes    Depressive symptoms? no          · SI/HI? No    Work status: Employed    Pain Scores:    Best:       0/10  Worst:     1/10  Usually:   0/10  Today:    0/10    Review of Systems   Constitutional: Negative for activity change, appetite change, chills, fatigue, fever and unexpected weight change.   HENT: Negative for hearing loss.    Eyes: Negative for visual disturbance.   Respiratory: Negative for chest tightness and shortness of breath.    Cardiovascular: Negative for chest pain.   Gastrointestinal: Negative for abdominal pain, constipation, diarrhea, nausea and vomiting.   Genitourinary: Negative for difficulty urinating.   Musculoskeletal: Positive for arthralgias, myalgias, neck pain and neck stiffness. Negative for back pain and gait problem.   Skin: Negative for rash.   Neurological: Positive for numbness. Negative for dizziness, weakness, light-headedness and headaches.   Psychiatric/Behavioral: Positive for sleep disturbance. Negative for hallucinations and suicidal ideas. The patient is not nervous/anxious.        Past Medical History:   Diagnosis Date    Arthritis     Eye injury 20 yrs ago    nail stuck in od     Fractured bone     right foot    Nuclear sclerosis of left eye 6/25/2018    Retinal detachment 20 yrs ago     od        Past Surgical History:   Procedure Laterality Date    Axillary, Suprascapular, and Lateral Pectoral Nerve Blocks Left 5/1/2019    [facility]    CATARACT EXTRACTION Left 06/25/2018    Dr. Pa    CATARACT EXTRACTION W/  INTRAOCULAR LENS IMPLANT Right 1999    Dr. Landry    EYE SURGERY      INSERTION-INTRAOCULAR LENS (IOL) Left  6/25/2018    [facility]    LASIK Bilateral 2000    Nerve Radiofrequency Thermocoagulation Left 5/16/2019    [facility]    PHACOEMULSIFICATION-ASPIRATION-CATARACT Left 6/25/2018    [facility]    RETINAL DETACHMENT SURGERY Right 1999    Dr. Landry       Social History     Socioeconomic History    Marital status:      Spouse name: Not on file    Number of children: Not on file    Years of education: Not on file    Highest education level: Not on file   Occupational History    Not on file   Social Needs    Financial resource strain: Not on file    Food insecurity:     Worry: Not on file     Inability: Not on file    Transportation needs:     Medical: Not on file     Non-medical: Not on file   Tobacco Use    Smoking status: Current Every Day Smoker     Packs/day: 1.50     Years: 42.00     Pack years: 63.00     Types: Cigarettes    Smokeless tobacco: Never Used   Substance and Sexual Activity    Alcohol use: No    Drug use: No    Sexual activity: Yes     Partners: Female   Lifestyle    Physical activity:     Days per week: Not on file     Minutes per session: Not on file    Stress: Not on file   Relationships    Social connections:     Talks on phone: Not on file     Gets together: Not on file     Attends Yazidism service: Not on file     Active member of club or organization: Not on file     Attends meetings of clubs or organizations: Not on file     Relationship status: Not on file   Other Topics Concern    Not on file   Social History Narrative    Not on file       Review of patient's allergies indicates:   Allergen Reactions    Penicillins Hives       Current Outpatient Medications on File Prior to Visit   Medication Sig Dispense Refill    gabapentin (NEURONTIN) 800 MG tablet Take 1 tablet (800 mg total) by mouth 2 (two) times daily. 180 tablet 1    meloxicam (MOBIC) 15 MG tablet Take  "1 tablet (15 mg total) by mouth once daily. Do not take with naprosyn / other nsaids 90 tablet 1    HYDROcodone-acetaminophen (NORCO) 7.5-325 mg per tablet Take 1 tablet by mouth every 24 hours as needed for Pain. 30 tablet 0     No current facility-administered medications on file prior to visit.        Objective:      /70   Pulse 77   Resp 18   Ht 6' 1" (1.854 m)   Wt (!) 144.2 kg (318 lb)   SpO2 98%   BMI 41.96 kg/m²     Exam:  GEN:  Well developed, well nourished.  No acute distress.   HEENT:  No trauma.  Mucous membranes moist.  Nares patent bilaterally.  PSYCH: Normal affect. Thought content appropriate.  CHEST:  Breathing symmetric.  No audible wheezing.  ABD: Soft, non-distended.  SKIN:  Warm, pink, dry.  No rash on exposed areas.    EXT:  No cyanosis, clubbing, or edema.  No color change or changes in nail or hair growth.  NEURO/MUSCULOSKELETAL:  Fully alert, oriented, and appropriate. Speech normal dior. No cranial nerve deficits.   Gait:  Normal.  No focal motor deficits.     Full active range of motion of bilateral shoulders without pain            Imaging:  No pertinent imaging available at this time    Assessment:       Encounter Diagnoses   Name Primary?    Chronic left shoulder pain Yes    Glenohumeral arthritis, left          Plan:       Srinivasan was seen today for follow-up.    Diagnoses and all orders for this visit:    Chronic left shoulder pain    Glenohumeral arthritis, left        Srinivasan Munoz is a 59 y.o. male with chronic left shoulder pain.  Likely from glenohumeral arthritis.  Complete resolution of pain following left shoulder radiofrequency ablation.  Neck pain has also improved.    1.  No further interventions at this time.  Patient doing very well following radiofrequency ablation.  2.  Return to clinic as needed.  May consider repeat radiofrequency ablation in the future if needed may also consider right knee injection the future if needed.  "

## 2019-07-26 ENCOUNTER — CLINICAL SUPPORT (OUTPATIENT)
Dept: SMOKING CESSATION | Facility: CLINIC | Age: 59
End: 2019-07-26
Payer: COMMERCIAL

## 2019-07-26 DIAGNOSIS — F17.200 NICOTINE DEPENDENCE: Primary | ICD-10-CM

## 2019-07-26 PROCEDURE — 99999 PR PBB SHADOW E&M-EST. PATIENT-LVL I: ICD-10-PCS | Mod: PBBFAC,,,

## 2019-07-26 PROCEDURE — 99404 PREV MED CNSL INDIV APPRX 60: CPT | Mod: S$GLB,,,

## 2019-07-26 PROCEDURE — 99999 PR PBB SHADOW E&M-EST. PATIENT-LVL I: CPT | Mod: PBBFAC,,,

## 2019-07-26 PROCEDURE — 99404 PR PREVENT COUNSEL,INDIV,60 MIN: ICD-10-PCS | Mod: S$GLB,,,

## 2019-07-26 RX ORDER — IBUPROFEN 200 MG
2 TABLET ORAL DAILY
Qty: 28 PATCH | Refills: 0 | Status: SHIPPED | OUTPATIENT
Start: 2019-07-26 | End: 2019-08-08

## 2019-07-26 RX ORDER — BUPROPION HYDROCHLORIDE 150 MG/1
150 TABLET, EXTENDED RELEASE ORAL 2 TIMES DAILY
Qty: 60 TABLET | Refills: 0 | Status: SHIPPED | OUTPATIENT
Start: 2019-07-26 | End: 2019-08-27

## 2019-07-26 RX ORDER — MICONAZOLE NITRATE 2 %
2 CREAM (GRAM) TOPICAL
Qty: 100 EACH | Refills: 0 | Status: SHIPPED | OUTPATIENT
Start: 2019-07-26 | End: 2019-08-08

## 2019-07-26 NOTE — Clinical Note
Pt presents for intake smoking 2 pks of cigarettes per day, after assessment and discussion recommend the nicotine patch 21mg doubled and oral nrt 2mg, recommend an abrupt quit, discussed strategies to help cut back and to help break the routine and habit associated with smoking, intake handout provided to the patient and discussed, all prescribed NRT discussed in depth as well as tobacco cessation medication s/e as well as usage discussed, contact card provided to the patient. Will continue to follow every two weeks while in the program.

## 2019-08-08 ENCOUNTER — CLINICAL SUPPORT (OUTPATIENT)
Dept: SMOKING CESSATION | Facility: CLINIC | Age: 59
End: 2019-08-08
Payer: COMMERCIAL

## 2019-08-08 DIAGNOSIS — F17.200 NICOTINE DEPENDENCE: ICD-10-CM

## 2019-08-08 PROCEDURE — 90853 PR GROUP PSYCHOTHERAPY: ICD-10-PCS | Mod: S$GLB,,,

## 2019-08-08 PROCEDURE — 90853 GROUP PSYCHOTHERAPY: CPT | Mod: S$GLB,,,

## 2019-08-08 RX ORDER — DIPHENHYDRAMINE HCL 25 MG
4 CAPSULE ORAL
Qty: 100 EACH | Refills: 0 | Status: SHIPPED | OUTPATIENT
Start: 2019-08-08 | End: 2019-09-08

## 2019-08-27 ENCOUNTER — CLINICAL SUPPORT (OUTPATIENT)
Dept: SMOKING CESSATION | Facility: CLINIC | Age: 59
End: 2019-08-27
Payer: COMMERCIAL

## 2019-08-27 DIAGNOSIS — F17.200 NICOTINE DEPENDENCE: Primary | ICD-10-CM

## 2019-08-27 PROCEDURE — 90853 PR GROUP PSYCHOTHERAPY: ICD-10-PCS | Mod: S$GLB,,,

## 2019-08-27 PROCEDURE — 90853 GROUP PSYCHOTHERAPY: CPT | Mod: S$GLB,,,

## 2019-08-27 PROCEDURE — 99999 PR PBB SHADOW E&M-EST. PATIENT-LVL I: ICD-10-PCS | Mod: PBBFAC,,,

## 2019-08-27 PROCEDURE — 99999 PR PBB SHADOW E&M-EST. PATIENT-LVL I: CPT | Mod: PBBFAC,,,

## 2019-08-27 RX ORDER — VARENICLINE TARTRATE 0.5 (11)-1
KIT ORAL
Qty: 1 PACKAGE | Refills: 0 | Status: SHIPPED | OUTPATIENT
Start: 2019-08-27 | End: 2020-03-10

## 2019-08-27 NOTE — Clinical Note
Patient's Response to Intervention: pt presents for group smoking a little more then he was 2 weeks ago, he is up to 1.25 pks but down from 2 packs per day, he is not wearing the nicotine patch and having some resistance with quitting however wants to continue to try, he is not getting any desired effects from the wellbutrin and would like to dc therefore recommend him dc the nicotine patches - they are not sticking and pt is not compliant with the patches, after discussion recommend chantix starter pk and to continue working on cutting back on his smoking, session handout provided and discussed. Will follow

## 2019-08-29 NOTE — PROGRESS NOTES
Smoking Cessation Group Session #2    Site: [facility]   Date:  8/29/2019  Clinical Status of Patient: Outpatient   Length of Service and Code: 90 minutes - [id number]   Number in Attendance: 2  Group Activities/Focus of Group:  Sharing last weeks challenges, triggers, and coping activities to remain quit and/ or keep making progress toward cessation, completion of TCRS (Tobacco Cessation Rating Scale) learned addiction model, personal reasons for quitting, medications, goals, quit date.    Specific session focus: Specific session focus: group # 2 Learning about health and tobacco, effects on the heart, respiratory systemother health concerns, changes to expect after you quit smoking cigarettes, nicotine withdrawl, why does quiting require more then just medicaito ??,how to achieve this goal, strategies to try,         Target symptoms:  withdrawal and medication side effects             The following were rated moderate (3) to severe (4) on TCRS:       Moderate 3: 0     Severe 4:   0  Patient's Response to Intervention: pt presents for group smoking a little more then he was 2 weeks ago, he is up to 1.25 pks but down from 2 packs per day, he is not wearing the nicotine patch and having some resistance with quitting however wants to continue to try, he is not getting any desired effects from the wellbutrin and would like to dc therefore recommend him dc the nicotine patches - they are not sticking and pt is not compliant with the patches, after discussion recommend chantix starter pk and to continue working on cutting back on his smoking, session handout provided and discussed. Will follow   Progress Toward Goals and Other Mental Status Changes: progressing towards goals with no mental status changes noted   Interval History: n/a    Diagnosis: Z72.0  Plan: The patient will continue with group therapy sessions and medication regimen prescribed with management by physician or by the Cessation Clinic  Provider. Patient will inform Smoking Cessation Counselor of symptoms as rated high on TCRS.    Return to Clinic: 2 weeks    Quit Date: n/a   Planned Quit Date: n/a

## 2019-09-09 ENCOUNTER — OFFICE VISIT (OUTPATIENT)
Dept: PAIN MEDICINE | Facility: CLINIC | Age: 59
End: 2019-09-09
Payer: COMMERCIAL

## 2019-09-09 ENCOUNTER — PATIENT OUTREACH (OUTPATIENT)
Dept: ADMINISTRATIVE | Facility: OTHER | Age: 59
End: 2019-09-09

## 2019-09-09 VITALS
DIASTOLIC BLOOD PRESSURE: 71 MMHG | HEIGHT: 73 IN | WEIGHT: 315 LBS | OXYGEN SATURATION: 97 % | HEART RATE: 71 BPM | BODY MASS INDEX: 41.75 KG/M2 | SYSTOLIC BLOOD PRESSURE: 136 MMHG

## 2019-09-09 DIAGNOSIS — R20.0 NUMBNESS AND TINGLING OF BOTH UPPER EXTREMITIES: ICD-10-CM

## 2019-09-09 DIAGNOSIS — M47.22 OSTEOARTHRITIS OF SPINE WITH RADICULOPATHY, CERVICAL REGION: ICD-10-CM

## 2019-09-09 DIAGNOSIS — G89.29 CHRONIC LEFT SHOULDER PAIN: ICD-10-CM

## 2019-09-09 DIAGNOSIS — R20.2 NUMBNESS AND TINGLING OF BOTH UPPER EXTREMITIES: ICD-10-CM

## 2019-09-09 DIAGNOSIS — M54.12 CERVICAL RADICULOPATHY: ICD-10-CM

## 2019-09-09 DIAGNOSIS — M19.012 GLENOHUMERAL ARTHRITIS, LEFT: ICD-10-CM

## 2019-09-09 DIAGNOSIS — M50.30 DDD (DEGENERATIVE DISC DISEASE), CERVICAL: Primary | ICD-10-CM

## 2019-09-09 DIAGNOSIS — M25.512 CHRONIC LEFT SHOULDER PAIN: ICD-10-CM

## 2019-09-09 PROCEDURE — 99999 PR PBB SHADOW E&M-EST. PATIENT-LVL III: ICD-10-PCS | Mod: PBBFAC,,, | Performed by: PAIN MEDICINE

## 2019-09-09 PROCEDURE — 99214 PR OFFICE/OUTPT VISIT, EST, LEVL IV, 30-39 MIN: ICD-10-PCS | Mod: S$GLB,,, | Performed by: PAIN MEDICINE

## 2019-09-09 PROCEDURE — 99214 OFFICE O/P EST MOD 30 MIN: CPT | Mod: S$GLB,,, | Performed by: PAIN MEDICINE

## 2019-09-09 PROCEDURE — 3008F PR BODY MASS INDEX (BMI) DOCUMENTED: ICD-10-PCS | Mod: CPTII,S$GLB,, | Performed by: PAIN MEDICINE

## 2019-09-09 PROCEDURE — 3008F BODY MASS INDEX DOCD: CPT | Mod: CPTII,S$GLB,, | Performed by: PAIN MEDICINE

## 2019-09-09 PROCEDURE — 99999 PR PBB SHADOW E&M-EST. PATIENT-LVL III: CPT | Mod: PBBFAC,,, | Performed by: PAIN MEDICINE

## 2019-09-09 RX ORDER — TIZANIDINE 2 MG/1
TABLET ORAL
COMMUNITY
End: 2020-03-10

## 2019-09-09 RX ORDER — PREDNISONE 20 MG/1
TABLET ORAL
COMMUNITY
End: 2020-01-23

## 2019-09-09 NOTE — PROGRESS NOTES
Subjective:     Patient ID: Srinivasan Munoz is a 59 y.o. male    Chief Complaint: Arm Pain      Referred by: Self, Aaareferral      HPI:    Interval History (9/10/19):  He returns today for follow up.  He reports that his left-sided neck and upper extremity pain have returned.  He reports having severe pain in the left cervical region that radiates all the way down to his hand.  He reports cramping of his left hand.  He denies any focal weakness or bowel bladder dysfunction.  He states that previous cervical epidural steroid injection was helpful for this problem.  Previous EMG did show evidence of mild left carpal tunnel syndrome as well as left cubital tunnel syndrome.  Previous cervical MRI did show evidence of cervical degenerative disc disease with spinal and foraminal stenosis.      Interval History (6/3/19):  He returns today for follow up.  He reports that left suprascapular, axillary and lateral pectoral articular branch radiofrequency ablation has been helpful for the left shoulder pain.  He reports 100% relief of his shoulder pain. He also reports near complete relief of his left-sided neck pain. He is very happy with these results.  He does have some intermittent right knee pain and right ankle pain but does not feel as though these need to be addressed at this time.      Initial Encounter (4/11/19):  Srinivasan Munoz is a 59 y.o. male who presents today with multiple pain complaints.  These include chronic left shoulder pain, chronic neck pain and chronic right heel pain. Primary amongst these is chronic left shoulder pain. This pain has been present for years.  Patient has failed injections, therapy and medications. He has been treated by orthopedic surgery in the past and was told that he needs a shoulder replacement.  The shoulder pain is located primarily in the posterior left shoulder.  The pain is worse with overhead activities.  This pain is limiting his ability to perform his job.  He also  has chronic neck pain that radiates the bilateral upper extremities with associated numbness.  A nerve conduction study/EMG and cervical MRI have been performed but I do not have records of these at this time.  This pain is described in detail below.    Physical Therapy:  Yes.  Not effective    Non-pharmacologic Treatment:  Rest helps         · TENS?  No    Pain Medications:         · Currently taking:  Gabapentin 800 b.i.d., meloxicam    · Has tried in the past:  Tylenol, Norco    · Has not tried:  Muscle relaxants, TCAs, SNRIs, topical creams    Blood thinners:  None    Interventional Therapies:    Cervical epidural steroid injection done by Dr. Barnett, this helped with his neck pain for short period time. Did not improve his shoulder pain at all.  5/16/19 - left suprascapular, axillary and lateral pectoral articular branch radiofrequency ablation    Relevant Surgeries:  None    Affecting sleep?  Yes    Affecting daily activities? yes    Depressive symptoms? no          · SI/HI? No    Work status: Employed    Pain Scores:    Best:       0/10  Worst:     1/10  Usually:   0/10  Today:    7/10    Review of Systems   Constitutional: Negative for activity change, appetite change, chills, fatigue, fever and unexpected weight change.   HENT: Negative for hearing loss.    Eyes: Negative for visual disturbance.   Respiratory: Negative for chest tightness and shortness of breath.    Cardiovascular: Negative for chest pain.   Gastrointestinal: Negative for abdominal pain, constipation, diarrhea, nausea and vomiting.   Genitourinary: Negative for difficulty urinating.   Musculoskeletal: Positive for arthralgias, myalgias, neck pain and neck stiffness. Negative for back pain and gait problem.   Skin: Negative for rash.   Neurological: Positive for numbness. Negative for dizziness, weakness, light-headedness and headaches.   Psychiatric/Behavioral: Positive for sleep disturbance. Negative for hallucinations and suicidal ideas.  The patient is not nervous/anxious.        Past Medical History:   Diagnosis Date    Arthritis     Eye injury 20 yrs ago    nail stuck in od     Fractured bone     right foot    Nuclear sclerosis of left eye 6/25/2018    Retinal detachment 20 yrs ago     od        Past Surgical History:   Procedure Laterality Date    Axillary, Suprascapular, and Lateral Pectoral Nerve Blocks Left 5/1/2019    [facility] Dameon Austin [facility] ENDO    CATARACT EXTRACTION Left 06/25/2018    Dr. Pa    CATARACT EXTRACTION W/  INTRAOCULAR LENS IMPLANT Right 1999    Dr. Landry    EYE SURGERY      INSERTION-INTRAOCULAR LENS (IOL) Left 6/25/2018    [facility]    LASIK Bilateral 2000    Nerve Radiofrequency Thermocoagulation Left 5/16/2019    Performed [facility] Dame[facility] Austin Jr[facility] ENDO    PHACOEMULSIFICATION-ASPIRATION-CATARACT Left 6/25/2018    [facility]    RETINAL DETACHMENT SURGERY Right 1999    Dr. Landry       Social History     Socioeconomic History    Marital status:      Spouse name: Not on file    Number of children: Not on file    Years of education: Not on file    Highest education level: Not on file   Occupational History    Not on file   Social Needs    Financial resource strain: Not on file    Food insecurity:     Worry: Not on file     Inability: Not on file    Transportation needs:     Medical: Not on file     Non-medical: Not on file   Tobacco Use    Smoking status: Current Every Day Smoker     Packs/day: 1.50     Years: 42.00     Pack years: 63.00     Types: Cigarettes    Smokeless tobacco: Never Used   Substance and Sexual Activity    Alcohol use: No    Drug use: No    Sexual activity: Yes     Partners: Female   Lifestyle    Physical activity:     Days per week: Not on file     Minutes per session: Not on file    Stress: Not on file   Relationships    Social connections:     Talks on phone: Not on file  "    Gets together: Not on file     Attends [employer] service: Not on file     Active member of club or organization: Not on file     Attends meetings of clubs or organizations: Not on file     Relationship status: Not on file   Other Topics Concern    Not on file   Social History Narrative    Not on file       Review of patient's allergies indicates:   Allergen Reactions    Penicillins Hives       Current Outpatient Medications on File Prior to Visit   Medication Sig Dispense Refill    DIPH,PERTUSS,ACEL,,TET VAC,PF, ADULT (ADACEL,TDAP ADOLESN/ADULT,,PF,) 2 Lf-(2.5-5-3-5 mcg)-5Lf/0.5 mL Syrg Adacel (Tdap Adolesn/Adult)(PF)2 Lf-(2.5-5-3-5)-5 Lf/0.5 mL IM syringe      gabapentin (NEURONTIN) 800 MG tablet Take 1 tablet (800 mg total) by mouth 2 (two) times daily. 180 tablet 1    HYDROcodone-acetaminophen (NORCO) 7.5-325 mg per tablet Take 1 tablet by mouth every 24 hours as needed for Pain. 30 tablet 0    meloxicam (MOBIC) 15 MG tablet Take 1 tablet (15 mg total) by mouth once daily. Do not take with naprosyn / other nsaids 90 tablet 1    predniSONE (DELTASONE) 20 MG tablet prednisone 20 mg tablet      tiZANidine (ZANAFLEX) 2 MG tablet tizanidine 2 mg tablet      varenicline (CHANTIX STARTING MONTH BOX) 0.5 mg (11)- 1 mg (42) tablet Take one 0.5mg tab by mouth once daily X3 days,then increase to one 0.5mg tab twice daily X4 days,then increase to one 1mg tab twice daily 1 Package 0     No current facility-administered medications on file prior to visit.        Objective:      /71   Pulse 71   Ht 6' 1" (1.854 m)   Wt (!) 144.7 kg (319 lb 1.6 oz)   SpO2 97%   BMI 42.10 kg/m²     Exam:  GEN:  Well developed, well nourished.  No acute distress.   HEENT:  No trauma.  Mucous membranes moist.  Nares patent bilaterally.  PSYCH: Normal affect. Thought content appropriate.  CHEST:  Breathing symmetric.  No audible wheezing.  ABD: Soft, non-distended.  SKIN:  Warm, pink, dry.  No rash on exposed areas.    EXT:  " No cyanosis, clubbing, or edema.  No color change or changes in nail or hair growth.  NEURO/MUSCULOSKELETAL:  Fully alert, oriented, and appropriate. Speech normal dior. No cranial nerve deficits.   Gait:  Normal.  No focal motor deficits.     Full active range of motion of bilateral shoulders without pain            Imaging:  No pertinent imaging available at this time    Assessment:       Encounter Diagnoses   Name Primary?    DDD (degenerative disc disease), cervical Yes    Cervical radiculopathy     Osteoarthritis of spine with radiculopathy, cervical region     Chronic left shoulder pain     Glenohumeral arthritis, left     Numbness and tingling of both upper extremities          Plan:       Srinivasan was seen today for arm pain.    Diagnoses and all orders for this visit:    DDD (degenerative disc disease), cervical    Cervical radiculopathy    Osteoarthritis of spine with radiculopathy, cervical region    Chronic left shoulder pain    Glenohumeral arthritis, left    Numbness and tingling of both upper extremities        Srinivasan Munoz is a 59 y.o. male with chronic left shoulder pain.  Likely from glenohumeral arthritis.  Complete resolution of pain following left shoulder radiofrequency ablation.  Now having recurrence left-sided neck and upper extremity pain. Exact etiology is unclear at this time. Multiple diagnoses on the differential.  May be due to recurrence cervical radicular pain.  May also be related to left carpal tunnel or cubital tunnel pain.    1.  I would like to directly review his previous cervical MRI prior to considering any further interventions.  Patient was advised to obtain a copy of the MRI disc and bring it to clinic for further review.  2.  I will likely order bilateral upper extremity EMG/nerve conduction study to evaluate for peripheral nerve entrapment/radiculopathy.  3.  Return to clinic with MRI disc.  At that time we will review imaging and likely order EMG.  May  consider cervical epidural steroid injection if appropriate.  4.  In the meantime, I advised patient to begin taking gabapentin 800 mg t.i.d..  Patient can call for refills of this medication if he needs to.

## 2019-09-10 ENCOUNTER — CLINICAL SUPPORT (OUTPATIENT)
Dept: SMOKING CESSATION | Facility: CLINIC | Age: 59
End: 2019-09-10
Payer: COMMERCIAL

## 2019-09-10 DIAGNOSIS — F17.200 NICOTINE DEPENDENCE: Primary | ICD-10-CM

## 2019-09-10 PROCEDURE — 99407 BEHAV CHNG SMOKING > 10 MIN: CPT | Mod: S$GLB,,,

## 2019-09-10 PROCEDURE — 99407 PR TOBACCO USE CESSATION INTENSIVE >10 MINUTES: ICD-10-PCS | Mod: S$GLB,,,

## 2019-09-15 ENCOUNTER — PATIENT OUTREACH (OUTPATIENT)
Dept: ADMINISTRATIVE | Facility: OTHER | Age: 59
End: 2019-09-15

## 2019-09-17 ENCOUNTER — PATIENT MESSAGE (OUTPATIENT)
Dept: PAIN MEDICINE | Facility: CLINIC | Age: 59
End: 2019-09-17

## 2019-09-17 ENCOUNTER — OFFICE VISIT (OUTPATIENT)
Dept: PAIN MEDICINE | Facility: CLINIC | Age: 59
End: 2019-09-17
Payer: COMMERCIAL

## 2019-09-17 VITALS
OXYGEN SATURATION: 97 % | HEIGHT: 73 IN | SYSTOLIC BLOOD PRESSURE: 146 MMHG | HEART RATE: 64 BPM | DIASTOLIC BLOOD PRESSURE: 81 MMHG | WEIGHT: 315 LBS | BODY MASS INDEX: 41.75 KG/M2

## 2019-09-17 DIAGNOSIS — M50.30 DDD (DEGENERATIVE DISC DISEASE), CERVICAL: Primary | ICD-10-CM

## 2019-09-17 DIAGNOSIS — M47.22 OSTEOARTHRITIS OF SPINE WITH RADICULOPATHY, CERVICAL REGION: ICD-10-CM

## 2019-09-17 DIAGNOSIS — M54.12 CERVICAL RADICULOPATHY: ICD-10-CM

## 2019-09-17 DIAGNOSIS — R20.0 NUMBNESS AND TINGLING OF BOTH UPPER EXTREMITIES: ICD-10-CM

## 2019-09-17 DIAGNOSIS — R20.2 NUMBNESS AND TINGLING OF BOTH UPPER EXTREMITIES: ICD-10-CM

## 2019-09-17 PROCEDURE — 99213 PR OFFICE/OUTPT VISIT, EST, LEVL III, 20-29 MIN: ICD-10-PCS | Mod: S$GLB,,, | Performed by: PAIN MEDICINE

## 2019-09-17 PROCEDURE — 3008F PR BODY MASS INDEX (BMI) DOCUMENTED: ICD-10-PCS | Mod: CPTII,S$GLB,, | Performed by: PAIN MEDICINE

## 2019-09-17 PROCEDURE — 99999 PR PBB SHADOW E&M-EST. PATIENT-LVL III: CPT | Mod: PBBFAC,,, | Performed by: PAIN MEDICINE

## 2019-09-17 PROCEDURE — 99213 OFFICE O/P EST LOW 20 MIN: CPT | Mod: S$GLB,,, | Performed by: PAIN MEDICINE

## 2019-09-17 PROCEDURE — 3008F BODY MASS INDEX DOCD: CPT | Mod: CPTII,S$GLB,, | Performed by: PAIN MEDICINE

## 2019-09-17 PROCEDURE — 99999 PR PBB SHADOW E&M-EST. PATIENT-LVL III: ICD-10-PCS | Mod: PBBFAC,,, | Performed by: PAIN MEDICINE

## 2019-09-17 NOTE — PROGRESS NOTES
Subjective:     Patient ID: Srinivasan Munoz is a 59 y.o. male    Chief Complaint: Follow-up      Referred by: No ref. provider found      HPI:    Interval History (9/17/19):  He returns today for follow up and MRI review.  He reports that his pain is unchanged in quality location since last encounter.  He denies any new or worsening symptoms.  He does not feel as though increase gabapentin dosage has been helpful.        Interval History (9/10/19):  He returns today for follow up.  He reports that his left-sided neck and upper extremity pain have returned.  He reports having severe pain in the left cervical region that radiates all the way down to his hand.  He reports cramping of his left hand.  He denies any focal weakness or bowel bladder dysfunction.  He states that previous cervical epidural steroid injection was helpful for this problem.  Previous EMG did show evidence of mild left carpal tunnel syndrome as well as left cubital tunnel syndrome.  Previous cervical MRI did show evidence of cervical degenerative disc disease with spinal and foraminal stenosis.      Interval History (6/3/19):  He returns today for follow up.  He reports that left suprascapular, axillary and lateral pectoral articular branch radiofrequency ablation has been helpful for the left shoulder pain.  He reports 100% relief of his shoulder pain. He also reports near complete relief of his left-sided neck pain. He is very happy with these results.  He does have some intermittent right knee pain and right ankle pain but does not feel as though these need to be addressed at this time.      Initial Encounter (4/11/19):  Srinivasan Munoz is a 59 y.o. male who presents today with multiple pain complaints.  These include chronic left shoulder pain, chronic neck pain and chronic right heel pain. Primary amongst these is chronic left shoulder pain. This pain has been present for years.  Patient has failed injections, therapy and medications.  He has been treated by orthopedic surgery in the past and was told that he needs a shoulder replacement.  The shoulder pain is located primarily in the posterior left shoulder.  The pain is worse with overhead activities.  This pain is limiting his ability to perform his job.  He also has chronic neck pain that radiates the bilateral upper extremities with associated numbness.  A nerve conduction study/EMG and cervical MRI have been performed but I do not have records of these at this time.  This pain is described in detail below.    Physical Therapy:  Yes.  Not effective    Non-pharmacologic Treatment:  Rest helps         · TENS?  No    Pain Medications:         · Currently taking:  Gabapentin 800 t.i.d., meloxicam    · Has tried in the past:  Tylenol, Norco, Aleve    · Has not tried:  Muscle relaxants, TCAs, SNRIs, topical creams    Blood thinners:  None    Interventional Therapies:    Cervical epidural steroid injection done by Dr. Barnett, this helped with his neck pain for short period time. Did not improve his shoulder pain at all.  5/16/19 - left suprascapular, axillary and lateral pectoral articular branch radiofrequency ablation    Relevant Surgeries:  None    Affecting sleep?  Yes    Affecting daily activities? yes    Depressive symptoms? no          · SI/HI? No    Work status: Employed    Pain Scores:    Best:       0/10  Worst:     1/10  Usually:   0/10  Today:    8/10    Review of Systems   Constitutional: Negative for activity change, appetite change, chills, fatigue, fever and unexpected weight change.   HENT: Negative for hearing loss.    Eyes: Negative for visual disturbance.   Respiratory: Negative for chest tightness and shortness of breath.    Cardiovascular: Negative for chest pain.   Gastrointestinal: Negative for abdominal pain, constipation, diarrhea, nausea and vomiting.   Genitourinary: Negative for difficulty urinating.   Musculoskeletal: Positive for arthralgias, myalgias, neck pain and neck  stiffness. Negative for back pain and gait problem.   Skin: Negative for rash.   Neurological: Positive for numbness. Negative for dizziness, weakness, light-headedness and headaches.   Psychiatric/Behavioral: Positive for sleep disturbance. Negative for hallucinations and suicidal ideas. The patient is not nervous/anxious.        Past Medical History:   Diagnosis Date    Arthritis     Eye injury 20 yrs ago    nail stuck in od     Fractured bone     right foot    Nuclear sclerosis of left eye 6/25/2018    Retinal detachment 20 yrs ago     od        Past Surgical History:   Procedure Laterality Date    Axillary, Suprascapular, and Lateral Pectoral Nerve Blocks Left 5/1/2019    Performed by Dameon Austin Jr[facility]    CATARACT EXTRACTION Left 06/25/2018    Dr. Pa    CATARACT EXTRACTION W/  INTRAOCULAR LENS IMPLANT Right 1999    Dr. Landry    EYE SURGERY      INSERTION-INTRAOCULAR LENS (IOL) Left 6/25/2018    [facility]    LASIK Bilateral 2000    Nerve Radiofrequency Thermocoagulation Left 5/16/2019    [facility] Dameon Austin Jr[facility] END[facility]    PHACOEMULSIFICATION-ASPIRATION-CATARACT Left 6/25/2018    [facility]    RETINAL DETACHMENT SURGERY Right 1999    Dr. Landry       Social History     Socioeconomic History    Marital status:      Spouse name: Not on file    Number of children: Not on file    Years of education: Not on file    Highest education level: Not on file   Occupational History    Not on file   Social Needs    Financial resource strain: Not on file    Food insecurity:     Worry: Not on file     Inability: Not on file    Transportation needs:     Medical: Not on file     Non-medical: Not on file   Tobacco Use    Smoking status: Current Every Day Smoker     Packs/day: 1.50     Years: 42.00     Pack years: 63.00     Types: Cigarettes    Smokeless tobacco: Never Used   Substance and Sexual  "Activity    Alcohol use: No    Drug use: No    Sexual activity: Yes     Partners: Female   Lifestyle    Physical activity:     Days per week: Not on file     Minutes per session: Not on file    Stress: Not on file   Relationships    Social connections:     Talks on phone: Not on file     Gets together: Not on file     Attends [employer] service: Not on file     Active member of club or organization: Not on file     Attends meetings of clubs or organizations: Not on file     Relationship status: Not on file   Other Topics Concern    Not on file   Social History Narrative    Not on file       Review of patient's allergies indicates:   Allergen Reactions    Penicillins Hives       Current Outpatient Medications on File Prior to Visit   Medication Sig Dispense Refill    DIPH,PERTUSS,ACEL,,TET VAC,PF, ADULT (ADACEL,TDAP ADOLESN/ADULT,,PF,) 2 Lf-(2.5-5-3-5 mcg)-5Lf/0.5 mL Syrg Adacel (Tdap Adolesn/Adult)(PF)2 Lf-(2.5-5-3-5)-5 Lf/0.5 mL IM syringe      gabapentin (NEURONTIN) 800 MG tablet Take 1 tablet (800 mg total) by mouth 2 (two) times daily. 180 tablet 1    HYDROcodone-acetaminophen (NORCO) 7.5-325 mg per tablet Take 1 tablet by mouth every 24 hours as needed for Pain. 30 tablet 0    meloxicam (MOBIC) 15 MG tablet Take 1 tablet (15 mg total) by mouth once daily. Do not take with naprosyn / other nsaids 90 tablet 1    predniSONE (DELTASONE) 20 MG tablet prednisone 20 mg tablet      tiZANidine (ZANAFLEX) 2 MG tablet tizanidine 2 mg tablet      varenicline (CHANTIX STARTING MONTH BOX) 0.5 mg (11)- 1 mg (42) tablet Take one 0.5mg tab by mouth once daily X3 days,then increase to one 0.5mg tab twice daily X4 days,then increase to one 1mg tab twice daily 1 Package 0     No current facility-administered medications on file prior to visit.        Objective:      BP (!) 146/81   Pulse 64   Ht 6' 1" (1.854 m)   Wt (!) 145.8 kg (321 lb 6.4 oz)   SpO2 97%   BMI 42.40 kg/m²     Exam:  GEN:  Well developed, well " nourished.  No acute distress.   HEENT:  No trauma.  Mucous membranes moist.  Nares patent bilaterally.  PSYCH: Normal affect. Thought content appropriate.  CHEST:  Breathing symmetric.  No audible wheezing.  ABD: Soft, non-distended.  SKIN:  Warm, pink, dry.  No rash on exposed areas.    EXT:  No cyanosis, clubbing, or edema.  No color change or changes in nail or hair growth.  NEURO/MUSCULOSKELETAL:  Fully alert, oriented, and appropriate. Speech normal dior. No cranial nerve deficits.   Gait:  Normal.  No focal motor deficits.     Full active range of motion of bilateral shoulders without pain            Imaging:  Outside cervical MRI from August of 2018:    At C5-6 there is a mild to moderate broad-based disc bulge with superimposed central disc extrusion that extends superior and inferior to the disc space.  There is associated deformation of spinal cord.  There is moderate right neural foraminal narrowing and moderate to severe left neural foraminal narrowing.    At C6-C7, there is a mild broad-based disc osteophyte complex that effaces the thecal sac.  There is mild-to-moderate right neural foraminal narrowing and moderate to severe left neural foraminal narrowing    At C3-C4 there is a minimal broad-based disc osteophyte complex, partially effacing the ventral thecal sac.  There is mild right neural foraminal narrowing and moderate to severe left neural foraminal narrowing      Assessment:       Encounter Diagnoses   Name Primary?    DDD (degenerative disc disease), cervical Yes    Cervical radiculopathy     Osteoarthritis of spine with radiculopathy, cervical region     Numbness and tingling of both upper extremities          Plan:       Srinivasan was seen today for follow-up.    Diagnoses and all orders for this visit:    DDD (degenerative disc disease), cervical    Cervical radiculopathy    Osteoarthritis of spine with radiculopathy, cervical region    Numbness and tingling of both upper  extremities        Srinivasan Munoz is a 59 y.o. male with chronic left shoulder pain.  Likely from glenohumeral arthritis.  Complete resolution of pain following left shoulder radiofrequency ablation.  Now having recurrent left-sided neck and upper extremity pain. Exact etiology is unclear at this time. Multiple diagnoses on the differential.  May be due to recurrence cervical radicular pain.  May also be related to left carpal tunnel or cubital tunnel pain. Cervical MRI reviewed today.  Significant evidence of multilevel left-sided foraminal stenosis.    1.  Pertinent imaging studies reviewed by me. Imaging results were discussed with patient.  2.  Schedule for C7-T1 interlaminar epidural steroid injection.  3.  Return to clinic 2 weeks after procedure to discuss results.  May consider EMG to investigate for peripheral nerve entrapment if symptoms persist.

## 2019-09-17 NOTE — H&P (VIEW-ONLY)
Subjective:     Patient ID: Srinivasan Munoz is a 59 y.o. male    Chief Complaint: Follow-up      Referred by: No ref. provider found      HPI:    Interval History (9/17/19):  He returns today for follow up and MRI review.  He reports that his pain is unchanged in quality location since last encounter.  He denies any new or worsening symptoms.  He does not feel as though increase gabapentin dosage has been helpful.        Interval History (9/10/19):  He returns today for follow up.  He reports that his left-sided neck and upper extremity pain have returned.  He reports having severe pain in the left cervical region that radiates all the way down to his hand.  He reports cramping of his left hand.  He denies any focal weakness or bowel bladder dysfunction.  He states that previous cervical epidural steroid injection was helpful for this problem.  Previous EMG did show evidence of mild left carpal tunnel syndrome as well as left cubital tunnel syndrome.  Previous cervical MRI did show evidence of cervical degenerative disc disease with spinal and foraminal stenosis.      Interval History (6/3/19):  He returns today for follow up.  He reports that left suprascapular, axillary and lateral pectoral articular branch radiofrequency ablation has been helpful for the left shoulder pain.  He reports 100% relief of his shoulder pain. He also reports near complete relief of his left-sided neck pain. He is very happy with these results.  He does have some intermittent right knee pain and right ankle pain but does not feel as though these need to be addressed at this time.      Initial Encounter (4/11/19):  Srinivasan Munoz is a 59 y.o. male who presents today with multiple pain complaints.  These include chronic left shoulder pain, chronic neck pain and chronic right heel pain. Primary amongst these is chronic left shoulder pain. This pain has been present for years.  Patient has failed injections, therapy and medications.  He has been treated by orthopedic surgery in the past and was told that he needs a shoulder replacement.  The shoulder pain is located primarily in the posterior left shoulder.  The pain is worse with overhead activities.  This pain is limiting his ability to perform his job.  He also has chronic neck pain that radiates the bilateral upper extremities with associated numbness.  A nerve conduction study/EMG and cervical MRI have been performed but I do not have records of these at this time.  This pain is described in detail below.    Physical Therapy:  Yes.  Not effective    Non-pharmacologic Treatment:  Rest helps         · TENS?  No    Pain Medications:         · Currently taking:  Gabapentin 800 t.i.d., meloxicam    · Has tried in the past:  Tylenol, Norco, Aleve    · Has not tried:  Muscle relaxants, TCAs, SNRIs, topical creams    Blood thinners:  None    Interventional Therapies:    Cervical epidural steroid injection done by Dr. Barnett, this helped with his neck pain for short period time. Did not improve his shoulder pain at all.  5/16/19 - left suprascapular, axillary and lateral pectoral articular branch radiofrequency ablation    Relevant Surgeries:  None    Affecting sleep?  Yes    Affecting daily activities? yes    Depressive symptoms? no          · SI/HI? No    Work status: Employed    Pain Scores:    Best:       0/10  Worst:     1/10  Usually:   0/10  Today:    8/10    Review of Systems   Constitutional: Negative for activity change, appetite change, chills, fatigue, fever and unexpected weight change.   HENT: Negative for hearing loss.    Eyes: Negative for visual disturbance.   Respiratory: Negative for chest tightness and shortness of breath.    Cardiovascular: Negative for chest pain.   Gastrointestinal: Negative for abdominal pain, constipation, diarrhea, nausea and vomiting.   Genitourinary: Negative for difficulty urinating.   Musculoskeletal: Positive for arthralgias, myalgias, neck pain and neck  stiffness. Negative for back pain and gait problem.   Skin: Negative for rash.   Neurological: Positive for numbness. Negative for dizziness, weakness, light-headedness and headaches.   Psychiatric/Behavioral: Positive for sleep disturbance. Negative for hallucinations and suicidal ideas. The patient is not nervous/anxious.        Past Medical History:   Diagnosis Date    Arthritis     Eye injury 20 yrs ago    nail stuck in od     Fractured bone     right foot    Nuclear sclerosis of left eye 6/25/2018    Retinal detachment 20 yrs ago     od        Past Surgical History:   Procedure Laterality Date    Axillary, Suprascapular, and Lateral Pectoral Nerve Blocks Left 5/1/2019    [facility]    CATARACT EXTRACTION Left 06/25/2018    Dr. Pa    CATARACT EXTRACTION W/  INTRAOCULAR LENS IMPLANT Right 1999    Dr. Landry    EYE SURGERY      INSERTION-INTRAOCULAR LENS (IOL) Left 6/25/2018    [facility]    LASIK Bilateral 2000    Nerve Radiofrequency Thermocoagulation Left 5/16/2019    [facility] ENDO    PHACOEMULSIFICATION-ASPIRATION-CATARACT Left 6/25/2018    [facility]    RETINAL DETACHMENT SURGERY Right 1999    Dr. Landry       Social History     Socioeconomic History    Marital status:      Spouse name: Not on file    Number of children: Not on file    Years of education: Not on file    Highest education level: Not on file   Occupational History    Not on file   Social Needs    Financial resource strain: Not on file    Food insecurity:     Worry: Not on file     Inability: Not on file    Transportation needs:     Medical: Not on file     Non-medical: Not on file   Tobacco Use    Smoking status: Current Every Day Smoker     Packs/day: 1.50     Years: 42.00     Pack years: 63.00     Types: Cigarettes    Smokeless tobacco: Never Used   Substance and Sexual  "Activity    Alcohol use: No    Drug use: No    Sexual activity: Yes     Partners: Female   Lifestyle    Physical activity:     Days per week: Not on file     Minutes per session: Not on file    Stress: Not on file   Relationships    Social connections:     Talks on phone: Not on file     Gets together: Not on file     Attends [employer] service: Not on file     Active member of club or organization: Not on file     Attends meetings of clubs or organizations: Not on file     Relationship status: Not on file   Other Topics Concern    Not on file   Social History Narrative    Not on file       Review of patient's allergies indicates:   Allergen Reactions    Penicillins Hives       Current Outpatient Medications on File Prior to Visit   Medication Sig Dispense Refill    DIPH,PERTUSS,ACEL,,TET VAC,PF, ADULT (ADACEL,TDAP ADOLESN/ADULT,,PF,) 2 Lf-(2.5-5-3-5 mcg)-5Lf/0.5 mL Syrg Adacel (Tdap Adolesn/Adult)(PF)2 Lf-(2.5-5-3-5)-5 Lf/0.5 mL IM syringe      gabapentin (NEURONTIN) 800 MG tablet Take 1 tablet (800 mg total) by mouth 2 (two) times daily. 180 tablet 1    HYDROcodone-acetaminophen (NORCO) 7.5-325 mg per tablet Take 1 tablet by mouth every 24 hours as needed for Pain. 30 tablet 0    meloxicam (MOBIC) 15 MG tablet Take 1 tablet (15 mg total) by mouth once daily. Do not take with naprosyn / other nsaids 90 tablet 1    predniSONE (DELTASONE) 20 MG tablet prednisone 20 mg tablet      tiZANidine (ZANAFLEX) 2 MG tablet tizanidine 2 mg tablet      varenicline (CHANTIX STARTING MONTH BOX) 0.5 mg (11)- 1 mg (42) tablet Take one 0.5mg tab by mouth once daily X3 days,then increase to one 0.5mg tab twice daily X4 days,then increase to one 1mg tab twice daily 1 Package 0     No current facility-administered medications on file prior to visit.        Objective:      BP (!) 146/81   Pulse 64   Ht 6' 1" (1.854 m)   Wt (!) 145.8 kg (321 lb 6.4 oz)   SpO2 97%   BMI 42.40 kg/m²     Exam:  GEN:  Well developed, well " nourished.  No acute distress.   HEENT:  No trauma.  Mucous membranes moist.  Nares patent bilaterally.  PSYCH: Normal affect. Thought content appropriate.  CHEST:  Breathing symmetric.  No audible wheezing.  ABD: Soft, non-distended.  SKIN:  Warm, pink, dry.  No rash on exposed areas.    EXT:  No cyanosis, clubbing, or edema.  No color change or changes in nail or hair growth.  NEURO/MUSCULOSKELETAL:  Fully alert, oriented, and appropriate. Speech normal dior. No cranial nerve deficits.   Gait:  Normal.  No focal motor deficits.     Full active range of motion of bilateral shoulders without pain            Imaging:  Outside cervical MRI from August of 2018:    At C5-6 there is a mild to moderate broad-based disc bulge with superimposed central disc extrusion that extends superior and inferior to the disc space.  There is associated deformation of spinal cord.  There is moderate right neural foraminal narrowing and moderate to severe left neural foraminal narrowing.    At C6-C7, there is a mild broad-based disc osteophyte complex that effaces the thecal sac.  There is mild-to-moderate right neural foraminal narrowing and moderate to severe left neural foraminal narrowing    At C3-C4 there is a minimal broad-based disc osteophyte complex, partially effacing the ventral thecal sac.  There is mild right neural foraminal narrowing and moderate to severe left neural foraminal narrowing      Assessment:       Encounter Diagnoses   Name Primary?    DDD (degenerative disc disease), cervical Yes    Cervical radiculopathy     Osteoarthritis of spine with radiculopathy, cervical region     Numbness and tingling of both upper extremities          Plan:       Srinivasan was seen today for follow-up.    Diagnoses and all orders for this visit:    DDD (degenerative disc disease), cervical    Cervical radiculopathy    Osteoarthritis of spine with radiculopathy, cervical region    Numbness and tingling of both upper  extremities        Srinivasan Munoz is a 59 y.o. male with chronic left shoulder pain.  Likely from glenohumeral arthritis.  Complete resolution of pain following left shoulder radiofrequency ablation.  Now having recurrent left-sided neck and upper extremity pain. Exact etiology is unclear at this time. Multiple diagnoses on the differential.  May be due to recurrence cervical radicular pain.  May also be related to left carpal tunnel or cubital tunnel pain. Cervical MRI reviewed today.  Significant evidence of multilevel left-sided foraminal stenosis.    1.  Pertinent imaging studies reviewed by me. Imaging results were discussed with patient.  2.  Schedule for C7-T1 interlaminar epidural steroid injection.  3.  Return to clinic 2 weeks after procedure to discuss results.  May consider EMG to investigate for peripheral nerve entrapment if symptoms persist.

## 2019-09-17 NOTE — PROGRESS NOTES
Mr. Blas will be scheduled for C7-T1 JHOAN on 10/02/2019.  Reviewed the pre-procedure instructions listed below with him- copy will also be sent via [employer].  Instructed patient to notify clinic if he  begins feeling ill, runs a fever, is prescribed antibiotics, and/or has any outpatient procedures within the two weeks leading up to this procedure.  Instructed patient to report to [facility], 2nd Floor Endoscopy Registration Desk.  All questions answered- patient verbalized understanding.     Day of Procedure  - Ensure you have obtained an arrival time from the Pain Management Staff  o Procedure Area will call 1-3 days in advance with your arrival time.  Please check any voicemails received.  o If you arrive past your scheduled procedure time, you may be asked to reschedule your procedure.  - Ensure you have a  with you to remain present throughout your procedure for your safety.  o If you arrive without a responsible adult to stay with you and drive you home, you may be asked to reschedule your procedure.  - Take all of your prescribed medications (exceptions noted below) with a small amount of water  - This is NOT a fasting procedure, you may have a light meal before coming.  - Wear comfortable clothing (loose fitting pants).  - You may wear glasses, dentures, contact lenses, and/or hearing aids. Please remove all jewelry and metal hairpins.  - Notify the nurse during the intake process if you are allergic to any medications, if you are diabetic, or if you are not feeling well  - Contact the Pain Management Clinic with the following:  o A fever greater than 100° (degrees)   o Feel ill, have any type of infection, or are taking antibiotics now or within the two (2) weeks leading up to this procedure  o Have any outpatient procedures within the two (2) weeks leading up to this procedure (colonoscopy, major dental work, etc.)    IF you are taking blood thinners: Only upon receiving clearance  and notification from the Pain Management Department  7 Days Prior to Your Procedure:  - Stop taking Plavix/Clopridogrel, Effient/Prasugel, ASA  5 Days Prior to Your Procedure:  - Stop taking Coumadin/Warfarin.  An INR may be drawn prior to your procedure.  If labs are required, you will need to arrive earlier than your scheduled arrival time.  - Stop taking Pradaxa/Dabigatra,  Brilinta/ Ticagrelor  3 Days Prior to Your Procedure:  - Stop taking Xarelto/Rivaroxaban, Eliquis/Apixaban, Aggrenox/Dipyridamole, Reopro/Abciximab

## 2019-09-26 ENCOUNTER — TELEPHONE (OUTPATIENT)
Dept: PAIN MEDICINE | Facility: CLINIC | Age: 59
End: 2019-09-26

## 2019-09-26 NOTE — TELEPHONE ENCOUNTER
Reviewed pre-procedure instructions with Mr. Blas, as well as provided arrival time of 1130.  All questions answered- patient verbalized understanding.

## 2019-10-02 ENCOUNTER — HOSPITAL ENCOUNTER (OUTPATIENT)
Facility: HOSPITAL | Age: 59
Discharge: HOME | End: 2019-10-02
Attending: PAIN MEDICINE | Admitting: PAIN MEDICINE
Payer: COMMERCIAL

## 2019-10-02 ENCOUNTER — HOSPITAL ENCOUNTER (OUTPATIENT)
Dept: RADIOLOGY | Facility: HOSPITAL | Age: 59
Discharge: HOME | End: 2019-10-02
Attending: PAIN MEDICINE | Admitting: PAIN MEDICINE
Payer: COMMERCIAL

## 2019-10-02 VITALS
RESPIRATION RATE: 18 BRPM | OXYGEN SATURATION: 98 % | SYSTOLIC BLOOD PRESSURE: 128 MMHG | TEMPERATURE: 98 F | HEART RATE: 74 BPM | DIASTOLIC BLOOD PRESSURE: 60 MMHG

## 2019-10-02 DIAGNOSIS — M50.30 DDD (DEGENERATIVE DISC DISEASE), CERVICAL: ICD-10-CM

## 2019-10-02 DIAGNOSIS — M54.12 CERVICAL RADICULOPATHY: Primary | ICD-10-CM

## 2019-10-02 PROCEDURE — 62321 NJX INTERLAMINAR CRV/THRC: CPT | Mod: ,,, | Performed by: PAIN MEDICINE

## 2019-10-02 PROCEDURE — 25500020 PHARM REV CODE 255: Performed by: PAIN MEDICINE

## 2019-10-02 PROCEDURE — 63600175 PHARM REV CODE 636 W HCPCS: Performed by: PAIN MEDICINE

## 2019-10-02 PROCEDURE — 25000003 PHARM REV CODE 250: Performed by: PAIN MEDICINE

## 2019-10-02 PROCEDURE — 62320 NJX INTERLAMINAR CRV/THRC: CPT | Performed by: PAIN MEDICINE

## 2019-10-02 PROCEDURE — 62321 PR INJ CERV/THORAC, W/GUIDANCE: ICD-10-PCS | Mod: ,,, | Performed by: PAIN MEDICINE

## 2019-10-02 PROCEDURE — 25000003 PHARM REV CODE 250

## 2019-10-02 PROCEDURE — 62321 NJX INTERLAMINAR CRV/THRC: CPT | Performed by: PAIN MEDICINE

## 2019-10-02 RX ORDER — DEXAMETHASONE SODIUM PHOSPHATE 10 MG/ML
10 INJECTION INTRAMUSCULAR; INTRAVENOUS ONCE
Status: COMPLETED | OUTPATIENT
Start: 2019-10-02 | End: 2019-10-02

## 2019-10-02 RX ORDER — LIDOCAINE HYDROCHLORIDE 10 MG/ML
1 INJECTION, SOLUTION EPIDURAL; INFILTRATION; INTRACAUDAL; PERINEURAL ONCE
Status: COMPLETED | OUTPATIENT
Start: 2019-10-02 | End: 2019-10-02

## 2019-10-02 RX ORDER — ALPRAZOLAM 0.5 MG/1
TABLET, ORALLY DISINTEGRATING ORAL
Status: COMPLETED
Start: 2019-10-02 | End: 2019-10-02

## 2019-10-02 RX ORDER — ALPRAZOLAM 0.5 MG/1
1 TABLET, ORALLY DISINTEGRATING ORAL ONCE AS NEEDED
Status: COMPLETED | OUTPATIENT
Start: 2019-10-02 | End: 2019-10-02

## 2019-10-02 RX ORDER — LIDOCAINE HYDROCHLORIDE 20 MG/ML
INJECTION, SOLUTION INFILTRATION; PERINEURAL
Status: DISCONTINUED
Start: 2019-10-02 | End: 2019-10-02 | Stop reason: HOSPADM

## 2019-10-02 RX ORDER — LIDOCAINE HYDROCHLORIDE 20 MG/ML
10 INJECTION, SOLUTION INFILTRATION; PERINEURAL ONCE
Status: COMPLETED | OUTPATIENT
Start: 2019-10-02 | End: 2019-10-02

## 2019-10-02 RX ORDER — LIDOCAINE HYDROCHLORIDE 10 MG/ML
INJECTION, SOLUTION EPIDURAL; INFILTRATION; INTRACAUDAL; PERINEURAL
Status: DISCONTINUED
Start: 2019-10-02 | End: 2019-10-02 | Stop reason: HOSPADM

## 2019-10-02 RX ORDER — DEXAMETHASONE SODIUM PHOSPHATE 10 MG/ML
INJECTION INTRAMUSCULAR; INTRAVENOUS
Status: DISCONTINUED
Start: 2019-10-02 | End: 2019-10-02 | Stop reason: HOSPADM

## 2019-10-02 RX ADMIN — ALPRAZOLAM 1 MG: 0.5 TABLET, ORALLY DISINTEGRATING ORAL at 11:10

## 2019-10-02 NOTE — OP NOTE
Cervical Interlaminar Epidural Steroid Injection under fluoroscopic guidance.  Time-out taken to identify patient and procedure side prior to starting the procedure.   I attest that I have reviewed the patient's home medications prior to the procedure and no contraindication have been identified. I re-evaluated the patient after the patient was positioned for the procedure in the procedure room immediately before the procedural time-out. The vital signs are current and represent the current state of the patient which has not significantly changed since the preprocedure assessment.                                                              Date of Service: 10/02/2019    PCP: Yolanda Marques MD    Referring Physician:    Procedure: C7-T1 cervical interlaminar epidural steroid injection under fluoroscopy.    Reasons for procedure: DDD (degenerative disc disease), cervical [M50.30]  Cervical radiculopathy [M54.12]    Physician: Dameon Austin MD  ASSISTANTS: None    Medications injected:  Preservative-free dexamethasone 10mg and Xylocaine 1% MPF 1ml.  This was followed by a slow injection of 4 mL sterile, preservative-free normal saline.    Local anesthetic used: Xylocaine 2% 2ml.     Sedation Medications: None    Complications:  none.    Estimated blood loss: none.    Technique:  With the patient laying in a prone position with the neck in a flexed forward position, the area was prepped and draped in the usual sterile fashion using ChloraPrep and a fenestrated drape.  The area was determined under fluoroscopic guidance.  Local anesthetic was given using a 27-gauge needle by raising a wheal and going down to the osseous elements of the cervical spine.  A 3.5 inch 20-gauge Touhy needle was introduced under fluoroscopic guidance to meet the lamina of T1.  The needle was then hinged under the lamina then advanced using loss of resistance technique.  Once the tip of the needle was in the desired position, the contrast dye  Omnipaque was injected to determine placement and no vascular runoff.  Digital subtraction was employed to confirm that there is no vascular runoff.  The steroid was then injected slowly followed by a slow injection of the 4 mL of the sterile preservative-free normal saline.  The patient tolerated the procedure well.    Pain before the procedure: 6/10    Pain after the procedure: 0/10    The patient was monitored after the procedure and was given post-procedure and discharge instructions to follow at home. The patient was discharged in a stable condition

## 2019-10-02 NOTE — DISCHARGE INSTRUCTIONS

## 2019-10-03 ENCOUNTER — PATIENT MESSAGE (OUTPATIENT)
Dept: PAIN MEDICINE | Facility: CLINIC | Age: 59
End: 2019-10-03

## 2019-10-09 ENCOUNTER — TELEPHONE (OUTPATIENT)
Dept: FAMILY MEDICINE | Facility: CLINIC | Age: 59
End: 2019-10-09

## 2019-10-09 NOTE — TELEPHONE ENCOUNTER
----- Message from Elsie Bowersjulian sent at 10/9/2019  4:11 PM CDT -----  Contact: Self   Type: Patient Call Back    Who called: Self     What is the request in detail:  Patient is asking for a [facility] to [place] him to get his shingles shot since he is under 65. Please advise   Can the clinic reply by [facility]?  Call   Would the patient rather a call back or a response via [facility]? Call   Best call back number:   [phone]  Additional Information

## 2019-10-09 NOTE — LETTER
October 10, 2019    Srinivasan Munoz  [address] [place] [address]             [facility]  [address] [place] [address]  Phone: [phone]  Fax: [phone]           To Whom It May Concern:      According to the CD...  CDC recommends that healthy adults 50 years and older get two doses of the shingles vaccine called Shingrix (recombinant zoster vaccine),  by 2 to 6 months, to prevent shingles and the complications from the disease.     Mr. Munoz can get the shingrix vaccine.    Thank you,  Yolanda Marques MD

## 2019-10-10 ENCOUNTER — TELEPHONE (OUTPATIENT)
Dept: FAMILY MEDICINE | Facility: CLINIC | Age: 59
End: 2019-10-10

## 2019-10-10 DIAGNOSIS — Z00.00 ANNUAL PHYSICAL EXAM: Primary | ICD-10-CM

## 2019-10-10 DIAGNOSIS — Z00.00 GENERAL MEDICAL EXAM: ICD-10-CM

## 2019-10-10 NOTE — TELEPHONE ENCOUNTER
Patient requesting letter for shingles injection at [facility] as he is not 66y/o. Does not want office visit. Please advise

## 2019-10-10 NOTE — TELEPHONE ENCOUNTER
According to the CD...  CDC recommends that healthy adults 50 years and older get two doses of the shingles vaccine called Shingrix (recombinant zoster vaccine),  by 2 to 6 months, to prevent shingles and the complications from the disease.     Mr. Munoz can get the shingrix vaccine.    Thank you,  Yolanda Marques MD

## 2019-10-10 NOTE — TELEPHONE ENCOUNTER
----- Message from Oksana Pizarro sent at 10/10/2019  2:58 PM CDT -----  Contact: Self   Type: Patient Call Back    What is the request in detail: Pt calling regarding letter to receive shingles shot.    Can the clinic reply by [facility]? No    Would the patient rather a call back or a response via [facility]? Call back     Best call back number:[phone]

## 2019-10-10 NOTE — LETTER
October 10, 2019      [facility]  [address] [place] [address]  Phone: [phone]  Fax: [phone]       Patient: Srinivasan Munoz   YOB: 1960  Date of Visit: 10/10/2019    To Whom It May Concern:    Federico Munoz is requesting a shingles vaccine.     CDC recommends that healthy adults 50 years and older get two doses of the shingles vaccine called Shingrix (recombinant zoster vaccine),  by 2 to 6 months, to prevent shingles and the complications from the disease. Patient can have the shingrix vaccine.     https://www.cdc.gov/vaccines/vpd/shingles/public/shingrix/index.html#targetText=CDC%20recommends%20that%20healthy%20adults,the%20complications%20from%20the%20disease.      Sincerely,      Yolanda Marques MD

## 2019-10-10 NOTE — TELEPHONE ENCOUNTER
Patient's wife informed of message below. She asked that the message is faxed to [facility] at [phone].  Letter faxed.

## 2019-10-10 NOTE — TELEPHONE ENCOUNTER
Patient due for annual blood work  Last blood work was done 9/2018  I will order  Patient will need to schedule an annual  shingrix was sent to pharmacy

## 2019-10-10 NOTE — TELEPHONE ENCOUNTER
----- Message from Latoya Espinoza sent at 10/10/2019  3:26 PM CDT -----  Contact: Patient [phone]  Type: Patient Call Back    Who called: Patient     What is the request in detail: Pt states that he can not come in for an shingle injection. Pt states that he has to work he wants it send to [facility]. Please advise    Can the clinic reply by [facility]? No     Would the patient rather a call back or a response via [facility]? Call back    Best call back number:[phone]

## 2019-10-12 ENCOUNTER — PATIENT OUTREACH (OUTPATIENT)
Dept: ADMINISTRATIVE | Facility: OTHER | Age: 59
End: 2019-10-12

## 2019-10-24 ENCOUNTER — TELEPHONE (OUTPATIENT)
Dept: SMOKING CESSATION | Facility: CLINIC | Age: 59
End: 2019-10-24

## 2019-11-18 ENCOUNTER — OFFICE VISIT (OUTPATIENT)
Dept: FAMILY MEDICINE | Facility: CLINIC | Age: 59
End: 2019-11-18
Payer: COMMERCIAL

## 2019-11-18 VITALS
HEIGHT: 73 IN | HEART RATE: 96 BPM | SYSTOLIC BLOOD PRESSURE: 138 MMHG | WEIGHT: 315 LBS | TEMPERATURE: 99 F | BODY MASS INDEX: 41.75 KG/M2 | DIASTOLIC BLOOD PRESSURE: 80 MMHG | OXYGEN SATURATION: 100 %

## 2019-11-18 DIAGNOSIS — B96.89 ACUTE BACTERIAL SINUSITIS: Primary | ICD-10-CM

## 2019-11-18 DIAGNOSIS — J01.90 ACUTE BACTERIAL SINUSITIS: ICD-10-CM

## 2019-11-18 DIAGNOSIS — J01.90 ACUTE BACTERIAL SINUSITIS: Primary | ICD-10-CM

## 2019-11-18 DIAGNOSIS — B96.89 ACUTE BACTERIAL SINUSITIS: ICD-10-CM

## 2019-11-18 PROCEDURE — 99999 PR PBB SHADOW E&M-EST. PATIENT-LVL IV: ICD-10-PCS | Mod: PBBFAC,,, | Performed by: NURSE PRACTITIONER

## 2019-11-18 PROCEDURE — 3008F BODY MASS INDEX DOCD: CPT | Mod: CPTII,S$GLB,, | Performed by: NURSE PRACTITIONER

## 2019-11-18 PROCEDURE — 99999 PR PBB SHADOW E&M-EST. PATIENT-LVL IV: CPT | Mod: PBBFAC,,, | Performed by: NURSE PRACTITIONER

## 2019-11-18 PROCEDURE — 96372 THER/PROPH/DIAG INJ SC/IM: CPT | Mod: S$GLB,,, | Performed by: NURSE PRACTITIONER

## 2019-11-18 PROCEDURE — 99214 PR OFFICE/OUTPT VISIT, EST, LEVL IV, 30-39 MIN: ICD-10-PCS | Mod: 25,S$GLB,, | Performed by: NURSE PRACTITIONER

## 2019-11-18 PROCEDURE — 96372 PR INJECTION,THERAP/PROPH/DIAG2ST, IM OR SUBCUT: ICD-10-PCS | Mod: S$GLB,,, | Performed by: NURSE PRACTITIONER

## 2019-11-18 PROCEDURE — 3008F PR BODY MASS INDEX (BMI) DOCUMENTED: ICD-10-PCS | Mod: CPTII,S$GLB,, | Performed by: NURSE PRACTITIONER

## 2019-11-18 PROCEDURE — 99214 OFFICE O/P EST MOD 30 MIN: CPT | Mod: 25,S$GLB,, | Performed by: NURSE PRACTITIONER

## 2019-11-18 RX ORDER — HYDROCODONE BITARTRATE AND ACETAMINOPHEN 7.5; 325 MG/1; MG/1
TABLET ORAL
COMMUNITY
End: 2020-03-10

## 2019-11-18 RX ORDER — TIZANIDINE 2 MG/1
TABLET ORAL
COMMUNITY
End: 2020-03-10

## 2019-11-18 RX ORDER — GABAPENTIN 600 MG/1
TABLET ORAL
COMMUNITY
End: 2020-03-10

## 2019-11-18 RX ORDER — DOXYCYCLINE 100 MG/1
100 CAPSULE ORAL EVERY 12 HOURS
Qty: 20 CAPSULE | Refills: 0 | Status: SHIPPED | OUTPATIENT
Start: 2019-11-18 | End: 2019-11-28

## 2019-11-18 RX ORDER — CODEINE PHOSPHATE AND GUAIFENESIN 10; 100 MG/5ML; MG/5ML
5 SOLUTION ORAL 3 TIMES DAILY PRN
Qty: 118 ML | Refills: 0 | Status: SHIPPED | OUTPATIENT
Start: 2019-11-18 | End: 2019-11-18 | Stop reason: SDUPTHER

## 2019-11-18 RX ORDER — CODEINE PHOSPHATE AND GUAIFENESIN 10; 100 MG/5ML; MG/5ML
5 SOLUTION ORAL 3 TIMES DAILY PRN
Qty: 118 ML | Refills: 0 | Status: SHIPPED | OUTPATIENT
Start: 2019-11-18 | End: 2019-11-28

## 2019-11-18 RX ORDER — PREDNISONE 20 MG/1
TABLET ORAL
COMMUNITY
End: 2020-01-23

## 2019-11-18 RX ORDER — METHYLPREDNISOLONE 4 MG/1
TABLET ORAL
Qty: 1 PACKAGE | Refills: 0 | Status: SHIPPED | OUTPATIENT
Start: 2019-11-18 | End: 2020-01-23

## 2019-11-18 RX ORDER — TRIAMCINOLONE ACETONIDE 40 MG/ML
40 INJECTION, SUSPENSION INTRA-ARTICULAR; INTRAMUSCULAR
Status: COMPLETED | OUTPATIENT
Start: 2019-11-18 | End: 2019-11-18

## 2019-11-18 RX ORDER — GABAPENTIN 800 MG/1
TABLET ORAL
COMMUNITY
End: 2020-03-10

## 2019-11-18 RX ORDER — GABAPENTIN 300 MG/1
CAPSULE ORAL
COMMUNITY
End: 2020-03-10

## 2019-11-18 RX ADMIN — TRIAMCINOLONE ACETONIDE 40 MG: 40 INJECTION, SUSPENSION INTRA-ARTICULAR; INTRAMUSCULAR at 12:11

## 2019-11-18 NOTE — PROGRESS NOTES
Subjective:       Patient ID: Srinivasan Munoz is a 59 y.o. male.    Chief Complaint: Sinus Problem (cough)    Cough   This is a new problem. The current episode started 1 to 4 weeks ago. The problem has been gradually worsening. The problem occurs every few minutes. The cough is productive of sputum. Associated symptoms include headaches, nasal congestion, postnasal drip, rhinorrhea, a sore throat and shortness of breath. Pertinent negatives include no chest pain, chills, ear congestion, ear pain, fever, heartburn, hemoptysis, myalgias, rash, sweats, weight loss or wheezing. The symptoms are aggravated by lying down. He has tried a beta-agonist inhaler for the symptoms.   URI    This is a new problem. The current episode started 1 to 4 weeks ago. The problem has been gradually worsening. There has been no fever. Associated symptoms include congestion, coughing, diarrhea, headaches, rhinorrhea, sinus pain, sneezing and a sore throat. Pertinent negatives include no abdominal pain, chest pain, dysuria, ear pain, joint pain, joint swelling, nausea, neck pain, plugged ear sensation, rash, swollen glands, vomiting or wheezing. He has tried decongestant (rubi seltzer plus and mucinex) for the symptoms.     Review of Systems   Constitutional: Negative for chills, fever and weight loss.   HENT: Positive for congestion, postnasal drip, rhinorrhea, sinus pain, sneezing and sore throat. Negative for ear pain.    Respiratory: Positive for cough and shortness of breath. Negative for hemoptysis and wheezing.    Cardiovascular: Negative for chest pain.   Gastrointestinal: Positive for diarrhea. Negative for abdominal pain, heartburn, nausea and vomiting.   Genitourinary: Negative for dysuria.   Musculoskeletal: Negative for joint pain, myalgias and neck pain.   Skin: Negative for rash.   Neurological: Positive for headaches.       Objective:      Physical Exam   Constitutional: He is oriented to person, place, and time. Vital  signs are normal. He appears well-developed and well-nourished.   HENT:   Head: Normocephalic and atraumatic.   Right Ear: Tympanic membrane, external ear and ear canal normal.   Left Ear: Tympanic membrane, external ear and ear canal normal.   Nose: Mucosal edema and rhinorrhea present. Right sinus exhibits frontal sinus tenderness. Right sinus exhibits no maxillary sinus tenderness. Left sinus exhibits frontal sinus tenderness. Left sinus exhibits no maxillary sinus tenderness.   Mouth/Throat: Uvula is midline, oropharynx is clear and moist and mucous membranes are normal. No oropharyngeal exudate or posterior oropharyngeal erythema.   Cardiovascular: Normal rate, regular rhythm and normal heart sounds.   Pulmonary/Chest: Effort normal and breath sounds normal. No respiratory distress. He has no wheezes. He has no rales.   Lymphadenopathy:     He has cervical adenopathy.   Neurological: He is alert and oriented to person, place, and time.   Skin: Skin is warm, dry and intact. No rash noted.   Psychiatric: He has a normal mood and affect.   Vitals reviewed.      Assessment:       1. Acute bacterial sinusitis        Plan:       Srinivasan was seen today for sinus problem.    Diagnoses and all orders for this visit:    Acute bacterial sinusitis  -     doxycycline (VIBRAMYCIN) 100 MG Cap; Take 1 capsule (100 mg total) by mouth every 12 (twelve) hours. for 10 days  -     Discontinue: guaifenesin-codeine 100-10 mg/5 ml (TUSSI-ORGANIDIN NR)  mg/5 mL syrup; Take 5 mLs by mouth 3 (three) times daily as needed for Cough.  -     methylPREDNISolone (MEDROL DOSEPACK) 4 mg tablet; use as directed  -     triamcinolone acetonide injection 40 mg    HOME CARE:  · Drink plenty of water, hot tea, and other liquids to stay well hydrated. This thins the mucus and promotes sinus drainage.  · Apply heat to the painful areas of the face. Use a towel soaked in hot water. Or,  the shower and direct the hot spray onto your face.  This is a good way to inhale warm water vapor and get heat on your face at the same time. (Cover your mouth and nose with your hands so you can still breathe as you do this.)  · Use a vaporizer with products such as Vicks VapoRub (contains menthol) at night. Suck on peppermint, menthol or eucalyptus hard candies during the day.  · An expectorant containing guaifenesin (such as Robitussin), helps to thin the mucus and promote drainage from the sinuses.  · Over-the-counter decongestants may be used unless a similar medicine was prescribed. Nasal sprays work the fastest. Use one that contains phenylephrine (Juan R-synephrine, Sinex and others) or oxymetazoline (Afrin). First blow the nose gently to remove mucus, then apply the drops. Do not use these medicines more often than directed on the label or for more than three days or symptoms may worsen. You may also use tablets containing pseudoephedrine (Sudafed). Many sinus remedies combine ingredients, which may increase side effects. Read the labels or ask the pharmacist for help. NOTE: Persons with high blood pressure should not use decongestants. They can raise blood pressure.  · Antihistamines are useful if allergies are a cause of your sinusitis. The mildest one is chlorpheniramine (available without a prescription). The dose for adults is 8-12mg three times a day. [NOTE: Do not use chlorpheniramine if you have glaucoma or if you are a man with trouble urinating due to an enlarged prostate.] Claritin (loratidine) is an antihistamine that causes less drowsiness and is a good alternative for daytime use.  · Do not use nasal rinses or irrigation during an acute sinus infection, unless advised by your doctor. Rinsing may spread the infection to other sinuses.  · You may use acetaminophen (Tylenol) or ibuprofen (Motrin, Advil) to control pain, unless another pain medicine was prescribed. [ NOTE: If you have chronic liver or kidney disease or ever had a stomach ulcer, talk  with your doctor before using these medicines.] (Aspirin should never be used in anyone under 18 years of age who is ill with a fever. It may cause severe liver damage.)  · Finish the full course, even if you are feeling better after a few days.  FOLLOW UP with your doctor or this facility in one week or as instructed by our staff if not improving.  GET PROMPT MEDICAL ATTENTION if any of the following occur:  · Facial pain or headache becomes more severe  · Stiff neck  · Unusual drowsiness or confusion, or not acting like your normal self  · Swelling of the forehead or eyelids  · Vision problems including blurred or double vision  · Fever of 100.4ºF (38ºC) or higher, or as directed by your healthcare provider  · Seizure  © 9411-7903 [facility], [address] [place] [address], [place] [address]. All rights reserved. This information is not intended as a substitute for professional medical care. Always follow your healthcare professional's instructions.

## 2019-11-18 NOTE — TELEPHONE ENCOUNTER
----- Message from Zaynab Vallejo sent at 11/18/2019  1:12 PM CST -----  [facility]: [facility] [phone]  Type: RX Refill Request    [facility] [phone]    Have you contacted your pharmacy:    Refill or New Rx:guaifenesin-codeine 100-10 mg/5 ml (TUSSI-ORGANIDIN NR)  mg/5 mL syrup - pharmacy wants to know if they can change to virvtussin ac.that is the brand the pharmacy carries. Call pharmacy    RX Name and Strength:    How is the patient currently taking it? (ex. 1XDay):    Is this a 30 day or 90 day RX:    Preferred Pharmacy with phone number:    Local or Mail Order:    Ordering Provider:    Would the patient rather a call back or a response via [facility]?     Best Call Back Number:  Additional Information:

## 2019-11-18 NOTE — TELEPHONE ENCOUNTER
Per SKYLA Goyal  It is ok to change medication down below to the brand that is carried at [facility]. Spoke to john paul with [facility] pharmacy and med is being filled.

## 2019-12-13 ENCOUNTER — TELEPHONE (OUTPATIENT)
Dept: SMOKING CESSATION | Facility: CLINIC | Age: 59
End: 2019-12-13

## 2020-01-23 ENCOUNTER — OFFICE VISIT (OUTPATIENT)
Dept: FAMILY MEDICINE | Facility: CLINIC | Age: 60
End: 2020-01-23
Payer: COMMERCIAL

## 2020-01-23 VITALS
OXYGEN SATURATION: 98 % | WEIGHT: 310.94 LBS | SYSTOLIC BLOOD PRESSURE: 154 MMHG | TEMPERATURE: 98 F | HEIGHT: 73 IN | HEART RATE: 93 BPM | DIASTOLIC BLOOD PRESSURE: 98 MMHG | BODY MASS INDEX: 41.21 KG/M2

## 2020-01-23 DIAGNOSIS — R20.2 NUMBNESS AND TINGLING OF BOTH UPPER EXTREMITIES: ICD-10-CM

## 2020-01-23 DIAGNOSIS — M54.12 CERVICAL RADICULOPATHY: Primary | ICD-10-CM

## 2020-01-23 DIAGNOSIS — M50.30 DDD (DEGENERATIVE DISC DISEASE), CERVICAL: ICD-10-CM

## 2020-01-23 DIAGNOSIS — M79.672 BILATERAL FOOT PAIN: ICD-10-CM

## 2020-01-23 DIAGNOSIS — R20.0 NUMBNESS AND TINGLING OF BOTH UPPER EXTREMITIES: ICD-10-CM

## 2020-01-23 DIAGNOSIS — M79.671 BILATERAL FOOT PAIN: ICD-10-CM

## 2020-01-23 DIAGNOSIS — E66.9 OBESITY (BMI 30-39.9): Chronic | ICD-10-CM

## 2020-01-23 DIAGNOSIS — M25.50 PAIN IN JOINT INVOLVING MULTIPLE SITES: ICD-10-CM

## 2020-01-23 PROCEDURE — 99214 PR OFFICE/OUTPT VISIT, EST, LEVL IV, 30-39 MIN: ICD-10-PCS | Mod: 25,S$GLB,, | Performed by: NURSE PRACTITIONER

## 2020-01-23 PROCEDURE — 3008F PR BODY MASS INDEX (BMI) DOCUMENTED: ICD-10-PCS | Mod: CPTII,S$GLB,, | Performed by: NURSE PRACTITIONER

## 2020-01-23 PROCEDURE — 3008F BODY MASS INDEX DOCD: CPT | Mod: CPTII,S$GLB,, | Performed by: NURSE PRACTITIONER

## 2020-01-23 PROCEDURE — 99999 PR PBB SHADOW E&M-EST. PATIENT-LVL IV: CPT | Mod: PBBFAC,,, | Performed by: NURSE PRACTITIONER

## 2020-01-23 PROCEDURE — 99999 PR PBB SHADOW E&M-EST. PATIENT-LVL IV: ICD-10-PCS | Mod: PBBFAC,,, | Performed by: NURSE PRACTITIONER

## 2020-01-23 PROCEDURE — 99214 OFFICE O/P EST MOD 30 MIN: CPT | Mod: 25,S$GLB,, | Performed by: NURSE PRACTITIONER

## 2020-01-23 PROCEDURE — 96372 THER/PROPH/DIAG INJ SC/IM: CPT | Mod: S$GLB,,, | Performed by: NURSE PRACTITIONER

## 2020-01-23 PROCEDURE — 96372 PR INJECTION,THERAP/PROPH/DIAG2ST, IM OR SUBCUT: ICD-10-PCS | Mod: S$GLB,,, | Performed by: NURSE PRACTITIONER

## 2020-01-23 RX ORDER — KETOROLAC TROMETHAMINE 30 MG/ML
60 INJECTION, SOLUTION INTRAMUSCULAR; INTRAVENOUS
Status: COMPLETED | OUTPATIENT
Start: 2020-01-23 | End: 2020-01-23

## 2020-01-23 RX ORDER — METHYLPREDNISOLONE 4 MG/1
TABLET ORAL
Qty: 1 PACKAGE | Refills: 0 | Status: SHIPPED | OUTPATIENT
Start: 2020-01-23 | End: 2020-01-31

## 2020-01-23 RX ADMIN — KETOROLAC TROMETHAMINE 60 MG: 30 INJECTION, SOLUTION INTRAMUSCULAR; INTRAVENOUS at 02:01

## 2020-01-23 NOTE — PATIENT INSTRUCTIONS
"  Paraesthesias  Paraesthesia is a burning or prickling sensation that is sometimes felt in the hands, arms, legs or feet. It can also occur in other parts of the body. It can also feel like tingling or numbness, skin crawling, or itching. The feeling is not comfortable, but it is not painful. (The "pins and needles" feeling that happens when a foot or hand "falls asleep" is a temporary paraesthesia.)  Paraesthesias that last or come and go may be caused by medical issues that need to be treated. These include stroke, a bulging disk pressing on a nerve, a trapped nerve, vitamin deficiencies, or even certain medicines.  Tests are often done. These tests may include blood tests, X-ray, CT (computerized tomography) scan, or a muscle test (electromyography). Depending on the cause, treatment may include physical therapy.  Home care  · Tell the healthcare provider about all medicines you take. This includes prescription and over-the-counter medicines, vitamins, and herbs. Ask if any of the medicines may be causing your problems. Do not make any changes to prescription medicines without talking to your healthcare provider first.  · You may be prescribed medicines to help relieve the tingling feeling or for pain. Take all medicines as directed.  · A numb hand or foot may be more prone to injury. To help protect it:  ¨ Always use oven mitts.  ¨ Test water with an unaffected hand or foot.  ¨ Use caution when trimming nails. File sharp areas.  ¨ Wear shoes that fit well to avoid pressure points, blisters, and ulcers.  ¨ Inspect your hands and feet carefully (including the soles of your feet and between your toes) at least once a week. If you see red areas, sores, or other problems, tell your healthcare provider.  Follow-up care  Follow up with your doctor or as advised by our staff. You may need further testing or evaluation.  When to seek medical advice  Call your healthcare provider right away if any of the following " occur:  · Numbness or weakness of the face, one arm, or one leg  · Slurred speech, confusion, trouble speaking, walking, or seeing  · Severe headache, fainting spell, dizziness, or seizure  · Chest, arm, neck, or upper back pain  · Loss of bladder or bowel control  · Open wound with redness, swelling, or pus  Date Last Reviewed: 9/25/2015  © 0077-1815 [employer]. [address] [place] [address], [place] [address]. All rights reserved. This information is not intended as a substitute for professional medical care. Always follow your healthcare professional's instructions.        Neck Spasm     A spasm of the neck muscles can happen after a sudden awkward neck movement. Sleeping with your neck in a crooked position can also cause spasm. Some people respond to emotional stress by tensing the muscles of their neck, shoulders, and upper back. If neck spasm lasts long enough, it can cause headache.  The treatment described below will usually help the pain to go away in 5 to 7 days. Pain that continues may need further evaluation or other types of treatment such as physical therapy.  Home care  · Rest and relax the muscles. Use a comfortable pillow that supports the head and keeps the spine in a neutral position. The position of the head should not be tilted forward or backward. A rolled up towel may help for a custom fit.  · Some people find relief with heat. Heat can be applied with either a warm shower or bath or a moist towel heated in the microwave and massage. Others prefer cold packs. You can make an ice pack by filling a plastic bag that seals at the top with ice cubes or crushed ice and then wrapping it with a thin towel. Try both and use the method that feels best for 15 to 20 minutes, several times a day.  · Whether using ice or heat, be careful that you do not injure your skin. Never put ice directly on the skin. Always wrap the ice in a towel or other type of cloth. This is very important, especially in  people with poor skin sensations.  · Try to reduce your stress level. Emotional stress can lead to neck muscle tension and get in the way of or delay the healing process.  · You may use over-the-counter pain medicine to control pain, unless another medicine was prescribed.If you have chronic liver or kidney disease or ever had a stomach ulcer or GI bleeding, talk with your healthcare provider before using these medicines.  Follow-up care  Follow up with your healthcare provider if your symptoms do not show signs of improvement after one week. Physical therapy or further tests may be needed.  If X-rays, CT scans, or MRI scans were taken, you will be told of any new findings that may affect your care.  Call 911  Call 911 if you have:  · Sudden weakness or numbness in one or both arms  · Neck swelling, difficulty or painful swallowing  · Difficulty breathing  · Chest pain  When to seek medical advice  Call your healthcare provider right away if any of these occur:  · Pain becomes worse or spreads into one or both arms  · Increasing headache with nausea or vomiting  · Fever of 100.4°F (38°C) or above lasting for 24 to 48 hours  Date Last Reviewed: 11/21/2015  © 9673-6995 [employer]. [address] [place] [address]. All rights reserved. This information is not intended as a substitute for professional medical care. Always follow your healthcare professional's instructions.        Understanding Heel Pain    Your heel is the back part of your foot. A band of tissue called the plantar fascia connects the heel bone to the bones in the ball of your foot. Nerves run from the heel up the inside of your ankle and into your leg. When you feel pain in the bottom of your heel, the plantar fascia may be inflamed. Overuse, Achilles tightness, or excess body weight can cause the tissue to tear or pull away from the bone. Sometimes the inflamed plantar fascia also irritates a nerve, causing more pain.  What  causes heel pain?  Wearing shoes with poor cushioning can irritate the tissue in your heel (plantar fascia). Being overweight or standing for long periods can also irritate the tissue. Running, walking, tennis, and other sports that put stress on the heels can cause tiny tears in the tissue. If your lower leg muscles are tight, this is more likely to occur. A tight Achilles tendon will also contribute to heel pain.  Symptoms  You may feel pain on the bottom or on the inside edge of your heel. The pain may be sharp when you get out of bed or when you stand up after sitting for a while. You may feel a dull ache in your heel after youve been standing for a long time on a hard surface. Running can also cause a dull ache.  Preventing future problems  To prevent future heel pain, wear shoes with well-cushioned heels. And do exercises prescribed by your healthcare provider to stretch the plantar fascia and the muscles in the lower leg.   Date Last Reviewed: 9/10/2015  © 4575-1443 [employer]. [address] [place] [address], [place] [address]. All rights reserved. This information is not intended as a substitute for professional medical care. Always follow your healthcare professional's instructions.

## 2020-01-23 NOTE — PROGRESS NOTES
Subjective:       Patient ID: Srinivasan Munoz is a 59 y.o. male.    Chief Complaint: Hand Pain (patient states bilateral arm and hand pain.); Foot Pain (bilateral foot pain.); and Neck Pain    Hand Pain    Incident onset: this has been going on for longer than 2 months. There was no injury mechanism. The pain is present in the left hand and right hand (cervical spine and foot pain ). The quality of the pain is described as aching. The pain is at a severity of 7/10. The pain is moderate. The pain has been fluctuating since the incident. Associated symptoms include numbness (hands). Pertinent negatives include no chest pain or tingling. He has tried NSAIDs and acetaminophen for the symptoms. The treatment provided no relief.       Past Medical History:   Diagnosis Date    Arthritis     Eye injury 20 yrs ago    nail stuck in od     Fractured bone     right foot    Nuclear sclerosis of left eye 6/25/2018    Retinal detachment 20 yrs ago     od        Social History     Socioeconomic History    Marital status:      Spouse name: Not on file    Number of children: Not on file    Years of education: Not on file    Highest education level: Not on file   Occupational History    Not on file   Social Needs    Financial resource strain: Not on file    Food insecurity:     Worry: Not on file     Inability: Not on file    Transportation needs:     Medical: Not on file     Non-medical: Not on file   Tobacco Use    Smoking status: Current Every Day Smoker     Packs/day: 1.50     Years: 42.00     Pack years: 63.00     Types: Cigarettes    Smokeless tobacco: Never Used   Substance and Sexual Activity    Alcohol use: No    Drug use: No    Sexual activity: Yes     Partners: Female   Lifestyle    Physical activity:     Days per week: Not on file     Minutes per session: Not on file    Stress: Not on file   Relationships    Social connections:     Talks on phone: Not on file     Gets together: Not on file      Attends Yarsani service: Not on file     Active member of club or organization: Not on file     Attends meetings of clubs or organizations: Not on file     Relationship status: Not on file   Other Topics Concern    Not on file   Social History Narrative    Not on file       Past Surgical History:   Procedure Laterality Date    CATARACT EXTRACTION Left 06/25/2018    Dr. Pa    CATARACT EXTRACTION W/  INTRAOCULAR LENS IMPLANT Right 1999    Dr. Landry    EPIDURAL STEROID INJECTION Left 5/1/2019    Procedure: Axillary, Suprascapular, and Lateral Pectoral Nerve Blocks;  Surgeon: Dameon Austin Jr., MD;  Location: [facility];  Service: Pain Management;  Laterality: Left;  Left Axillary, Suprascapular and Lateral Pectoral Nerve Blocks under Fluoroscopy    80664    Arrive @ 0900; NO Sedation; Confirm with Edmundo; (No DM or anticoag)    EPIDURAL STEROID INJECTION Left 5/16/2019    Procedure: Nerve Radiofrequency Thermocoagulation;  Surgeon: Dameon Austin Jr., MD;  Location: [facility];  Service: Pain Management;  Laterality: Left;  Left Axillary, Suprascapular and Lateral Pectoral Nerve Radiofrequency Ablations    07215    Arrive @ 1100; IV Sedation- Fasting; No Anticoags or DM    EPIDURAL STEROID INJECTION N/A 10/2/2019    Procedure: Injection, Steroid, Epidural Cervical;  Surgeon: Dameon Austin Jr., MD;  Location: [facility];  Service: Pain Management;  Laterality: N/A;  C7-T1 JHOAN    [id number]    Arrive @ 1130; No ATC or DM    EYE SURGERY      INTRAOCULAR PROSTHESES INSERTION Left 6/25/2018    Procedure: INSERTION-INTRAOCULAR LENS (IOL);  Surgeon: Funmilayo Pa MD;  Location: [facility];  Service: Ophthalmology;  Laterality: Left;    LASIK Bilateral 2000    PHACOEMULSIFICATION OF CATARACT Left 6/25/2018    Procedure: PHACOEMULSIFICATION-ASPIRATION-CATARACT;  Surgeon: Funmilayo Pa MD;  Location: [facility] OR;  Service: Ophthalmology;  Laterality: Left;    RETINAL DETACHMENT SURGERY Right 1999    Dr. Landry  "      Review of Systems   Cardiovascular: Negative for chest pain.   Musculoskeletal: Positive for arthralgias, back pain and neck pain. Negative for neck stiffness.   Neurological: Positive for numbness (hands). Negative for tingling.   All other systems reviewed and are negative.      Objective:   BP (!) 154/98 (BP Location: Left arm, Patient Position: Sitting, BP Method: Large (Manual))   Pulse 93   Temp 97.6 °F (36.4 °C) (Oral)   Ht 6' 1" (1.854 m)   Wt (!) 141 kg (310 lb 15.3 oz)   SpO2 98%   BMI 41.03 kg/m²      Physical Exam   Constitutional: He is oriented to person, place, and time. He appears well-developed and well-nourished.   HENT:   Head: Normocephalic and atraumatic.   Neck: Spinous process tenderness present. Decreased range of motion present.   Cardiovascular: Normal rate, regular rhythm and normal heart sounds.   Pulmonary/Chest: Effort normal and breath sounds normal. No respiratory distress. He has no decreased breath sounds.   Musculoskeletal:        Cervical back: He exhibits decreased range of motion and pain.        Right hand: He exhibits bony tenderness.        Left hand: He exhibits bony tenderness.        Right foot: There is bony tenderness.        Left foot: There is bony tenderness.   + pain noted with palpation to the joints of both hands     Neurological: He is alert and oriented to person, place, and time.   Skin: He is not diaphoretic. No pallor.   Psychiatric: He has a normal mood and affect. His speech is normal and behavior is normal.       Assessment:       1. Cervical radiculopathy    2. DDD (degenerative disc disease), cervical    3. Numbness and tingling of both upper extremities    4. Bilateral foot pain    5. Pain in joint involving multiple sites    6. Obesity (BMI 30-39.9)        Plan:       Srinivasan was seen today for hand pain, foot pain and neck pain.    Diagnoses and all orders for this visit:    Cervical radiculopathy  -     methylPREDNISolone (MEDROL DOSEPACK) " 4 mg tablet; use as directed  -     ketorolac injection 60 mg  -     Encouraged to follow up with pain management, orthopedics    DDD (degenerative disc disease), cervical  -     methylPREDNISolone (MEDROL DOSEPACK) 4 mg tablet; use as directed  -     ketorolac injection 60 mg    Numbness and tingling of both upper extremities        -     Continue gabapentin    Bilateral foot pain  -     methylPREDNISolone (MEDROL DOSEPACK) 4 mg tablet; use as directed  -     ketorolac injection 60 mg    Pain in joint involving multiple sites  -     methylPREDNISolone (MEDROL DOSEPACK) 4 mg tablet; use as directed  -     ketorolac injection 60 mg    Obesity (BMI 30-39.9)  -     The patient is asked to make an attempt to improve diet and exercise patterns to aid in medical management of this problem.      Follow up if symptoms worsen or fail to improve.

## 2020-01-23 NOTE — PROGRESS NOTES
Patient tolerate Toradol 60 mg IM injection. Patient advise to wait 15 min after injection  for assessment of any posssible side effects.

## 2020-03-05 ENCOUNTER — LAB VISIT (OUTPATIENT)
Dept: LAB | Facility: HOSPITAL | Age: 60
End: 2020-03-05
Attending: FAMILY MEDICINE
Payer: COMMERCIAL

## 2020-03-05 DIAGNOSIS — Z00.00 GENERAL MEDICAL EXAM: ICD-10-CM

## 2020-03-05 LAB
ALBUMIN SERPL BCP-MCNC: 4 G/DL (ref 3.5–5.2)
ALP SERPL-CCNC: 86 U/L (ref 55–135)
ALT SERPL W/O P-5'-P-CCNC: 26 U/L (ref 10–44)
ANION GAP SERPL CALC-SCNC: 10 MMOL/L (ref 8–16)
AST SERPL-CCNC: 22 U/L (ref 10–40)
BASOPHILS # BLD AUTO: 0.13 K/UL (ref 0–0.2)
BASOPHILS NFR BLD: 1.3 % (ref 0–1.9)
BILIRUB SERPL-MCNC: 0.9 MG/DL (ref 0.1–1)
BUN SERPL-MCNC: 10 MG/DL (ref 6–20)
CALCIUM SERPL-MCNC: 9.5 MG/DL (ref 8.7–10.5)
CHLORIDE SERPL-SCNC: 106 MMOL/L (ref 95–110)
CHOLEST SERPL-MCNC: 204 MG/DL (ref 120–199)
CHOLEST/HDLC SERPL: 4.4 {RATIO} (ref 2–5)
CO2 SERPL-SCNC: 27 MMOL/L (ref 23–29)
CREAT SERPL-MCNC: 0.9 MG/DL (ref 0.5–1.4)
DIFFERENTIAL METHOD: ABNORMAL
EOSINOPHIL # BLD AUTO: 0.1 K/UL (ref 0–0.5)
EOSINOPHIL NFR BLD: 0.8 % (ref 0–8)
ERYTHROCYTE [DISTWIDTH] IN BLOOD BY AUTOMATED COUNT: 13.2 % (ref 11.5–14.5)
EST. GFR  (AFRICAN AMERICAN): >60 ML/MIN/1.73 M^2
EST. GFR  (NON AFRICAN AMERICAN): >60 ML/MIN/1.73 M^2
ESTIMATED AVG GLUCOSE: 128 MG/DL (ref 68–131)
GLUCOSE SERPL-MCNC: 128 MG/DL (ref 70–110)
HBA1C MFR BLD HPLC: 6.1 % (ref 4–5.6)
HCT VFR BLD AUTO: 49.3 % (ref 40–54)
HDLC SERPL-MCNC: 46 MG/DL (ref 40–75)
HDLC SERPL: 22.5 % (ref 20–50)
HGB BLD-MCNC: 15.2 G/DL (ref 14–18)
IMM GRANULOCYTES # BLD AUTO: 0.05 K/UL (ref 0–0.04)
IMM GRANULOCYTES NFR BLD AUTO: 0.5 % (ref 0–0.5)
LDLC SERPL CALC-MCNC: 130.4 MG/DL (ref 63–159)
LYMPHOCYTES # BLD AUTO: 2.6 K/UL (ref 1–4.8)
LYMPHOCYTES NFR BLD: 26.2 % (ref 18–48)
MCH RBC QN AUTO: 29.2 PG (ref 27–31)
MCHC RBC AUTO-ENTMCNC: 30.8 G/DL (ref 32–36)
MCV RBC AUTO: 95 FL (ref 82–98)
MONOCYTES # BLD AUTO: 0.9 K/UL (ref 0.3–1)
MONOCYTES NFR BLD: 9.5 % (ref 4–15)
NEUTROPHILS # BLD AUTO: 6.1 K/UL (ref 1.8–7.7)
NEUTROPHILS NFR BLD: 61.7 % (ref 38–73)
NONHDLC SERPL-MCNC: 158 MG/DL
NRBC BLD-RTO: 0 /100 WBC
PLATELET # BLD AUTO: 376 K/UL (ref 150–350)
PMV BLD AUTO: 9.7 FL (ref 9.2–12.9)
POTASSIUM SERPL-SCNC: 4.2 MMOL/L (ref 3.5–5.1)
PROT SERPL-MCNC: 7 G/DL (ref 6–8.4)
RBC # BLD AUTO: 5.21 M/UL (ref 4.6–6.2)
SODIUM SERPL-SCNC: 143 MMOL/L (ref 136–145)
TRIGL SERPL-MCNC: 138 MG/DL (ref 30–150)
TSH SERPL DL<=0.005 MIU/L-ACNC: 1.65 UIU/ML (ref 0.4–4)
WBC # BLD AUTO: 9.92 K/UL (ref 3.9–12.7)

## 2020-03-05 PROCEDURE — 84443 ASSAY THYROID STIM HORMONE: CPT

## 2020-03-05 PROCEDURE — 80053 COMPREHEN METABOLIC PANEL: CPT

## 2020-03-05 PROCEDURE — 36415 COLL VENOUS BLD VENIPUNCTURE: CPT | Mod: PO

## 2020-03-05 PROCEDURE — 83036 HEMOGLOBIN GLYCOSYLATED A1C: CPT

## 2020-03-05 PROCEDURE — 80061 LIPID PANEL: CPT

## 2020-03-05 PROCEDURE — 85025 COMPLETE CBC W/AUTO DIFF WBC: CPT

## 2020-03-10 ENCOUNTER — HOSPITAL ENCOUNTER (OUTPATIENT)
Dept: RADIOLOGY | Facility: HOSPITAL | Age: 60
Discharge: HOME | End: 2020-03-10
Attending: FAMILY MEDICINE
Payer: COMMERCIAL

## 2020-03-10 ENCOUNTER — OFFICE VISIT (OUTPATIENT)
Dept: FAMILY MEDICINE | Facility: CLINIC | Age: 60
End: 2020-03-10
Payer: COMMERCIAL

## 2020-03-10 VITALS
OXYGEN SATURATION: 97 % | DIASTOLIC BLOOD PRESSURE: 80 MMHG | HEART RATE: 74 BPM | TEMPERATURE: 97 F | HEIGHT: 73 IN | SYSTOLIC BLOOD PRESSURE: 130 MMHG | WEIGHT: 314.13 LBS | BODY MASS INDEX: 41.63 KG/M2

## 2020-03-10 DIAGNOSIS — M54.2 CERVICAL PAIN: ICD-10-CM

## 2020-03-10 DIAGNOSIS — M54.12 CERVICAL RADICULOPATHY: ICD-10-CM

## 2020-03-10 DIAGNOSIS — R20.0 NUMBNESS AND TINGLING OF BOTH UPPER EXTREMITIES: ICD-10-CM

## 2020-03-10 DIAGNOSIS — E66.01 MORBID OBESITY WITH BMI OF 40.0-44.9, ADULT: ICD-10-CM

## 2020-03-10 DIAGNOSIS — M50.30 DDD (DEGENERATIVE DISC DISEASE), CERVICAL: ICD-10-CM

## 2020-03-10 DIAGNOSIS — Z00.00 ANNUAL PHYSICAL EXAM: Primary | ICD-10-CM

## 2020-03-10 DIAGNOSIS — M25.512 CHRONIC LEFT SHOULDER PAIN: ICD-10-CM

## 2020-03-10 DIAGNOSIS — G89.29 CHRONIC PAIN OF LEFT ANKLE: Chronic | ICD-10-CM

## 2020-03-10 DIAGNOSIS — R20.2 NUMBNESS AND TINGLING OF BOTH UPPER EXTREMITIES: ICD-10-CM

## 2020-03-10 DIAGNOSIS — M25.572 CHRONIC PAIN OF LEFT ANKLE: Chronic | ICD-10-CM

## 2020-03-10 DIAGNOSIS — G89.29 CHRONIC LEFT SHOULDER PAIN: ICD-10-CM

## 2020-03-10 PROCEDURE — 72040 X-RAY EXAM NECK SPINE 2-3 VW: CPT | Mod: TC,FY,PO

## 2020-03-10 PROCEDURE — 99396 PREV VISIT EST AGE 40-64: CPT | Mod: S$GLB,,, | Performed by: FAMILY MEDICINE

## 2020-03-10 PROCEDURE — 99999 PR PBB SHADOW E&M-EST. PATIENT-LVL IV: CPT | Mod: PBBFAC,,, | Performed by: FAMILY MEDICINE

## 2020-03-10 PROCEDURE — 72040 XR CERVICAL SPINE AP LATERAL: ICD-10-PCS | Mod: 26,,, | Performed by: RADIOLOGY

## 2020-03-10 PROCEDURE — 72040 X-RAY EXAM NECK SPINE 2-3 VW: CPT | Mod: 26,,, | Performed by: RADIOLOGY

## 2020-03-10 PROCEDURE — 99999 PR PBB SHADOW E&M-EST. PATIENT-LVL IV: ICD-10-PCS | Mod: PBBFAC,,, | Performed by: FAMILY MEDICINE

## 2020-03-10 PROCEDURE — 99396 PR PREVENTIVE VISIT,EST,40-64: ICD-10-PCS | Mod: S$GLB,,, | Performed by: FAMILY MEDICINE

## 2020-03-10 RX ORDER — CELECOXIB 200 MG/1
200 CAPSULE ORAL 2 TIMES DAILY
Qty: 180 CAPSULE | Refills: 0 | Status: SHIPPED | OUTPATIENT
Start: 2020-03-10 | End: 2020-06-19 | Stop reason: SDUPTHER

## 2020-03-10 RX ORDER — TIZANIDINE 4 MG/1
4 TABLET ORAL EVERY 8 HOURS PRN
Qty: 90 TABLET | Refills: 1 | Status: SHIPPED | OUTPATIENT
Start: 2020-03-10 | End: 2020-03-20

## 2020-03-10 NOTE — PROGRESS NOTES
Routine Office Visit    Patient Name: Srinivasan Munoz    : 1960  MRN: [id number]    Subjective:  Srinivasan is a 60 y.o. male who presents today for     1. Annual physical   2. Neck and shoulder pain - chronic condition for patient. Pt is following up with ortho. Pt c/o severe pain. He states he was advised he may need a shoulder replacement. He continues to have pain in his neck.  Pain is associated with arm stiffness and pain. He states he feels that his knuckles are caving in and cramping. He was previously on hydrocodone twice a day which did help to alleviate pain. He also has chronic right ankle pain from previous fracture many years ago.     Review of Systems   Constitutional: Negative for chills and fever.   HENT: Negative for congestion.    Eyes: Negative for blurred vision.   Respiratory: Negative for cough.    Cardiovascular: Negative for chest pain.   Gastrointestinal: Negative for abdominal pain, constipation, diarrhea, heartburn, nausea and vomiting.   Genitourinary: Negative for dysuria.   Musculoskeletal: Negative for myalgias.   Skin: Negative for itching and rash.   Neurological: Negative for dizziness and headaches.   Psychiatric/Behavioral: Negative for depression.       Active Problem List  Patient Active Problem List   Diagnosis    Morbid obesity with BMI of 40.0-44.9, adult    Chronic pain of right heel    Chronic pain of left ankle    DDD (degenerative disc disease), cervical    Nuclear sclerosis of left eye    Cervical radiculopathy    Osteoarthritis of spine with radiculopathy, cervical region    Chronic left shoulder pain    Glenohumeral arthritis, left    Acute pain of left shoulder    Cervical pain    Neck stiffness    Numbness and tingling of both upper extremities    Left shoulder pain       Past Surgical History  Past Surgical History:   Procedure Laterality Date    CATARACT EXTRACTION Left 2018    Dr. Pa    CATARACT EXTRACTION W/  INTRAOCULAR LENS IMPLANT  Right 1999    Dr. Landry    EPIDURAL STEROID INJECTION Left 5/1/2019    Procedure: Axillary, Suprascapular, and Lateral Pectoral Nerve Blocks;  Surgeon: Dameon Austin Jr., MD;  Location: [facility];  Service: Pain Management;  Laterality: Left;  Left Axillary, Suprascapular and Lateral Pectoral Nerve Blocks under Fluoroscopy    [id number]    Arrive @ 0900; NO Sedation; Confirm with Edmundo; (No DM or anticoag)    EPIDURAL STEROID INJECTION Left 5/16/2019    Procedure: Nerve Radiofrequency Thermocoagulation;  Surgeon: Dameon Austin Jr., MD;  Location: [facility];  Service: Pain Management;  Laterality: Left;  Left Axillary, Suprascapular and Lateral Pectoral Nerve Radiofrequency Ablations    87871    Arrive @ 1100; IV Sedation- Fasting; No Anticoags or DM    EPIDURAL STEROID INJECTION N/A 10/2/2019    Procedure: Injection, Steroid, Epidural Cervical;  Surgeon: Dameon Austin Jr., MD;  Location: [facility];  Service: Pain Management;  Laterality: N/A;  C7-T1 JHOAN    [id number]    Arrive @ 1130; No ATC or DM    EYE SURGERY      INTRAOCULAR PROSTHESES INSERTION Left 6/25/2018    Procedure: INSERTION-INTRAOCULAR LENS (IOL);  Surgeon: Funmilayo Pa MD;  Location: [facility];  Service: Ophthalmology;  Laterality: Left;    LASIK Bilateral 2000    PHACOEMULSIFICATION OF CATARACT Left 6/25/2018    Procedure: PHACOEMULSIFICATION-ASPIRATION-CATARACT;  Surgeon: Funmilayo Pa MD;  Location: [facility];  Service: Ophthalmology;  Laterality: Left;    RETINAL DETACHMENT SURGERY Right 1999    Dr. Landry       Family History  Family History   Problem Relation Age of Onset    Heart disease Father     Hypertension Father     Diabetes Mother     Stroke Mother     Cancer Mother     Hypertension Mother     Stroke Brother     Diabetes Brother     No Known Problems Sister     No Known Problems Maternal Aunt     No Known Problems Maternal Uncle     No Known Problems Paternal Aunt     No Known Problems Paternal Uncle     No Known  Problems Maternal Grandmother     No Known Problems Maternal Grandfather     No Known Problems Paternal Grandmother     No Known Problems Paternal Grandfather     Amblyopia Neg Hx     Blindness Neg Hx     Cataracts Neg Hx     Glaucoma Neg Hx     Macular degeneration Neg Hx     Retinal detachment Neg Hx     Strabismus Neg Hx     Thyroid disease Neg Hx        Social History  Social History     Socioeconomic History    Marital status:      Spouse name: Not on file    Number of children: Not on file    Years of education: Not on file    Highest education level: Not on file   Occupational History    Not on file   Social Needs    Financial resource strain: Not on file    Food insecurity:     Worry: Not on file     Inability: Not on file    Transportation needs:     Medical: Not on file     Non-medical: Not on file   Tobacco Use    Smoking status: Current Every Day Smoker     Packs/day: 1.50     Years: 42.00     Pack years: 63.00     Types: Cigarettes    Smokeless tobacco: Never Used   Substance and Sexual Activity    Alcohol use: No    Drug use: No    Sexual activity: Yes     Partners: Female   Lifestyle    Physical activity:     Days per week: Not on file     Minutes per session: Not on file    Stress: Not on file   Relationships    Social connections:     Talks on phone: Not on file     Gets together: Not on file     Attends Yazidism service: Not on file     Active member of club or organization: Not on file     Attends meetings of clubs or organizations: Not on file     Relationship status: Not on file   Other Topics Concern    Not on file   Social History Narrative    Not on file       Medications and Allergies  Reviewed and updated.   Current Outpatient Medications   Medication Sig    gabapentin (NEURONTIN) 800 MG tablet Take 1 tablet (800 mg total) by mouth 2 (two) times daily.    celecoxib (CELEBREX) 200 MG capsule Take 1 capsule (200 mg total) by mouth 2 (two) times daily.  "   DIPH,PERTUSS,ACEL,,TET VAC,PF, ADULT (ADACEL,TDAP ADOLESN/ADULT,,PF,) 2 Lf-(2.5-5-3-5 mcg)-5Lf/0.5 mL Syrg Adacel (Tdap Adolesn/Adult)(PF)2 Lf-(2.5-5-3-5)-5 Lf/0.5 mL IM syringe    DIPH,PERTUSS,ACEL,,TET VAC,PF, ADULT (ADACEL,TDAP ADOLESN/ADULT,,PF,) 2 Lf-(2.5-5-3-5 mcg)-5Lf/0.5 mL Syrg Adacel (Tdap Adolesn/Adult)(PF)2 Lf-(2.5-5-3-5)-5 Lf/0.5 mL IM syringe    FLUZONE QUAD 3939-1763, PF, 60 mcg (15 mcg x 4)/0.5 mL Syrg PHARMACIST ADMINISTERED IMMUNIZATION ADMINISTERED AT TIME OF DISPENSING    tiZANidine (ZANAFLEX) 4 MG tablet Take 1 tablet (4 mg total) by mouth every 8 (eight) hours as needed.     No current facility-administered medications for this visit.        Physical Exam  /80 (BP Location: Left arm, Patient Position: Sitting, BP Method: Large (Manual))   Pulse 74   Temp 97.4 °F (36.3 °C) (Oral)   Ht 6' 1" (1.854 m)   Wt (!) 142.5 kg (314 lb 2.5 oz)   SpO2 97%   BMI 41.45 kg/m²   Physical Exam   Constitutional: He is oriented to person, place, and time. He appears well-developed and well-nourished.   HENT:   Head: Normocephalic and atraumatic.   Eyes: Pupils are equal, round, and reactive to light. Conjunctivae and EOM are normal.   Neck: Normal range of motion. Neck supple. No JVD present. No thyromegaly present.   Cardiovascular: Normal rate, regular rhythm and normal heart sounds.   Pulmonary/Chest: Effort normal and breath sounds normal. He has no wheezes.   Abdominal: Soft. Bowel sounds are normal. He exhibits no distension. There is no tenderness. There is no guarding.   Musculoskeletal: Normal range of motion.   Lymphadenopathy:     He has no cervical adenopathy.   Neurological: He is alert and oriented to person, place, and time.   Skin: Skin is warm and dry.   Psychiatric: He has a normal mood and affect. His behavior is normal.         Assessment/Plan:  Srinivasan Munoz is a 60 y.o. male who presents today for :    Problem List Items Addressed This Visit        Neuro    Cervical " radiculopathy    Relevant Medications    celecoxib (CELEBREX) 200 MG capsule    tiZANidine (ZANAFLEX) 4 MG tablet  Common side effects of this medication were discussed with the patient. Questions regarding medications were discussed during this visit.       Other Relevant Orders    X-Ray Cervical Spine AP And Lateral    Ambulatory referral/consult to Neurosurgery    DDD (degenerative disc disease), cervical    Overview     With radiculopathy  Pt previously on gabapentin; would like to restart            Endocrine    Morbid obesity with BMI of 40.0-44.9, adult  The patient is asked to make an attempt to improve diet and exercise patterns to aid in medical management of this problem.  Scheduled to see me for weight loss in a few days        Orthopedic    Cervical pain    Chronic left shoulder pain    Chronic pain of left ankle (Chronic)    Overview     Hx of fracture  Followed by ortho - recommended surgery  Conservative treatment     Start celebrex   Recommend continue f/u with ortho             Other    Numbness and tingling of both upper extremities  Noted in chart  X-ray ordered   Referral to neurosurgery placed      Other Visit Diagnoses     Annual physical exam    -  Primary  Health Maintenance       Date Due Completion Date    HIV Screening 02/19/1975 ---    Pneumococcal Vaccine (Medium Risk) (1 of 1 - PPSV23) 02/19/1979 ---    Colonoscopy 02/19/2010 ---    LDCT Lung Screen 02/19/2015 ---    Lipid Panel 03/05/2025 3/5/2020    TETANUS VACCINE 10/24/2027 10/24/2017        I addressed all major concerns as it related to health maintenance.  All were ordered and scheduled based on the patients wishes.  Any additional health maintenance will be readdressed at the next physical if declined or deferred by the patient.              No follow-ups on file.

## 2020-03-12 ENCOUNTER — OFFICE VISIT (OUTPATIENT)
Dept: FAMILY MEDICINE | Facility: CLINIC | Age: 60
End: 2020-03-12
Payer: COMMERCIAL

## 2020-03-12 VITALS
TEMPERATURE: 98 F | DIASTOLIC BLOOD PRESSURE: 80 MMHG | OXYGEN SATURATION: 97 % | SYSTOLIC BLOOD PRESSURE: 138 MMHG | HEIGHT: 73 IN | HEART RATE: 74 BPM | WEIGHT: 314.13 LBS | BODY MASS INDEX: 41.63 KG/M2

## 2020-03-12 DIAGNOSIS — E66.01 MORBID OBESITY WITH BMI OF 40.0-44.9, ADULT: ICD-10-CM

## 2020-03-12 DIAGNOSIS — R06.83 SNORING: Primary | ICD-10-CM

## 2020-03-12 PROCEDURE — 99999 PR PBB SHADOW E&M-EST. PATIENT-LVL IV: ICD-10-PCS | Mod: PBBFAC,,, | Performed by: FAMILY MEDICINE

## 2020-03-12 PROCEDURE — 99215 OFFICE O/P EST HI 40 MIN: CPT | Mod: 25,S$GLB,, | Performed by: FAMILY MEDICINE

## 2020-03-12 PROCEDURE — 99999 PR PBB SHADOW E&M-EST. PATIENT-LVL IV: CPT | Mod: PBBFAC,,, | Performed by: FAMILY MEDICINE

## 2020-03-12 PROCEDURE — 99215 PR OFFICE/OUTPT VISIT, EST, LEVL V, 40-54 MIN: ICD-10-PCS | Mod: 25,S$GLB,, | Performed by: FAMILY MEDICINE

## 2020-03-12 PROCEDURE — 3008F PR BODY MASS INDEX (BMI) DOCUMENTED: ICD-10-PCS | Mod: CPTII,S$GLB,, | Performed by: FAMILY MEDICINE

## 2020-03-12 PROCEDURE — 3008F BODY MASS INDEX DOCD: CPT | Mod: CPTII,S$GLB,, | Performed by: FAMILY MEDICINE

## 2020-03-12 NOTE — PROGRESS NOTES
Routine Office Visit    Patient Name: Srinivasan Munoz    : 1960  MRN: [id number]    Subjective:  Srinivasan is a 60 y.o. male who presents today for     1. Weight management        ROS    Active Problem List  Patient Active Problem List   Diagnosis    Morbid obesity with BMI of 40.0-44.9, adult    Chronic pain of right heel    Chronic pain of left ankle    DDD (degenerative disc disease), cervical    Nuclear sclerosis of left eye    Cervical radiculopathy    Osteoarthritis of spine with radiculopathy, cervical region    Chronic left shoulder pain    Glenohumeral arthritis, left    Acute pain of left shoulder    Cervical pain    Neck stiffness    Numbness and tingling of both upper extremities    Left shoulder pain       Past Surgical History  Past Surgical History:   Procedure Laterality Date    CATARACT EXTRACTION Left 2018    Dr. Pa    CATARACT EXTRACTION W/  INTRAOCULAR LENS IMPLANT Right     Dr. Landry    EPIDURAL STEROID INJECTION Left 2019    Procedure: Axillary, Suprascapular, and Lateral Pectoral Nerve Blocks;  Surgeon: Dameon Austin Jr., MD;  Location: [facility];  Service: Pain Management;  Laterality: Left;  Left Axillary, Suprascapular and Lateral Pectoral Nerve Blocks under Fluoroscopy    51652    Arrive @ 0900; NO Sedation; Confirm with Edmundo; (No DM or anticoag)    EPIDURAL STEROID INJECTION Left 2019    Procedure: Nerve Radiofrequency Thermocoagulation;  Surgeon: Dameon Austin Jr., MD;  Location: [facility];  Service: Pain Management;  Laterality: Left;  Left Axillary, Suprascapular and Lateral Pectoral Nerve Radiofrequency Ablations    16375    Arrive @ 1100; IV Sedation- Fasting; No Anticoags or DM    EPIDURAL STEROID INJECTION N/A 10/2/2019    Procedure: Injection, Steroid, Epidural Cervical;  Surgeon: Dameon Austin Jr., MD;  Location: [facility];  Service: Pain Management;  Laterality: N/A;  C7-T1 JHOAN    33430    Arrive @ 1130; No ATC or DM     EYE SURGERY      INTRAOCULAR PROSTHESES INSERTION Left 6/25/2018    Procedure: INSERTION-INTRAOCULAR LENS (IOL);  Surgeon: Funmilayo Pa MD;  Location: [facility];  Service: Ophthalmology;  Laterality: Left;    LASIK Bilateral 2000    PHACOEMULSIFICATION OF CATARACT Left 6/25/2018    Procedure: PHACOEMULSIFICATION-ASPIRATION-CATARACT;  Surgeon: Funmilayo Pa MD;  Location: [facility];  Service: Ophthalmology;  Laterality: Left;    RETINAL DETACHMENT SURGERY Right 1999    Dr. Landry       Family History  Family History   Problem Relation Age of Onset    Heart disease Father     Hypertension Father     Diabetes Mother     Stroke Mother     Cancer Mother     Hypertension Mother     Stroke Brother     Diabetes Brother     No Known Problems Sister     No Known Problems Maternal Aunt     No Known Problems Maternal Uncle     No Known Problems Paternal Aunt     No Known Problems Paternal Uncle     No Known Problems Maternal Grandmother     No Known Problems Maternal Grandfather     No Known Problems Paternal Grandmother     No Known Problems Paternal Grandfather     Amblyopia Neg Hx     Blindness Neg Hx     Cataracts Neg Hx     Glaucoma Neg Hx     Macular degeneration Neg Hx     Retinal detachment Neg Hx     Strabismus Neg Hx     Thyroid disease Neg Hx        Social History  Social History     Socioeconomic History    Marital status:      Spouse name: Not on file    Number of children: Not on file    Years of education: Not on file    Highest education level: Not on file   Occupational History    Not on file   Social Needs    Financial resource strain: Not on file    Food insecurity:     Worry: Not on file     Inability: Not on file    Transportation needs:     Medical: Not on file     Non-medical: Not on file   Tobacco Use    Smoking status: Current Every Day Smoker     Packs/day: 1.50     Years: 42.00     Pack years: 63.00     Types: Cigarettes    Smokeless tobacco: Never Used  "  Substance and Sexual Activity    Alcohol use: No    Drug use: No    Sexual activity: Yes     Partners: Female   Lifestyle    Physical activity:     Days per week: Not on file     Minutes per session: Not on file    Stress: Only a little   Relationships    Social connections:     Talks on phone: Not on file     Gets together: Not on file     Attends Amish service: Not on file     Active member of club or organization: Not on file     Attends meetings of clubs or organizations: Not on file     Relationship status: Not on file   Other Topics Concern    Not on file   Social History Narrative    Not on file       Medications and Allergies  Reviewed and updated.   Current Outpatient Medications   Medication Sig    celecoxib (CELEBREX) 200 MG capsule Take 1 capsule (200 mg total) by mouth 2 (two) times daily.    DIPH,PERTUSS,ACEL,,TET VAC,PF, ADULT (ADACEL,TDAP ADOLESN/ADULT,,PF,) 2 Lf-(2.5-5-3-5 mcg)-5Lf/0.5 mL Syrg Adacel (Tdap Adolesn/Adult)(PF)2 Lf-(2.5-5-3-5)-5 Lf/0.5 mL IM syringe    DIPH,PERTUSS,ACEL,,TET VAC,PF, ADULT (ADACEL,TDAP ADOLESN/ADULT,,PF,) 2 Lf-(2.5-5-3-5 mcg)-5Lf/0.5 mL Syrg Adacel (Tdap Adolesn/Adult)(PF)2 Lf-(2.5-5-3-5)-5 Lf/0.5 mL IM syringe    FLUZONE QUAD [id number], PF, 60 mcg (15 mcg x 4)/0.5 mL Syrg PHARMACIST ADMINISTERED IMMUNIZATION ADMINISTERED AT TIME OF DISPENSING    gabapentin (NEURONTIN) 800 MG tablet Take 1 tablet (800 mg total) by mouth 2 (two) times daily.    tiZANidine (ZANAFLEX) 4 MG tablet Take 1 tablet (4 mg total) by mouth every 8 (eight) hours as needed.     No current facility-administered medications for this visit.        Physical Exam  /80 (BP Location: Left arm, Patient Position: Sitting, BP Method: Large (Manual))   Pulse 74   Temp 97.8 °F (36.6 °C) (Oral)   Ht 6' 1" (1.854 m)   Wt (!) 142.5 kg (314 lb 2.5 oz)   SpO2 97%   BMI 41.45 kg/m²   Physical Exam      Assessment/Plan:  Srinivasan Munoz is a 60 y.o. male who presents today for " :    Problem List Items Addressed This Visit     None            No follow-ups on file.

## 2020-03-12 NOTE — PROGRESS NOTES
Routine Office Visit    Patient Name: Srinivasan Munoz    : 1960  MRN: [id number]    Subjective:  Srinivasan is a 60 y.o. male who presents today for     1. Weight management - Patient has been struggling with his weight for 6-7 years but worse in the past 2-3 years. Patient feels his struggles include eating irregularly. Patient sometimes skips meals. Patient likes to eat peanut butter and chocolate late at night.     Current weight 314  Starting weight 314  Goal weight 250    Diets tried -   Low carbohydrate - decreased weight but salads cause diarrhea.     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     24 hour recall   Breakfast - skipped  Snacks - peanut butter and chocolate - ~6 tablespoonful and jose martin  Dinner - skipped  Snack none  Lunch - fish sandwich from [employer] and fries. No soda, energy drink - monster sugar free   Breakfast - skipped. Energy drinks.       Review of Systems   Constitutional: Negative for chills and fever.   HENT: Negative for congestion.    Eyes: Negative for blurred vision.   Respiratory: Negative for cough.    Cardiovascular: Negative for chest pain.   Gastrointestinal: Negative for abdominal pain, constipation, diarrhea, heartburn, nausea and vomiting.   Genitourinary: Negative for dysuria.   Musculoskeletal: Negative for myalgias.   Skin: Negative for itching and rash.   Neurological: Negative for dizziness and headaches.   Psychiatric/Behavioral: Negative for depression.       Active Problem List  Patient Active Problem List   Diagnosis    Morbid obesity with BMI of 40.0-44.9, adult    Chronic pain of right heel    Chronic pain of left ankle    DDD (degenerative disc disease), cervical    Nuclear sclerosis of left eye    Cervical radiculopathy    Osteoarthritis of spine with radiculopathy, cervical region    Chronic left shoulder pain    Glenohumeral arthritis, left    Acute  pain of left shoulder    Cervical pain    Neck stiffness    Numbness and tingling of both upper extremities    Left shoulder pain       Past Surgical History  Past Surgical History:   Procedure Laterality Date    CATARACT EXTRACTION Left 06/25/2018    Dr. Pa    CATARACT EXTRACTION W/  INTRAOCULAR LENS IMPLANT Right 1999    Dr. Landry    EPIDURAL STEROID INJECTION Left 5/1/2019    Procedure: Axillary, Suprascapular, and Lateral Pectoral Nerve Blocks;  Surgeon: Dameon Austin Jr., MD;  Location: [facility] ENDO;  Service: Pain Management;  Laterality: Left;  Left Axillary, Suprascapular and Lateral Pectoral Nerve Blocks under Fluoroscopy    71188    Arrive @ 0900; NO Sedation; Confirm with Edmundo; (No DM or anticoag)    EPIDURAL STEROID INJECTION Left 5/16/2019    Procedure: Nerve Radiofrequency Thermocoagulation;  Surgeon: Dameon Austin Jr., MD;  Location: [facility] ENDO;  Service: Pain Management;  Laterality: Left;  Left Axillary, Suprascapular and Lateral Pectoral Nerve Radiofrequency Ablations    01456    Arrive @ 1100; IV Sedation- Fasting; No Anticoags or DM    EPIDURAL STEROID INJECTION N/A 10/2/2019    Procedure: Injection, Steroid, Epidural Cervical;  Surgeon: Dameon Austin Jr., MD;  Location: [facility] ENDO;  Service: Pain Management;  Laterality: N/A;  C7-T1 JHOAN    31071    Arrive @ 1130; No ATC or DM    EYE SURGERY      INTRAOCULAR PROSTHESES INSERTION Left 6/25/2018    Procedure: INSERTION-INTRAOCULAR LENS (IOL);  Surgeon: Funmilayo Pa MD;  Location: [facility];  Service: Ophthalmology;  Laterality: Left;    LASIK Bilateral 2000    PHACOEMULSIFICATION OF CATARACT Left 6/25/2018    Procedure: PHACOEMULSIFICATION-ASPIRATION-CATARACT;  Surgeon: Funmilayo Pa MD;  Location: [facility];  Service: Ophthalmology;  Laterality: Left;    RETINAL DETACHMENT SURGERY Right 1999    Dr. Landry       Family History  Family History   Problem Relation Age of Onset    Heart disease Father     Hypertension Father      Diabetes Mother     Stroke Mother     Cancer Mother     Hypertension Mother     Stroke Brother     Diabetes Brother     No Known Problems Sister     No Known Problems Maternal Aunt     No Known Problems Maternal Uncle     No Known Problems Paternal Aunt     No Known Problems Paternal Uncle     No Known Problems Maternal Grandmother     No Known Problems Maternal Grandfather     No Known Problems Paternal Grandmother     No Known Problems Paternal Grandfather     Amblyopia Neg Hx     Blindness Neg Hx     Cataracts Neg Hx     Glaucoma Neg Hx     Macular degeneration Neg Hx     Retinal detachment Neg Hx     Strabismus Neg Hx     Thyroid disease Neg Hx        Social History  Social History     Socioeconomic History    Marital status:      Spouse name: Not on file    Number of children: Not on file    Years of education: Not on file    Highest education level: Not on file   Occupational History    Not on file   Social Needs    Financial resource strain: Not on file    Food insecurity:     Worry: Not on file     Inability: Not on file    Transportation needs:     Medical: Not on file     Non-medical: Not on file   Tobacco Use    Smoking status: Current Every Day Smoker     Packs/day: 1.50     Years: 42.00     Pack years: 63.00     Types: Cigarettes    Smokeless tobacco: Never Used   Substance and Sexual Activity    Alcohol use: No    Drug use: No    Sexual activity: Yes     Partners: Female   Lifestyle    Physical activity:     Days per week: Not on file     Minutes per session: Not on file    Stress: Only a little   Relationships    Social connections:     Talks on phone: Not on file     Gets together: Not on file     Attends [employer] service: Not on file     Active member of club or organization: Not on file     Attends meetings of clubs or organizations: Not on file     Relationship status: Not on file   Other Topics Concern    Not on file   Social History Narrative  "   Not on file       Medications and Allergies  Reviewed and updated.   Current Outpatient Medications   Medication Sig    celecoxib (CELEBREX) 200 MG capsule Take 1 capsule (200 mg total) by mouth 2 (two) times daily.    DIPH,PERTUSS,ACEL,,TET VAC,PF, ADULT (ADACEL,TDAP ADOLESN/ADULT,,PF,) 2 Lf-(2.5-5-3-5 mcg)-5Lf/0.5 mL Syrg Adacel (Tdap Adolesn/Adult)(PF)2 Lf-(2.5-5-3-5)-5 Lf/0.5 mL IM syringe    DIPH,PERTUSS,ACEL,,TET VAC,PF, ADULT (ADACEL,TDAP ADOLESN/ADULT,,PF,) 2 Lf-(2.5-5-3-5 mcg)-5Lf/0.5 mL Syrg Adacel (Tdap Adolesn/Adult)(PF)2 Lf-(2.5-5-3-5)-5 Lf/0.5 mL IM syringe    FLUZONE QUAD 8902-0713, PF, 60 mcg (15 mcg x 4)/0.5 mL Syrg PHARMACIST ADMINISTERED IMMUNIZATION ADMINISTERED AT TIME OF DISPENSING    gabapentin (NEURONTIN) 800 MG tablet Take 1 tablet (800 mg total) by mouth 2 (two) times daily.    tiZANidine (ZANAFLEX) 4 MG tablet Take 1 tablet (4 mg total) by mouth every 8 (eight) hours as needed.     No current facility-administered medications for this visit.        Physical Exam  /80 (BP Location: Left arm, Patient Position: Sitting, BP Method: Large (Manual))   Pulse 74   Temp 97.8 °F (36.6 °C) (Oral)   Ht 6' 1" (1.854 m)   Wt (!) 142.5 kg (314 lb 2.5 oz)   SpO2 97%   BMI 41.45 kg/m²   Physical Exam   Constitutional: He is oriented to person, place, and time. He appears well-developed and well-nourished.   HENT:   Head: Normocephalic and atraumatic.   Eyes: Pupils are equal, round, and reactive to light. Conjunctivae and EOM are normal.   Neck: Normal range of motion. Neck supple. No JVD present. No thyromegaly present.   Cardiovascular: Normal rate, regular rhythm and normal heart sounds.   Pulmonary/Chest: Effort normal and breath sounds normal. He has no wheezes.   Abdominal: Soft. Bowel sounds are normal. He exhibits no distension. There is no tenderness. There is no guarding.   Musculoskeletal: Normal range of motion.   Lymphadenopathy:     He has no cervical adenopathy. "   Neurological: He is alert and oriented to person, place, and time.   Skin: Skin is warm and dry.   Psychiatric: He has a normal mood and affect. His behavior is normal.          Assessment/Plan:  Srinivasan Munoz is a 60 y.o. male who presents today for :    Problem List Items Addressed This Visit        Endocrine    Morbid obesity with BMI of 40.0-44.9, adult    Overview     The patient is asked to make an attempt to improve diet and exercise patterns to aid in medical management of this problem.  Recommend to stop peanut butter and chocolate   Recommend to eat portion controlled foods   Avoid processed carbohydrates  Eat whole foods   Increase sleep   Diet and exercise planning discussed.             Other Visit Diagnoses     Snoring    -  Primary    Relevant Orders    Ambulatory referral/consult to Sleep Disorders  S -  Patient snores loudly per patient wife  T - Patient is tired, especially after work   O - no observed apenic epsidoes   P - no treatment for BP   B - BMI> 40  A - age < 50  N - neck circumference - not measured.   G - Male   5 points - High risk - recommend sleep study             Greater than 45 minutes was spent with this patient with greater than 50% spent with face-to-face counseling    Follow up in about 2 months (around 5/12/2020).

## 2020-03-23 ENCOUNTER — TELEPHONE (OUTPATIENT)
Dept: FAMILY MEDICINE | Facility: CLINIC | Age: 60
End: 2020-03-23

## 2020-03-25 ENCOUNTER — TELEPHONE (OUTPATIENT)
Dept: FAMILY MEDICINE | Facility: CLINIC | Age: 60
End: 2020-03-25

## 2020-06-19 ENCOUNTER — OFFICE VISIT (OUTPATIENT)
Dept: FAMILY MEDICINE | Facility: CLINIC | Age: 60
End: 2020-06-19
Payer: COMMERCIAL

## 2020-06-19 VITALS
HEART RATE: 74 BPM | DIASTOLIC BLOOD PRESSURE: 80 MMHG | TEMPERATURE: 98 F | HEIGHT: 73 IN | WEIGHT: 305.75 LBS | OXYGEN SATURATION: 98 % | SYSTOLIC BLOOD PRESSURE: 132 MMHG | BODY MASS INDEX: 40.52 KG/M2

## 2020-06-19 DIAGNOSIS — M54.2 CERVICAL PAIN: ICD-10-CM

## 2020-06-19 DIAGNOSIS — M50.30 DDD (DEGENERATIVE DISC DISEASE), CERVICAL: ICD-10-CM

## 2020-06-19 DIAGNOSIS — M50.30 DEGENERATIVE CERVICAL DISC: Primary | ICD-10-CM

## 2020-06-19 DIAGNOSIS — E66.01 MORBID OBESITY WITH BMI OF 40.0-44.9, ADULT: ICD-10-CM

## 2020-06-19 DIAGNOSIS — M25.512 CHRONIC LEFT SHOULDER PAIN: ICD-10-CM

## 2020-06-19 DIAGNOSIS — G89.29 CHRONIC LEFT SHOULDER PAIN: ICD-10-CM

## 2020-06-19 DIAGNOSIS — M54.12 CERVICAL RADICULOPATHY: ICD-10-CM

## 2020-06-19 PROCEDURE — 99999 PR PBB SHADOW E&M-EST. PATIENT-LVL III: ICD-10-PCS | Mod: PBBFAC,,, | Performed by: FAMILY MEDICINE

## 2020-06-19 PROCEDURE — 99999 PR PBB SHADOW E&M-EST. PATIENT-LVL III: CPT | Mod: PBBFAC,,, | Performed by: FAMILY MEDICINE

## 2020-06-19 PROCEDURE — 99214 OFFICE O/P EST MOD 30 MIN: CPT | Mod: S$GLB,,, | Performed by: FAMILY MEDICINE

## 2020-06-19 PROCEDURE — 3008F PR BODY MASS INDEX (BMI) DOCUMENTED: ICD-10-PCS | Mod: CPTII,S$GLB,, | Performed by: FAMILY MEDICINE

## 2020-06-19 PROCEDURE — 3008F BODY MASS INDEX DOCD: CPT | Mod: CPTII,S$GLB,, | Performed by: FAMILY MEDICINE

## 2020-06-19 PROCEDURE — 99214 PR OFFICE/OUTPT VISIT, EST, LEVL IV, 30-39 MIN: ICD-10-PCS | Mod: S$GLB,,, | Performed by: FAMILY MEDICINE

## 2020-06-19 RX ORDER — TRAMADOL HYDROCHLORIDE 50 MG/1
50 TABLET ORAL 2 TIMES DAILY PRN
Status: CANCELLED | OUTPATIENT
Start: 2020-06-19

## 2020-06-19 RX ORDER — TIZANIDINE 4 MG/1
TABLET ORAL
COMMUNITY
Start: 2020-06-09 | End: 2022-11-14

## 2020-06-19 RX ORDER — CELECOXIB 200 MG/1
200 CAPSULE ORAL 2 TIMES DAILY
Qty: 180 CAPSULE | Refills: 1 | Status: SHIPPED | OUTPATIENT
Start: 2020-06-19 | End: 2021-01-27

## 2020-06-19 RX ORDER — TRAMADOL HYDROCHLORIDE 50 MG/1
TABLET ORAL
COMMUNITY
Start: 2020-05-27 | End: 2022-11-14

## 2020-06-19 RX ORDER — GABAPENTIN 800 MG/1
800 TABLET ORAL 2 TIMES DAILY
Qty: 180 TABLET | Refills: 1 | Status: SHIPPED | OUTPATIENT
Start: 2020-06-19 | End: 2020-06-19 | Stop reason: SDUPTHER

## 2020-06-19 RX ORDER — GABAPENTIN 800 MG/1
800 TABLET ORAL 3 TIMES DAILY
Qty: 270 TABLET | Refills: 3 | Status: SHIPPED | OUTPATIENT
Start: 2020-06-19 | End: 2020-11-17 | Stop reason: SDUPTHER

## 2020-06-19 NOTE — PROGRESS NOTES
Routine Office Visit    Patient Name: Srinivasan Munoz    : 1960  MRN: [id number]    Subjective:  Srinivasan is a 60 y.o. male who presents today for     1. Weight management - Patient has been struggling with his weight for 6-7 years but worse in the past 2-3 years. Patient feels his struggles include eating irregularly. Patient sometimes skips meals. Patient likes to eat peanut butter and chocolate late at night.      Current weight 305  Starting weight 314  Goal weight 250     Diet currently - vegetables and chicken      Sleep - 6 hours at night; tosses and turns at night due to neck and shoulder pain.      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      24 hour recall   Breakfast - no breakfast yet.   Snacks - still eating peanut butter once a week   Dinner - [employer] - chicken sandwich and fries  Snack - none   Lunch - ramen noodle soup   Breakfast - 2 eggs and rye toast.     Review of Systems   Constitutional: Negative for chills and fever.   HENT: Negative for congestion and hearing loss.    Eyes: Negative for blurred vision and discharge.   Respiratory: Negative for cough and wheezing.    Cardiovascular: Negative for chest pain and palpitations.   Gastrointestinal: Negative for abdominal pain, blood in stool, constipation, diarrhea, heartburn, nausea and vomiting.   Genitourinary: Negative for dysuria, hematuria and urgency.   Musculoskeletal: Positive for neck pain. Negative for myalgias.   Skin: Negative for itching and rash.   Neurological: Negative for dizziness, weakness and headaches.   Endo/Heme/Allergies: Negative for polydipsia.   Psychiatric/Behavioral: Negative for depression.       Active Problem List  Patient Active Problem List   Diagnosis    Morbid obesity with BMI of 40.0-44.9, adult    Chronic pain of right heel    Chronic pain of left ankle    Degenerative cervical disc    Nuclear sclerosis of left eye    Cervical  radiculopathy    Osteoarthritis of spine with radiculopathy, cervical region    Chronic left shoulder pain    Glenohumeral arthritis, left    Acute pain of left shoulder    Cervical pain    Neck stiffness    Numbness and tingling of both upper extremities    Left shoulder pain       Past Surgical History  Past Surgical History:   Procedure Laterality Date    CATARACT EXTRACTION Left 06/25/2018    Dr. Pa    CATARACT EXTRACTION W/  INTRAOCULAR LENS IMPLANT Right 1999    Dr. Landry    EPIDURAL STEROID INJECTION Left 5/1/2019    Procedure: Axillary, Suprascapular, and Lateral Pectoral Nerve Blocks;  Surgeon: Dameon Austin Jr., MD;  Location: [facility] ENDO;  Service: Pain Management;  Laterality: Left;  Left Axillary, Suprascapular and Lateral Pectoral Nerve Blocks under Fluoroscopy    00834    Arrive @ 0900; NO Sedation; Confirm with Edmundo; (No DM or anticoag)    EPIDURAL STEROID INJECTION Left 5/16/2019    Procedure: Nerve Radiofrequency Thermocoagulation;  Surgeon: Dameon Austin Jr., MD;  Location: [facility] ENDO;  Service: Pain Management;  Laterality: Left;  Left Axillary, Suprascapular and Lateral Pectoral Nerve Radiofrequency Ablations    08699    Arrive @ 1100; IV Sedation- Fasting; No Anticoags or DM    EPIDURAL STEROID INJECTION N/A 10/2/2019    Procedure: Injection, Steroid, Epidural Cervical;  Surgeon: Dameon Austin Jr., MD;  Location: [facility] ENDO;  Service: Pain Management;  Laterality: N/A;  C7-T1 JHOAN    70129    Arrive @ 1130; No ATC or DM    EYE SURGERY      INTRAOCULAR PROSTHESES INSERTION Left 6/25/2018    Procedure: INSERTION-INTRAOCULAR LENS (IOL);  Surgeon: Funmilayo Pa MD;  Location: [facility] OR;  Service: Ophthalmology;  Laterality: Left;    LASIK Bilateral 2000    PHACOEMULSIFICATION OF CATARACT Left 6/25/2018    Procedure: PHACOEMULSIFICATION-ASPIRATION-CATARACT;  Surgeon: Funmilayo Pa MD;  Location: [facility] OR;  Service: Ophthalmology;  Laterality: Left;    RETINAL  DETACHMENT SURGERY Right 1999    Dr. Landry       Family History  Family History   Problem Relation Age of Onset    Heart disease Father     Hypertension Father     Diabetes Mother     Stroke Mother     Cancer Mother     Hypertension Mother     Stroke Brother     Diabetes Brother     No Known Problems Sister     No Known Problems Maternal Aunt     No Known Problems Maternal Uncle     No Known Problems Paternal Aunt     No Known Problems Paternal Uncle     No Known Problems Maternal Grandmother     No Known Problems Maternal Grandfather     No Known Problems Paternal Grandmother     No Known Problems Paternal Grandfather     Amblyopia Neg Hx     Blindness Neg Hx     Cataracts Neg Hx     Glaucoma Neg Hx     Macular degeneration Neg Hx     Retinal detachment Neg Hx     Strabismus Neg Hx     Thyroid disease Neg Hx        Social History  Social History     Socioeconomic History    Marital status:      Spouse name: Not on file    Number of children: Not on file    Years of education: Not on file    Highest education level: Not on file   Occupational History    Not on file   Social Needs    Financial resource strain: Not very hard    Food insecurity     Worry: Not on file     Inability: Not on file    Transportation needs     Medical: No     Non-medical: No   Tobacco Use    Smoking status: Current Every Day Smoker     Packs/day: 1.50     Years: 42.00     Pack years: 63.00     Types: Cigarettes    Smokeless tobacco: Never Used   Substance and Sexual Activity    Alcohol use: No     Frequency: Monthly or less     Drinks per session: Patient refused     Binge frequency: Never    Drug use: No    Sexual activity: Yes     Partners: Female   Lifestyle    Physical activity     Days per week: 5 days     Minutes per session: Not on file    Stress: To some extent   Relationships    Social connections     Talks on phone: More than three times a week     Gets together: Once a week      "Attends Rastafari service: Not on file     Active member of club or organization: Yes     Attends meetings of clubs or organizations: More than 4 times per year     Relationship status:    Other Topics Concern    Not on file   Social History Narrative    Not on file       Medications and Allergies  Reviewed and updated.   Current Outpatient Medications   Medication Sig    celecoxib (CELEBREX) 200 MG capsule Take 1 capsule (200 mg total) by mouth 2 (two) times daily.    gabapentin (NEURONTIN) 800 MG tablet Take 1 tablet (800 mg total) by mouth 3 (three) times daily.    tiZANidine (ZANAFLEX) 4 MG tablet     traMADoL (ULTRAM) 50 mg tablet     DIPH,PERTUSS,ACEL,,TET VAC,PF, ADULT (ADACEL,TDAP ADOLESN/ADULT,,PF,) 2 Lf-(2.5-5-3-5 mcg)-5Lf/0.5 mL Syrg Adacel (Tdap Adolesn/Adult)(PF)2 Lf-(2.5-5-3-5)-5 Lf/0.5 mL IM syringe    FLUZONE QUAD 5840-8971, PF, 60 mcg (15 mcg x 4)/0.5 mL Syrg PHARMACIST ADMINISTERED IMMUNIZATION ADMINISTERED AT TIME OF DISPENSING     No current facility-administered medications for this visit.        Physical Exam  /80 (BP Location: Left arm, Patient Position: Sitting, BP Method: Large (Manual))   Pulse 74   Temp 97.7 °F (36.5 °C) (Temporal)   Ht 6' 1" (1.854 m)   Wt (!) 138.7 kg (305 lb 12.5 oz)   SpO2 98%   BMI 40.34 kg/m²   Physical Exam  Constitutional:       Appearance: He is well-developed.   HENT:      Head: Normocephalic and atraumatic.   Eyes:      Conjunctiva/sclera: Conjunctivae normal.      Pupils: Pupils are equal, round, and reactive to light.   Neck:      Musculoskeletal: Normal range of motion and neck supple.      Thyroid: No thyromegaly.      Vascular: No JVD.   Cardiovascular:      Rate and Rhythm: Normal rate and regular rhythm.      Heart sounds: Normal heart sounds.   Pulmonary:      Effort: Pulmonary effort is normal.      Breath sounds: Normal breath sounds. No wheezing.   Abdominal:      General: Bowel sounds are normal. There is no distension.     "  Palpations: Abdomen is soft.      Tenderness: There is no abdominal tenderness. There is no guarding.   Musculoskeletal: Normal range of motion.   Lymphadenopathy:      Cervical: No cervical adenopathy.   Skin:     General: Skin is warm and dry.   Neurological:      Mental Status: He is alert and oriented to person, place, and time.   Psychiatric:         Behavior: Behavior normal.           Assessment/Plan:  Srinivasan Munoz is a 60 y.o. male who presents today for :    Problem List Items Addressed This Visit        Neuro    Cervical radiculopathy    Relevant Medications    tiZANidine (ZANAFLEX) 4 MG tablet    celecoxib (CELEBREX) 200 MG capsule  The current medical regimen is effective;  continue present plan and medications.      Degenerative cervical disc - Primary    Overview     With radiculopathy  Pt previously on gabapentin; would like to restart  Patient taking gabapentin 3 times per day          Relevant Medications    gabapentin (NEURONTIN) 800 MG tablet       Endocrine    Morbid obesity with BMI of 40.0-44.9, adult    Overview     The patient is asked to make an attempt to improve diet and exercise patterns to aid in medical management of this problem.  Recommend to stop peanut butter and chocolate   Recommend to eat portion controlled foods   Avoid processed carbohydrates  Eat whole foods   Increase sleep - recommend 7-8 hours   Diet and exercise planning discussed.  Increase exercise  Decrease stress  Increase vegetable intake                 Orthopedic    Cervical pain    Chronic left shoulder pain  The current medical regimen is effective;  continue present plan and medications.  Continue f/u with Dr. Jones           Follow up in about 2 months (around 8/19/2020).

## 2020-08-06 ENCOUNTER — PATIENT OUTREACH (OUTPATIENT)
Dept: ADMINISTRATIVE | Facility: HOSPITAL | Age: 60
End: 2020-08-06

## 2020-08-07 ENCOUNTER — CLINICAL SUPPORT (OUTPATIENT)
Dept: SMOKING CESSATION | Facility: CLINIC | Age: 60
End: 2020-08-07
Payer: COMMERCIAL

## 2020-08-07 DIAGNOSIS — F17.200 NICOTINE DEPENDENCE: Primary | ICD-10-CM

## 2020-08-07 PROCEDURE — 99406 PR TOBACCO USE CESSATION INTERMEDIATE 3-10 MINUTES: ICD-10-PCS | Mod: S$GLB,,, | Performed by: INTERNAL MEDICINE

## 2020-08-07 PROCEDURE — 99406 BEHAV CHNG SMOKING 3-10 MIN: CPT | Mod: S$GLB,,, | Performed by: INTERNAL MEDICINE

## 2020-08-07 NOTE — PROGRESS NOTES
Spoke with patient's wife today in regard to smoking cessation progress for 12 month telephone follow up, she states he is not tobacco free. Patient's wife states they quit the program and have no interest in returning at this time. Informed patient's wife of benefit period and contact information if any further help or support is needed. Will resolve episode and complete smart form for Quit attempt #1.

## 2020-09-22 ENCOUNTER — PATIENT OUTREACH (OUTPATIENT)
Dept: ADMINISTRATIVE | Facility: HOSPITAL | Age: 60
End: 2020-09-22

## 2020-10-12 ENCOUNTER — PATIENT OUTREACH (OUTPATIENT)
Dept: ADMINISTRATIVE | Facility: OTHER | Age: 60
End: 2020-10-12

## 2020-10-12 DIAGNOSIS — Z12.11 ENCOUNTER FOR FIT (FECAL IMMUNOCHEMICAL TEST) SCREENING: Primary | ICD-10-CM

## 2020-10-12 NOTE — PROGRESS NOTES
Requested updates within Care Everywhere.  Patient's chart was reviewed for overdue DIAMANTE topics.  Immunizations reconciled.    Orders placed:[employer]  Tasked appts:n/a  Labs Linked:n/a

## 2020-10-13 ENCOUNTER — OFFICE VISIT (OUTPATIENT)
Dept: DERMATOLOGY | Facility: CLINIC | Age: 60
End: 2020-10-13
Payer: COMMERCIAL

## 2020-10-13 DIAGNOSIS — L82.1 SK (SEBORRHEIC KERATOSIS): ICD-10-CM

## 2020-10-13 DIAGNOSIS — Z12.83 SCREENING EXAM FOR SKIN CANCER: ICD-10-CM

## 2020-10-13 DIAGNOSIS — D18.01 CHERRY ANGIOMA: ICD-10-CM

## 2020-10-13 DIAGNOSIS — D48.5 NEOPLASM OF UNCERTAIN BEHAVIOR OF SKIN: Primary | ICD-10-CM

## 2020-10-13 DIAGNOSIS — D22.9 MULTIPLE BENIGN NEVI: ICD-10-CM

## 2020-10-13 DIAGNOSIS — L81.4 SOLAR LENTIGO: ICD-10-CM

## 2020-10-13 PROCEDURE — 99999 PR PBB SHADOW E&M-EST. PATIENT-LVL III: CPT | Mod: PBBFAC,,, | Performed by: PATHOLOGY

## 2020-10-13 PROCEDURE — 88305 TISSUE EXAM BY PATHOLOGIST: CPT | Mod: 26,,, | Performed by: PATHOLOGY

## 2020-10-13 PROCEDURE — 11102 TANGNTL BX SKIN SINGLE LES: CPT | Mod: S$GLB,,, | Performed by: PATHOLOGY

## 2020-10-13 PROCEDURE — 88305 TISSUE EXAM BY PATHOLOGIST: ICD-10-PCS | Mod: 26,,, | Performed by: PATHOLOGY

## 2020-10-13 PROCEDURE — 99999 PR PBB SHADOW E&M-EST. PATIENT-LVL III: ICD-10-PCS | Mod: PBBFAC,,, | Performed by: PATHOLOGY

## 2020-10-13 PROCEDURE — 88305 TISSUE EXAM BY PATHOLOGIST: CPT | Performed by: PATHOLOGY

## 2020-10-13 PROCEDURE — 11102 PR TANGENTIAL BIOPSY, SKIN, SINGLE LESION: ICD-10-PCS | Mod: S$GLB,,, | Performed by: PATHOLOGY

## 2020-10-13 PROCEDURE — 99203 PR OFFICE/OUTPT VISIT, NEW, LEVL III, 30-44 MIN: ICD-10-PCS | Mod: 25,S$GLB,, | Performed by: PATHOLOGY

## 2020-10-13 PROCEDURE — 99203 OFFICE O/P NEW LOW 30 MIN: CPT | Mod: 25,S$GLB,, | Performed by: PATHOLOGY

## 2020-10-13 NOTE — PROGRESS NOTES
"  Subjective:       Patient ID:  Srinivasan Munoz is a 60 y.o. male who presents for   Chief Complaint   Patient presents with    Spot     Pt presents today for spots, scalp, face and arm     HPI     Patient is here today for a "mole" check.   Pt has a history of  severe sun exposure in the past.   Pt recalls several blistering sunburns in the past- yes  Pt has history of tanning bed use- yes  Pt has  had moles removed in the past- no  Pt has history of melanoma in first degree relatives-  Not that pt is aware of    Works outdoors.  Does not wear sunscreen.  Wears baseball cap.  No personal h/o dysplastic nevi, MM or NMSC.    Has persistent irritated bump to left cheek that bleeds x several months.    Review of Systems   Constitutional: Negative for fever, chills, weight loss, weight gain, fatigue, night sweats and malaise.   Skin: Positive for activity-related sunscreen use and wears hat. Negative for itching, rash and daily sunscreen use.   Hematologic/Lymphatic: Bruises/bleeds easily.        Objective:    Physical Exam   Constitutional: He appears well-developed and well-nourished. No distress.   Neurological: He is alert and oriented to person, place, and time. He is not disoriented.   Psychiatric: He has a normal mood and affect.   Skin:   Areas Examined (abnormalities noted in diagram):   Scalp / Hair Palpated and Inspected  Head / Face Inspection Performed  Neck Inspection Performed  Chest / Axilla Inspection Performed  Abdomen Inspection Performed  Back Inspection Performed  RUE Inspected  LUE Inspection Performed                   Diagram Legend     Erythematous scaling macule/papule c/w actinic keratosis       Vascular papule c/w angioma      Pigmented verrucoid papule/plaque c/w seborrheic keratosis      Yellow umbilicated papule c/w sebaceous hyperplasia      Irregularly shaped tan macule c/w lentigo     1-2 mm smooth white papules consistent with Milia      Movable subcutaneous cyst with punctum c/w " epidermal inclusion cyst      Subcutaneous movable cyst c/w pilar cyst      Firm pink to brown papule c/w dermatofibroma      Pedunculated fleshy papule(s) c/w skin tag(s)      Evenly pigmented macule c/w junctional nevus     Mildly variegated pigmented, slightly irregular-bordered macule c/w mildly atypical nevus      Flesh colored to evenly pigmented papule c/w intradermal nevus       Pink pearly papule/plaque c/w basal cell carcinoma      Erythematous hyperkeratotic cursted plaque c/w SCC      Surgical scar with no sign of skin cancer recurrence      Open and closed comedones      Inflammatory papules and pustules      Verrucoid papule consistent consistent with wart     Erythematous eczematous patches and plaques     Dystrophic onycholytic nail with subungual debris c/w onychomycosis     Umbilicated papule    Erythematous-base heme-crusted tan verrucoid plaque consistent with inflamed seborrheic keratosis     Erythematous Silvery Scaling Plaque c/w Psoriasis     See annotation      Assessment / Plan:      Pathology Orders:     Normal Orders This Visit    Specimen to Pathology, Dermatology     Comments:    Number of Specimens:->1  ------------------------->-------------------------  Spec 1 Procedure:->Biopsy  Spec 1 Clinical Impression:->r/o ISK vs NMSC  Spec 1 Source:->left cheek    Questions:    Procedure Type: Dermatology and skin neoplasms    Number of Specimens: 1    ------------------------: -------------------------    Spec 1 Procedure: Biopsy    Spec 1 Clinical Impression: r/o ISK vs NMSC    Spec 1 Source: left cheek        Neoplasm of uncertain behavior of skin  -     Specimen to Pathology, Dermatology    Shave biopsy procedure note:    Shave biopsy performed after verbal consent including risk of infection, scar, recurrence, need for additional treatment of site. Area prepped with alcohol, anesthetized with approximately 1.0cc of 1% lidocaine with epinephrine. Lesional tissue shaved with razor blade.  Hemostasis achieved with application of aluminum chloride followed by hyfrecation. No complications. Dressing applied. Wound care explained.        SK (seborrheic keratosis) - These are benign inherited growths without a malignant potential. Reassurance given to patient. No treatment is necessary.       Solar lentigo - This is a benign hyperpigmented sun induced lesion. Daily sun protection will reduce the number of new lesions. Treatment of these benign lesions are considered cosmetic.      Cherry angioma - This is a benign vascular lesion. Reassurance given. No treatment required.       Screening exam for skin cancer - Upper body skin examination performed today including at least 6 points as noted in physical examination. No lesions suspicious for malignancy noted.      Multiple benign nevi - Patient with several benign appearing nevi. Instructed patient to observe lesion(s) for changes and follow up in clinic if changes are noted.                No follow-ups on file.

## 2020-10-16 LAB
FINAL PATHOLOGIC DIAGNOSIS: NORMAL
GROSS: NORMAL

## 2020-10-19 ENCOUNTER — PATIENT MESSAGE (OUTPATIENT)
Dept: DERMATOLOGY | Facility: CLINIC | Age: 60
End: 2020-10-19

## 2020-11-02 ENCOUNTER — TELEPHONE (OUTPATIENT)
Dept: DERMATOLOGY | Facility: CLINIC | Age: 60
End: 2020-11-02

## 2020-11-02 NOTE — TELEPHONE ENCOUNTER
Called pt to confirm Mohs procedure for SCC L cheek  Wed Nov 4.  Did Covid 19 screening, negative. Okay to proceed with Mohs as planned.

## 2020-11-04 ENCOUNTER — PROCEDURE VISIT (OUTPATIENT)
Dept: DERMATOLOGY | Facility: CLINIC | Age: 60
End: 2020-11-04
Payer: COMMERCIAL

## 2020-11-04 VITALS
BODY MASS INDEX: 40.53 KG/M2 | HEIGHT: 73 IN | WEIGHT: 305.81 LBS | HEART RATE: 59 BPM | SYSTOLIC BLOOD PRESSURE: 139 MMHG | DIASTOLIC BLOOD PRESSURE: 85 MMHG

## 2020-11-04 DIAGNOSIS — C44.329 SQUAMOUS CELL CARCINOMA OF SKIN OF LEFT CHEEK: Primary | ICD-10-CM

## 2020-11-04 PROCEDURE — 13131 CMPLX RPR F/C/C/M/N/AX/G/H/F: CPT | Mod: S$GLB,,, | Performed by: DERMATOLOGY

## 2020-11-04 PROCEDURE — 99499 NO LOS: ICD-10-PCS | Mod: S$GLB,,, | Performed by: DERMATOLOGY

## 2020-11-04 PROCEDURE — 17311 MOHS 1 STAGE H/N/HF/G: CPT | Mod: 51,S$GLB,, | Performed by: DERMATOLOGY

## 2020-11-04 PROCEDURE — 99499 UNLISTED E&M SERVICE: CPT | Mod: S$GLB,,, | Performed by: DERMATOLOGY

## 2020-11-04 PROCEDURE — 17311: ICD-10-PCS | Mod: 51,S$GLB,, | Performed by: DERMATOLOGY

## 2020-11-04 PROCEDURE — 13131 PR RECMPL WND HEAD,FAC,HAND 1.1-2.5 CM: ICD-10-PCS | Mod: S$GLB,,, | Performed by: DERMATOLOGY

## 2020-11-04 NOTE — PROGRESS NOTES
PROCEDURE: Mohs' Micrographic Surgery    INDICATION: Location in non-mask areas of face where maximum conservation of tumor-free tissue is needed. Biopsy-proven skin cancer of cosmetically and functionally important areas, including head, neck, genital, hand, foot, or areas known for having difficulty in healing, such as the lower anterior legs. Tumor with ill-defined borders.    REFERRING PROVIDER: Alicia Stroud M.D.    CASE NUMBER:     ANESTHETIC: 4 cc 0.5% Lidocaine with Epi 1:200,000 mixed 1:1 with 0.5% Bupivacaine    SURGICAL PREP: Hibiclens    SURGEON: Suman Weldon MD    ASSISTANTS: Krystal Mata PA-C and Trixie Matthew MA    PREOPERATIVE DIAGNOSIS: squamous cell carcinoma    POSTOPERATIVE DIAGNOSIS: squamous cell carcinoma    PATHOLOGIC DIAGNOSIS: squamous cell carcinoma- invasive, well differentiated    HISTOLOGY OF SPECIMENS IN FIRST STAGE:   Tumor Type: No tumnor seen.    STAGES OF MOHS' SURGERY PERFORMED: 1    TUMOR-FREE PLANE ACHIEVED: Yes    HEMOSTASIS: electrocoagulation     SPECIMENS: 2    LOCATION: left cheek. Location verified with Dr. Stroud's clinical photograph. Patient also verified location with hand held mirror.     INITIAL LESION SIZE: 0.5 x 0.8 cm    FINAL DEFECT SIZE: 0.9 x 1.1 cm    WOUND REPAIR/DISPOSITION: The patient tolerated Mohs' Micrographic Surgery for a squamous cell carcinoma very well. When the tumor was completely removed, a repair of the surgical defect was undertaken.       PROCEDURE: Complex Linear Repair    INDICATION: Status post Mohs' Micrographic Surgery for squamous cell carcinoma.    CASE NUMBER:     SURGEON: Suman Weldon MD    ASSISTANTS: Krystal Mata PA-C and Trixie Matthew MA    ANESTHETIC: 2 cc 1% Lidocaine with Epinephrine 1:100,000    SURGICAL PREP: Hibiclens, prepped by Trixie Matthew MA    LOCATION: left cheek    DEFECT SIZE: 0.9 x 1.1 cm    WOUND REPAIR/DISPOSITION:  After the patient's carcinoma had been completely removed with Mohs'  "Micrographic Surgery, a repair of the surgical defect was undertaken. The patient was returned to the operating suite where the area of left cheek was prepped, draped, and anesthetized in the usual sterile fashion. The wound was widely undermined in all directions. Then, electrocoagulation was used to obtain meticulous hemostasis. 5-0 Vicryl buried vertical mattress sutures were placed into the subcutaneous and dermal plane to close the wound and melinda the cutaneous wound edge. Bilateral dog ears were identified and were removed by a standard Burow's triangle technique. The cutaneous wound edges were closed using running 5-0 Prolene suture.    The patient tolerated the procedure well.    The area was cleaned and dressed appropriately and the patient was given wound care instructions, as well as appointment for follow-up evaluation and suture removal in 7 days.    LENGTH OF REPAIR: 2 cm    Vitals:    11/04/20 1214 11/04/20 1425   BP: (!) 142/84 139/85   BP Location: Left arm    Patient Position: Sitting    BP Method: Large (Automatic)    Pulse: 62 (!) 59   Weight: (!) 138.7 kg (305 lb 12.5 oz)    Height: 6' 1" (1.854 m)        "

## 2020-11-11 ENCOUNTER — OFFICE VISIT (OUTPATIENT)
Dept: DERMATOLOGY | Facility: CLINIC | Age: 60
End: 2020-11-11
Payer: COMMERCIAL

## 2020-11-11 DIAGNOSIS — Z09 POSTOP CHECK: Primary | ICD-10-CM

## 2020-11-11 PROCEDURE — 99999 PR PBB SHADOW E&M-EST. PATIENT-LVL II: CPT | Mod: PBBFAC,,, | Performed by: DERMATOLOGY

## 2020-11-11 PROCEDURE — 99024 POSTOP FOLLOW-UP VISIT: CPT | Mod: S$GLB,,, | Performed by: DERMATOLOGY

## 2020-11-11 PROCEDURE — 99999 PR PBB SHADOW E&M-EST. PATIENT-LVL II: ICD-10-PCS | Mod: PBBFAC,,, | Performed by: DERMATOLOGY

## 2020-11-11 PROCEDURE — 99024 PR POST-OP FOLLOW-UP VISIT: ICD-10-PCS | Mod: S$GLB,,, | Performed by: DERMATOLOGY

## 2020-11-11 NOTE — PROGRESS NOTES
60 y.o. male patient is here for suture removal following Mohs' surgery.    Patient reports no problems.    WOUND PE:  The L cheek sutures intact. Wound healing well. Good skin edges. No signs or symptoms of infection.      IMPRESSION:  Healing operative site from Mohs' surgery, SCC L cheek s/p Mohs with CLC, postop day #7.    PLAN:  Sutures removed today by Cristy Florence, Surg Tech. Steri-strips applied.  Continue wound care.  Keep moist with Aquaphor.    RTC:  In 3-6 months with Alicia Stroud M.D. for skin check or sooner if new concern arises.

## 2020-11-17 DIAGNOSIS — M50.30 DEGENERATIVE CERVICAL DISC: ICD-10-CM

## 2020-11-17 RX ORDER — GABAPENTIN 800 MG/1
800 TABLET ORAL 3 TIMES DAILY
Qty: 270 TABLET | Refills: 3 | Status: SHIPPED | OUTPATIENT
Start: 2020-11-17 | End: 2021-12-06 | Stop reason: SDUPTHER

## 2020-11-17 NOTE — TELEPHONE ENCOUNTER
----- Message from Naima Grimes sent at 11/17/2020  9:15 AM CST -----  Regarding: self  [phone]  .Type: Patient Call Back    Who called: self     What is the request in detail: Requesting to have gabapentin (NEURONTIN) 800 MG tablet refilled     Can the clinic reply by [facility]? Call back     Would the patient rather a call back or a response via [facility]? Call back     Best call back number: [phone]

## 2020-12-02 ENCOUNTER — PATIENT MESSAGE (OUTPATIENT)
Dept: ADMINISTRATIVE | Facility: HOSPITAL | Age: 60
End: 2020-12-02

## 2021-02-25 ENCOUNTER — PATIENT OUTREACH (OUTPATIENT)
Dept: ADMINISTRATIVE | Facility: OTHER | Age: 61
End: 2021-02-25

## 2021-02-25 ENCOUNTER — HOSPITAL ENCOUNTER (OUTPATIENT)
Dept: RADIOLOGY | Facility: HOSPITAL | Age: 61
Discharge: HOME | End: 2021-02-25
Attending: PHYSICIAN ASSISTANT
Payer: COMMERCIAL

## 2021-02-25 ENCOUNTER — OFFICE VISIT (OUTPATIENT)
Dept: ORTHOPEDICS | Facility: CLINIC | Age: 61
End: 2021-02-25
Payer: COMMERCIAL

## 2021-02-25 VITALS — BODY MASS INDEX: 40.52 KG/M2 | HEIGHT: 73 IN | WEIGHT: 305.75 LBS

## 2021-02-25 DIAGNOSIS — S90.32XA HEMATOMA OF LEFT FOOT: ICD-10-CM

## 2021-02-25 DIAGNOSIS — M79.672 LEFT FOOT PAIN: Primary | ICD-10-CM

## 2021-02-25 DIAGNOSIS — M79.672 LEFT FOOT PAIN: ICD-10-CM

## 2021-02-25 PROCEDURE — 99213 OFFICE O/P EST LOW 20 MIN: CPT | Mod: S$GLB,,, | Performed by: PHYSICIAN ASSISTANT

## 2021-02-25 PROCEDURE — 99213 PR OFFICE/OUTPT VISIT, EST, LEVL III, 20-29 MIN: ICD-10-PCS | Mod: S$GLB,,, | Performed by: PHYSICIAN ASSISTANT

## 2021-02-25 PROCEDURE — 73630 XR FOOT COMPLETE 3 VIEW LEFT: ICD-10-PCS | Mod: 26,LT,, | Performed by: RADIOLOGY

## 2021-02-25 PROCEDURE — 73630 X-RAY EXAM OF FOOT: CPT | Mod: TC,LT

## 2021-02-25 PROCEDURE — 1125F AMNT PAIN NOTED PAIN PRSNT: CPT | Mod: S$GLB,,, | Performed by: PHYSICIAN ASSISTANT

## 2021-02-25 PROCEDURE — 3008F BODY MASS INDEX DOCD: CPT | Mod: CPTII,S$GLB,, | Performed by: PHYSICIAN ASSISTANT

## 2021-02-25 PROCEDURE — 99999 PR PBB SHADOW E&M-EST. PATIENT-LVL III: CPT | Mod: PBBFAC,,, | Performed by: PHYSICIAN ASSISTANT

## 2021-02-25 PROCEDURE — 73630 X-RAY EXAM OF FOOT: CPT | Mod: 26,LT,, | Performed by: RADIOLOGY

## 2021-02-25 PROCEDURE — 3008F PR BODY MASS INDEX (BMI) DOCUMENTED: ICD-10-PCS | Mod: CPTII,S$GLB,, | Performed by: PHYSICIAN ASSISTANT

## 2021-02-25 PROCEDURE — 1125F PR PAIN SEVERITY QUANTIFIED, PAIN PRESENT: ICD-10-PCS | Mod: S$GLB,,, | Performed by: PHYSICIAN ASSISTANT

## 2021-02-25 PROCEDURE — 99999 PR PBB SHADOW E&M-EST. PATIENT-LVL III: ICD-10-PCS | Mod: PBBFAC,,, | Performed by: PHYSICIAN ASSISTANT

## 2021-02-25 RX ORDER — DICLOFENAC SODIUM 75 MG/1
75 TABLET, DELAYED RELEASE ORAL 2 TIMES DAILY
Qty: 28 TABLET | Refills: 0 | Status: SHIPPED | OUTPATIENT
Start: 2021-02-25 | End: 2021-03-27

## 2021-03-08 ENCOUNTER — IMMUNIZATION (OUTPATIENT)
Dept: PHARMACY | Facility: CLINIC | Age: 61
End: 2021-03-08
Payer: COMMERCIAL

## 2021-03-08 DIAGNOSIS — Z23 NEED FOR VACCINATION: Primary | ICD-10-CM

## 2021-03-09 ENCOUNTER — TELEPHONE (OUTPATIENT)
Dept: PAIN MEDICINE | Facility: CLINIC | Age: 61
End: 2021-03-09

## 2021-04-05 ENCOUNTER — IMMUNIZATION (OUTPATIENT)
Dept: PHARMACY | Facility: CLINIC | Age: 61
End: 2021-04-05
Payer: COMMERCIAL

## 2021-04-05 DIAGNOSIS — Z23 NEED FOR VACCINATION: Primary | ICD-10-CM

## 2021-06-12 DIAGNOSIS — M54.12 CERVICAL RADICULOPATHY: ICD-10-CM

## 2021-06-14 RX ORDER — CELECOXIB 200 MG/1
200 CAPSULE ORAL 2 TIMES DAILY
Qty: 180 CAPSULE | Refills: 0 | Status: SHIPPED | OUTPATIENT
Start: 2021-06-14 | End: 2021-09-20 | Stop reason: SDUPTHER

## 2021-09-20 DIAGNOSIS — M54.12 CERVICAL RADICULOPATHY: ICD-10-CM

## 2021-09-20 RX ORDER — CELECOXIB 200 MG/1
200 CAPSULE ORAL 2 TIMES DAILY
Qty: 180 CAPSULE | Refills: 0 | Status: SHIPPED | OUTPATIENT
Start: 2021-09-20 | End: 2022-01-08 | Stop reason: SDUPTHER

## 2021-11-16 ENCOUNTER — OFFICE VISIT (OUTPATIENT)
Dept: FAMILY MEDICINE | Facility: CLINIC | Age: 61
End: 2021-11-16
Payer: COMMERCIAL

## 2021-11-16 VITALS
DIASTOLIC BLOOD PRESSURE: 70 MMHG | TEMPERATURE: 100 F | OXYGEN SATURATION: 97 % | HEART RATE: 75 BPM | HEIGHT: 73 IN | WEIGHT: 289.13 LBS | SYSTOLIC BLOOD PRESSURE: 132 MMHG | BODY MASS INDEX: 38.32 KG/M2

## 2021-11-16 DIAGNOSIS — R05.9 COUGH: ICD-10-CM

## 2021-11-16 DIAGNOSIS — Z72.0 TOBACCO ABUSE: ICD-10-CM

## 2021-11-16 DIAGNOSIS — J20.9 ACUTE BRONCHITIS, UNSPECIFIED ORGANISM: Primary | ICD-10-CM

## 2021-11-16 PROCEDURE — 3008F PR BODY MASS INDEX (BMI) DOCUMENTED: ICD-10-PCS | Mod: CPTII,S$GLB,, | Performed by: FAMILY MEDICINE

## 2021-11-16 PROCEDURE — 3078F PR MOST RECENT DIASTOLIC BLOOD PRESSURE < 80 MM HG: ICD-10-PCS | Mod: CPTII,S$GLB,, | Performed by: FAMILY MEDICINE

## 2021-11-16 PROCEDURE — 3008F BODY MASS INDEX DOCD: CPT | Mod: CPTII,S$GLB,, | Performed by: FAMILY MEDICINE

## 2021-11-16 PROCEDURE — 3078F DIAST BP <80 MM HG: CPT | Mod: CPTII,S$GLB,, | Performed by: FAMILY MEDICINE

## 2021-11-16 PROCEDURE — 99214 OFFICE O/P EST MOD 30 MIN: CPT | Mod: S$GLB,,, | Performed by: FAMILY MEDICINE

## 2021-11-16 PROCEDURE — 99214 PR OFFICE/OUTPT VISIT, EST, LEVL IV, 30-39 MIN: ICD-10-PCS | Mod: S$GLB,,, | Performed by: FAMILY MEDICINE

## 2021-11-16 PROCEDURE — 3075F SYST BP GE 130 - 139MM HG: CPT | Mod: CPTII,S$GLB,, | Performed by: FAMILY MEDICINE

## 2021-11-16 PROCEDURE — U0005 INFEC AGEN DETEC AMPLI PROBE: HCPCS | Performed by: FAMILY MEDICINE

## 2021-11-16 PROCEDURE — 3075F PR MOST RECENT SYSTOLIC BLOOD PRESS GE 130-139MM HG: ICD-10-PCS | Mod: CPTII,S$GLB,, | Performed by: FAMILY MEDICINE

## 2021-11-16 PROCEDURE — 1159F MED LIST DOCD IN RCRD: CPT | Mod: CPTII,S$GLB,, | Performed by: FAMILY MEDICINE

## 2021-11-16 PROCEDURE — 99999 PR PBB SHADOW E&M-EST. PATIENT-LVL III: CPT | Mod: PBBFAC,,, | Performed by: FAMILY MEDICINE

## 2021-11-16 PROCEDURE — U0003 INFECTIOUS AGENT DETECTION BY NUCLEIC ACID (DNA OR RNA); SEVERE ACUTE RESPIRATORY SYNDROME CORONAVIRUS 2 (SARS-COV-2) (CORONAVIRUS DISEASE [COVID-19]), AMPLIFIED PROBE TECHNIQUE, MAKING USE OF HIGH THROUGHPUT TECHNOLOGIES AS DESCRIBED BY CMS-2020-01-R: HCPCS | Performed by: FAMILY MEDICINE

## 2021-11-16 PROCEDURE — 1159F PR MEDICATION LIST DOCUMENTED IN MEDICAL RECORD: ICD-10-PCS | Mod: CPTII,S$GLB,, | Performed by: FAMILY MEDICINE

## 2021-11-16 PROCEDURE — 99999 PR PBB SHADOW E&M-EST. PATIENT-LVL III: ICD-10-PCS | Mod: PBBFAC,,, | Performed by: FAMILY MEDICINE

## 2021-11-16 RX ORDER — CODEINE PHOSPHATE AND GUAIFENESIN 10; 100 MG/5ML; MG/5ML
5 SOLUTION ORAL EVERY 8 HOURS PRN
Qty: 180 ML | Refills: 0 | Status: SHIPPED | OUTPATIENT
Start: 2021-11-16 | End: 2021-11-26

## 2021-11-16 RX ORDER — ALBUTEROL SULFATE 90 UG/1
2 AEROSOL, METERED RESPIRATORY (INHALATION) EVERY 6 HOURS PRN
Qty: 18 G | Refills: 0 | Status: SHIPPED | OUTPATIENT
Start: 2021-11-16 | End: 2022-11-14 | Stop reason: SDUPTHER

## 2021-11-16 RX ORDER — AZITHROMYCIN 250 MG/1
TABLET, FILM COATED ORAL
Qty: 6 TABLET | Refills: 0 | Status: SHIPPED | OUTPATIENT
Start: 2021-11-16 | End: 2022-11-14

## 2021-11-16 RX ORDER — PREDNISONE 20 MG/1
60 TABLET ORAL DAILY
Qty: 15 TABLET | Refills: 0 | Status: SHIPPED | OUTPATIENT
Start: 2021-11-16 | End: 2021-11-21

## 2021-11-17 LAB
SARS-COV-2 RNA RESP QL NAA+PROBE: NOT DETECTED
SARS-COV-2- CYCLE NUMBER: NORMAL

## 2022-01-02 ENCOUNTER — PATIENT MESSAGE (OUTPATIENT)
Dept: ADMINISTRATIVE | Facility: HOSPITAL | Age: 62
End: 2022-01-02
Payer: COMMERCIAL

## 2022-01-18 ENCOUNTER — TELEPHONE (OUTPATIENT)
Dept: FAMILY MEDICINE | Facility: CLINIC | Age: 62
End: 2022-01-18
Payer: COMMERCIAL

## 2022-01-18 RX ORDER — CODEINE PHOSPHATE AND GUAIFENESIN 10; 100 MG/5ML; MG/5ML
5 SOLUTION ORAL EVERY 6 HOURS PRN
Qty: 180 ML | Refills: 0 | Status: SHIPPED | OUTPATIENT
Start: 2022-01-18 | End: 2022-01-20 | Stop reason: SDUPTHER

## 2022-01-18 NOTE — TELEPHONE ENCOUNTER
----- Message from Oksana Pizarro sent at 1/18/2022  7:53 AM CST -----  Type: Patient Call Back       What is the request in detail:  pt calling to speak to a nurse regarding covid symptoms pt is have sinus and coughing .      Can the clinic reply by [facility]? No       Would the patient rather a call back or a response via [facility]? Call back       Best call back number: [phone]        Thank you.

## 2022-01-18 NOTE — TELEPHONE ENCOUNTER
I will send in cough medication.     If you have fever, headache and body aches you can take an anti-inflammatory such as ibuprofen or acetaminophen  For cough and congestion, you can take over the counter cough medication such as Robitussin or Delsym  For allergies and congestion, you can take Allegra, Claritin or Zyrtec  For your immune system, I recommend vitamin c, d and zinc. You can also take EmerGen-C or Airborne      You can also be seen virtually via [facility] at this link:  [email or web address]

## 2022-01-20 RX ORDER — CODEINE PHOSPHATE AND GUAIFENESIN 10; 100 MG/5ML; MG/5ML
5 SOLUTION ORAL EVERY 6 HOURS PRN
Qty: 180 ML | Refills: 0 | Status: SHIPPED | OUTPATIENT
Start: 2022-01-20 | End: 2022-01-30

## 2022-01-20 NOTE — TELEPHONE ENCOUNTER
----- Message from Familia Baron sent at 1/20/2022  1:27 PM CST -----  Regarding: resend rx to a different pharmacy  Type: Patient Call Back    Who called:Tata     What is the request in detail:Tata, wife of the patient, called to get a medication resent to a different pharmacy. She stated that her original pharmacy does not have the medication in stock. The name of the medication is: guaiFENesin-codeine 100-10 mg/5 ml (GUAIFENESIN AC)  mg/5 mL syrup . The patient would like it sent to the [facility] in [place]     Can the clinic reply by [facility]?no     Would the patient rather a call back or a response via [facility]? Call back     Best call back number:[phone]

## 2022-02-25 ENCOUNTER — IMMUNIZATION (OUTPATIENT)
Dept: PHARMACY | Facility: CLINIC | Age: 62
End: 2022-02-25
Payer: COMMERCIAL

## 2022-02-25 DIAGNOSIS — Z23 NEED FOR VACCINATION: Primary | ICD-10-CM

## 2022-03-16 DIAGNOSIS — Z12.11 COLON CANCER SCREENING: ICD-10-CM

## 2022-05-23 ENCOUNTER — PATIENT MESSAGE (OUTPATIENT)
Dept: SMOKING CESSATION | Facility: CLINIC | Age: 62
End: 2022-05-23
Payer: COMMERCIAL

## 2022-05-31 ENCOUNTER — PATIENT MESSAGE (OUTPATIENT)
Dept: ADMINISTRATIVE | Facility: HOSPITAL | Age: 62
End: 2022-05-31
Payer: COMMERCIAL

## 2022-06-13 ENCOUNTER — TELEPHONE (OUTPATIENT)
Dept: FAMILY MEDICINE | Facility: CLINIC | Age: 62
End: 2022-06-13
Payer: COMMERCIAL

## 2022-06-13 NOTE — TELEPHONE ENCOUNTER
----- Message from Renata Jalloh sent at 6/13/2022 10:01 AM CDT -----  Type: Patient Call Back    Who called: wife     What is the request in detail: Pt need a referral for dermatology     Can the clinic reply by [facility]?    Would the patient rather a call back or a response via [facility]?     Best call back number: [phone]

## 2022-06-13 NOTE — TELEPHONE ENCOUNTER
----- Message from Familia Baron sent at 6/13/2022  3:19 PM CDT -----  Regarding: call back in regards to dermatology referral  Type: Patient Call Back    Who called:Tata     What is the request in detail:Tata, wife of  the patient is returning a call back to the staff on behalf of the patient. It is in regards to a referral to dermatology. Please return call at earliest convenience.    Can the clinic reply by [facility]?no     Would the patient rather a call back or a response via [facility]? Call back     Best call back number:[phone]

## 2022-06-14 ENCOUNTER — TELEPHONE (OUTPATIENT)
Dept: FAMILY MEDICINE | Facility: CLINIC | Age: 62
End: 2022-06-14
Payer: COMMERCIAL

## 2022-06-14 DIAGNOSIS — D36.9 DERMOID CYST: Primary | ICD-10-CM

## 2022-06-14 NOTE — TELEPHONE ENCOUNTER
----- Message from Sera Smithnathan sent at 6/14/2022  9:47 AM CDT -----  Type: Patient Call Back    Who called Tata - wife    What is the request in detail: pt wants a dermatology appt needs referral    Can the clinic reply by [facility]?no    Would the patient rather a call back or a response via [facility]? call    Best call back number: [phone]

## 2022-06-20 DIAGNOSIS — M50.30 DEGENERATIVE CERVICAL DISC: ICD-10-CM

## 2022-06-20 DIAGNOSIS — M54.12 CERVICAL RADICULOPATHY: ICD-10-CM

## 2022-06-20 RX ORDER — CELECOXIB 200 MG/1
200 CAPSULE ORAL 2 TIMES DAILY
Qty: 180 CAPSULE | Refills: 0 | Status: SHIPPED | OUTPATIENT
Start: 2022-06-20 | End: 2022-09-21 | Stop reason: SDUPTHER

## 2022-06-20 NOTE — TELEPHONE ENCOUNTER
----- Message from Zaynab Vallejo sent at 6/20/2022 10:38 AM CDT -----  Type: RX Refill Request    Who Called:pt    Have you contacted your pharmacy:    Refill or New Rx:celecoxib (CELEBREX) 200 MG capsule    gabapentin (NEURONTIN) 800 MG tablet    RX Name and Strength:  How is the patient currently taking it? (ex. 1XDay):    Is this a 30 day or 90 day RX:    Preferred Pharmacy with phone number:  [address] [facility] [address] [facility]  [address] [place] [address]  Phone: [phone] Fax: [phone]        Local or Mail Order:    Ordering Provider:    Would the patient rather a call back or a response via [facility]? call    Best Call Back Number:[phone] (home)       Additional Information:

## 2022-06-21 RX ORDER — GABAPENTIN 800 MG/1
800 TABLET ORAL 3 TIMES DAILY
Qty: 270 TABLET | Refills: 0 | Status: SHIPPED | OUTPATIENT
Start: 2022-06-21 | End: 2022-09-21 | Stop reason: SDUPTHER

## 2022-06-21 NOTE — TELEPHONE ENCOUNTER
No new care gaps identified.  [facility] Embedded Care Gaps. Reference number: [id number]. 6/21/2022   7:59:46 AM CDT

## 2022-09-22 ENCOUNTER — PATIENT MESSAGE (OUTPATIENT)
Dept: FAMILY MEDICINE | Facility: CLINIC | Age: 62
End: 2022-09-22
Payer: COMMERCIAL

## 2022-09-23 ENCOUNTER — PATIENT MESSAGE (OUTPATIENT)
Dept: FAMILY MEDICINE | Facility: CLINIC | Age: 62
End: 2022-09-23
Payer: COMMERCIAL

## 2022-10-29 DIAGNOSIS — M50.30 DEGENERATIVE CERVICAL DISC: ICD-10-CM

## 2022-10-29 DIAGNOSIS — M54.12 CERVICAL RADICULOPATHY: ICD-10-CM

## 2022-10-29 NOTE — TELEPHONE ENCOUNTER
No new care gaps identified.  [facility] Embedded Care Gaps. Reference number: [id number]. 10/29/2022   12:21:56 PM CDT

## 2022-10-31 RX ORDER — GABAPENTIN 800 MG/1
800 TABLET ORAL 3 TIMES DAILY
Qty: 90 TABLET | Refills: 0 | OUTPATIENT
Start: 2022-10-31 | End: 2023-10-31

## 2022-10-31 RX ORDER — CELECOXIB 200 MG/1
200 CAPSULE ORAL 2 TIMES DAILY
Qty: 60 CAPSULE | Refills: 0 | OUTPATIENT
Start: 2022-10-31

## 2022-11-04 ENCOUNTER — TELEPHONE (OUTPATIENT)
Dept: FAMILY MEDICINE | Facility: CLINIC | Age: 62
End: 2022-11-04
Payer: COMMERCIAL

## 2022-11-04 DIAGNOSIS — Z00.00 ANNUAL PHYSICAL EXAM: Primary | ICD-10-CM

## 2022-11-04 NOTE — TELEPHONE ENCOUNTER
----- Message from Sid Eaton sent at 11/4/2022 10:33 AM CDT -----  Contact: pt [phone]  type: Lab    Caller is requesting to schedule their Lab appointment prior to annual appointment.  Order is not listed in EPIC.  Please enter order and contact patient to     Preferred Date and Time of Labs:11/5/22 morning    Date of Annual Physical Appointment:11/14/22    Where would they like the lab performed?[facility]    Would the patient rather a call back or a response via [facility]? portal

## 2022-11-04 NOTE — TELEPHONE ENCOUNTER
Please schedule lab prior to office visit   Please change Patient from cheryl cortés to me   He has not seen me since 2020

## 2022-11-07 ENCOUNTER — LAB VISIT (OUTPATIENT)
Dept: LAB | Facility: HOSPITAL | Age: 62
End: 2022-11-07
Attending: FAMILY MEDICINE
Payer: COMMERCIAL

## 2022-11-07 DIAGNOSIS — Z00.00 ANNUAL PHYSICAL EXAM: ICD-10-CM

## 2022-11-07 LAB
ALBUMIN SERPL BCP-MCNC: 4.2 G/DL (ref 3.5–5.2)
ALP SERPL-CCNC: 70 U/L (ref 55–135)
ALT SERPL W/O P-5'-P-CCNC: 19 U/L (ref 10–44)
ANION GAP SERPL CALC-SCNC: 12 MMOL/L (ref 8–16)
AST SERPL-CCNC: 20 U/L (ref 10–40)
BASOPHILS # BLD AUTO: 0.14 K/UL (ref 0–0.2)
BASOPHILS NFR BLD: 1.3 % (ref 0–1.9)
BILIRUB SERPL-MCNC: 0.6 MG/DL (ref 0.1–1)
BUN SERPL-MCNC: 14 MG/DL (ref 8–23)
CALCIUM SERPL-MCNC: 9.5 MG/DL (ref 8.7–10.5)
CHLORIDE SERPL-SCNC: 109 MMOL/L (ref 95–110)
CHOLEST SERPL-MCNC: 142 MG/DL (ref 120–199)
CHOLEST/HDLC SERPL: 3.3 {RATIO} (ref 2–5)
CO2 SERPL-SCNC: 23 MMOL/L (ref 23–29)
CREAT SERPL-MCNC: 0.8 MG/DL (ref 0.5–1.4)
DIFFERENTIAL METHOD: ABNORMAL
EOSINOPHIL # BLD AUTO: 0 K/UL (ref 0–0.5)
EOSINOPHIL NFR BLD: 0.4 % (ref 0–8)
ERYTHROCYTE [DISTWIDTH] IN BLOOD BY AUTOMATED COUNT: 13.6 % (ref 11.5–14.5)
EST. GFR  (NO RACE VARIABLE): >60 ML/MIN/1.73 M^2
ESTIMATED AVG GLUCOSE: 105 MG/DL (ref 68–131)
GLUCOSE SERPL-MCNC: 104 MG/DL (ref 70–110)
HBA1C MFR BLD: 5.3 % (ref 4–5.6)
HCT VFR BLD AUTO: 48 % (ref 40–54)
HDLC SERPL-MCNC: 43 MG/DL (ref 40–75)
HDLC SERPL: 30.3 % (ref 20–50)
HGB BLD-MCNC: 15.5 G/DL (ref 14–18)
IMM GRANULOCYTES # BLD AUTO: 0.02 K/UL (ref 0–0.04)
IMM GRANULOCYTES NFR BLD AUTO: 0.2 % (ref 0–0.5)
LDLC SERPL CALC-MCNC: 86.4 MG/DL (ref 63–159)
LYMPHOCYTES # BLD AUTO: 2.4 K/UL (ref 1–4.8)
LYMPHOCYTES NFR BLD: 22 % (ref 18–48)
MCH RBC QN AUTO: 29.1 PG (ref 27–31)
MCHC RBC AUTO-ENTMCNC: 32.3 G/DL (ref 32–36)
MCV RBC AUTO: 90 FL (ref 82–98)
MONOCYTES # BLD AUTO: 1.1 K/UL (ref 0.3–1)
MONOCYTES NFR BLD: 9.6 % (ref 4–15)
NEUTROPHILS # BLD AUTO: 7.3 K/UL (ref 1.8–7.7)
NEUTROPHILS NFR BLD: 66.5 % (ref 38–73)
NONHDLC SERPL-MCNC: 99 MG/DL
NRBC BLD-RTO: 0 /100 WBC
PLATELET # BLD AUTO: 335 K/UL (ref 150–450)
PMV BLD AUTO: 10.2 FL (ref 9.2–12.9)
POTASSIUM SERPL-SCNC: 3.7 MMOL/L (ref 3.5–5.1)
PROT SERPL-MCNC: 6.8 G/DL (ref 6–8.4)
RBC # BLD AUTO: 5.32 M/UL (ref 4.6–6.2)
SODIUM SERPL-SCNC: 144 MMOL/L (ref 136–145)
TRIGL SERPL-MCNC: 63 MG/DL (ref 30–150)
TSH SERPL DL<=0.005 MIU/L-ACNC: 1.94 UIU/ML (ref 0.4–4)
WBC # BLD AUTO: 10.96 K/UL (ref 3.9–12.7)

## 2022-11-07 PROCEDURE — 80061 LIPID PANEL: CPT | Performed by: FAMILY MEDICINE

## 2022-11-07 PROCEDURE — 84443 ASSAY THYROID STIM HORMONE: CPT | Performed by: FAMILY MEDICINE

## 2022-11-07 PROCEDURE — 85025 COMPLETE CBC W/AUTO DIFF WBC: CPT | Performed by: FAMILY MEDICINE

## 2022-11-07 PROCEDURE — 83036 HEMOGLOBIN GLYCOSYLATED A1C: CPT | Performed by: FAMILY MEDICINE

## 2022-11-07 PROCEDURE — 36415 COLL VENOUS BLD VENIPUNCTURE: CPT | Mod: PO | Performed by: FAMILY MEDICINE

## 2022-11-07 PROCEDURE — 80053 COMPREHEN METABOLIC PANEL: CPT | Performed by: FAMILY MEDICINE

## 2022-11-14 ENCOUNTER — OFFICE VISIT (OUTPATIENT)
Dept: FAMILY MEDICINE | Facility: CLINIC | Age: 62
End: 2022-11-14
Payer: COMMERCIAL

## 2022-11-14 VITALS
HEIGHT: 73 IN | SYSTOLIC BLOOD PRESSURE: 130 MMHG | TEMPERATURE: 98 F | BODY MASS INDEX: 35.03 KG/M2 | HEART RATE: 70 BPM | WEIGHT: 264.31 LBS | DIASTOLIC BLOOD PRESSURE: 70 MMHG | OXYGEN SATURATION: 96 %

## 2022-11-14 DIAGNOSIS — E66.9 OBESITY (BMI 30.0-34.9): ICD-10-CM

## 2022-11-14 DIAGNOSIS — M50.30 DEGENERATIVE CERVICAL DISC: ICD-10-CM

## 2022-11-14 DIAGNOSIS — Z79.891 CHRONIC USE OF OPIATE DRUG FOR THERAPEUTIC PURPOSE: ICD-10-CM

## 2022-11-14 DIAGNOSIS — M47.22 OSTEOARTHRITIS OF SPINE WITH RADICULOPATHY, CERVICAL REGION: ICD-10-CM

## 2022-11-14 DIAGNOSIS — M54.12 CERVICAL RADICULOPATHY: ICD-10-CM

## 2022-11-14 DIAGNOSIS — R06.2 WHEEZING: ICD-10-CM

## 2022-11-14 DIAGNOSIS — Z00.00 ANNUAL PHYSICAL EXAM: Primary | ICD-10-CM

## 2022-11-14 PROCEDURE — 3008F BODY MASS INDEX DOCD: CPT | Mod: CPTII,S$GLB,, | Performed by: FAMILY MEDICINE

## 2022-11-14 PROCEDURE — 3075F PR MOST RECENT SYSTOLIC BLOOD PRESS GE 130-139MM HG: ICD-10-PCS | Mod: CPTII,S$GLB,, | Performed by: FAMILY MEDICINE

## 2022-11-14 PROCEDURE — 99396 PREV VISIT EST AGE 40-64: CPT | Mod: S$GLB,,, | Performed by: FAMILY MEDICINE

## 2022-11-14 PROCEDURE — 1159F PR MEDICATION LIST DOCUMENTED IN MEDICAL RECORD: ICD-10-PCS | Mod: CPTII,S$GLB,, | Performed by: FAMILY MEDICINE

## 2022-11-14 PROCEDURE — 3078F PR MOST RECENT DIASTOLIC BLOOD PRESSURE < 80 MM HG: ICD-10-PCS | Mod: CPTII,S$GLB,, | Performed by: FAMILY MEDICINE

## 2022-11-14 PROCEDURE — 3075F SYST BP GE 130 - 139MM HG: CPT | Mod: CPTII,S$GLB,, | Performed by: FAMILY MEDICINE

## 2022-11-14 PROCEDURE — 3044F PR MOST RECENT HEMOGLOBIN A1C LEVEL <7.0%: ICD-10-PCS | Mod: CPTII,S$GLB,, | Performed by: FAMILY MEDICINE

## 2022-11-14 PROCEDURE — 3008F PR BODY MASS INDEX (BMI) DOCUMENTED: ICD-10-PCS | Mod: CPTII,S$GLB,, | Performed by: FAMILY MEDICINE

## 2022-11-14 PROCEDURE — 1160F RVW MEDS BY RX/DR IN RCRD: CPT | Mod: CPTII,S$GLB,, | Performed by: FAMILY MEDICINE

## 2022-11-14 PROCEDURE — 1160F PR REVIEW ALL MEDS BY PRESCRIBER/CLIN PHARMACIST DOCUMENTED: ICD-10-PCS | Mod: CPTII,S$GLB,, | Performed by: FAMILY MEDICINE

## 2022-11-14 PROCEDURE — 3044F HG A1C LEVEL LT 7.0%: CPT | Mod: CPTII,S$GLB,, | Performed by: FAMILY MEDICINE

## 2022-11-14 PROCEDURE — 99396 PR PREVENTIVE VISIT,EST,40-64: ICD-10-PCS | Mod: S$GLB,,, | Performed by: FAMILY MEDICINE

## 2022-11-14 PROCEDURE — 3078F DIAST BP <80 MM HG: CPT | Mod: CPTII,S$GLB,, | Performed by: FAMILY MEDICINE

## 2022-11-14 PROCEDURE — 99999 PR PBB SHADOW E&M-EST. PATIENT-LVL IV: CPT | Mod: PBBFAC,,, | Performed by: FAMILY MEDICINE

## 2022-11-14 PROCEDURE — 99999 PR PBB SHADOW E&M-EST. PATIENT-LVL IV: ICD-10-PCS | Mod: PBBFAC,,, | Performed by: FAMILY MEDICINE

## 2022-11-14 PROCEDURE — 1159F MED LIST DOCD IN RCRD: CPT | Mod: CPTII,S$GLB,, | Performed by: FAMILY MEDICINE

## 2022-11-14 RX ORDER — HYDROCODONE BITARTRATE AND ACETAMINOPHEN 5; 325 MG/1; MG/1
1 TABLET ORAL EVERY 12 HOURS PRN
Qty: 60 TABLET | Refills: 0 | Status: SHIPPED | OUTPATIENT
Start: 2023-01-11 | End: 2023-02-09

## 2022-11-14 RX ORDER — ALBUTEROL SULFATE 90 UG/1
2 AEROSOL, METERED RESPIRATORY (INHALATION) EVERY 6 HOURS PRN
Qty: 18 G | Refills: 0 | Status: SHIPPED | OUTPATIENT
Start: 2022-11-14 | End: 2023-09-11

## 2022-11-14 RX ORDER — HYDROCODONE BITARTRATE AND ACETAMINOPHEN 5; 325 MG/1; MG/1
1 TABLET ORAL EVERY 12 HOURS PRN
Qty: 60 TABLET | Refills: 0 | Status: SHIPPED | OUTPATIENT
Start: 2022-11-14 | End: 2023-02-12 | Stop reason: SDUPTHER

## 2022-11-14 RX ORDER — CELECOXIB 200 MG/1
200 CAPSULE ORAL 2 TIMES DAILY
Qty: 180 CAPSULE | Refills: 1 | Status: SHIPPED | OUTPATIENT
Start: 2022-11-14 | End: 2023-04-03

## 2022-11-14 RX ORDER — GABAPENTIN 800 MG/1
800 TABLET ORAL 3 TIMES DAILY
Qty: 270 TABLET | Refills: 1 | Status: SHIPPED | OUTPATIENT
Start: 2022-11-14 | End: 2023-05-19 | Stop reason: SDUPTHER

## 2022-11-14 RX ORDER — HYDROCODONE BITARTRATE AND ACETAMINOPHEN 5; 325 MG/1; MG/1
1 TABLET ORAL EVERY 12 HOURS PRN
Qty: 60 TABLET | Refills: 0 | Status: SHIPPED | OUTPATIENT
Start: 2022-12-13 | End: 2023-01-11

## 2022-11-14 RX ORDER — HYDROCODONE BITARTRATE AND ACETAMINOPHEN 5; 325 MG/1; MG/1
1 TABLET ORAL EVERY 12 HOURS PRN
Qty: 60 TABLET | Refills: 0 | Status: SHIPPED | OUTPATIENT
Start: 2023-02-09 | End: 2023-03-10

## 2022-11-14 NOTE — PROGRESS NOTES
"Routine Office Visit    Srinivasan Munoz  1960  [id number]      Subjective     Srinivasan is a 62 y.o. male who presents today for:    Annual physical   Shoulder pain - chronic condition for patient - Patient has been evaluated by ortho and pain management. 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 He has severe pain. It limits his range of motion. Patient is currently on gabapentin and celebrex and continues to have severe pain. He is requesting to be placed back on hydrocodone - he was on this in the past.     Objective     Review of Systems   Constitutional:  Negative for chills and fever.   HENT:  Negative for congestion.    Eyes:  Negative for blurred vision.   Respiratory:  Negative for cough.    Cardiovascular:  Negative for chest pain.   Gastrointestinal:  Negative for abdominal pain, constipation, diarrhea, heartburn, nausea and vomiting.   Genitourinary:  Negative for dysuria.   Musculoskeletal:  Negative for myalgias.   Skin:  Negative for itching and rash.   Neurological:  Negative for dizziness and headaches.   Psychiatric/Behavioral:  Negative for depression.      /70 (Patient Position: Sitting, BP Method: Large (Manual))   Pulse 70   Temp 98.4 °F (36.9 °C) (Oral)   Ht 6' 1" (1.854 m)   Wt 119.9 kg (264 lb 5.3 oz)   SpO2 96%   BMI 34.87 kg/m²   Physical Exam  Constitutional:       Appearance: He is well-developed.   HENT:      Head: Normocephalic and atraumatic.   Eyes:      Conjunctiva/sclera: Conjunctivae normal.      Pupils: Pupils are equal, round, and reactive to light.   Neck:      Thyroid: No thyromegaly.      Vascular: No JVD.   Cardiovascular:      Rate and Rhythm: Normal rate and regular rhythm.      Heart sounds: Normal heart sounds.   Pulmonary:      Effort: Pulmonary effort is normal.      Breath sounds: Normal breath sounds. No wheezing.   Abdominal:      General: Bowel sounds are normal. There is no distension.     "  Palpations: Abdomen is soft.      Tenderness: There is no abdominal tenderness. There is no guarding.   Musculoskeletal:         General: Normal range of motion.      Cervical back: Normal range of motion and neck supple.   Lymphadenopathy:      Cervical: No cervical adenopathy.   Skin:     General: Skin is warm and dry.   Neurological:      Mental Status: He is alert and oriented to person, place, and time.   Psychiatric:         Behavior: Behavior normal.           Assessment     Health Maintenance         Date Due Completion Date    Pneumococcal Vaccines (Age 0-64) (1 - PCV) Never done ---    HIV Screening Never done ---    Colorectal Cancer Screening Never done ---    LDCT Lung Screen Never done ---    COVID-19 Vaccine (4 - Booster for Moderna series) 04/22/2022 2/25/2022    Influenza Vaccine (1) 09/01/2022 10/10/2019    TETANUS VACCINE 10/24/2027 10/24/2017    Lipid Panel 11/07/2027 11/7/2022              Problem List Items Addressed This Visit          Neuro    Cervical radiculopathy    Relevant Medications    celecoxib (CELEBREX) 200 MG capsule  The current medical regimen is effective;  continue present plan and medications.      Degenerative cervical disc    Overview     With radiculopathy  Pt previously on gabapentin; would like to restart  Patient has chronic pain involving the shoulder, lumbar spine.  History of trauma? No.  Onset: Many years ago.  Frequency: persistent.  Progression since onset: worsening.  Pain quality: 5/10.  Current treatment: Lortab ( Hydrocodone/Acetaminophen) and Norco, Neurontin (Gabapentin)  Improvement on current treatment: moderate  Treatments tried:  Norco, Neurontin (Gabapentin); Therapy: None; Injections: Epidural injection (steroid ) - with mild improvement  Associated symptoms: leg pain  Previous imaging: X-ray and None   Drug screen? Yes  Pain contract? No  Psychiatric disorders: None  Substance abuse history:  None  Social History: None         Relevant Medications     gabapentin (NEURONTIN) 800 MG tablet    Osteoarthritis of spine with radiculopathy, cervical region     Other Visit Diagnoses       Annual physical exam    -  Primary  I addressed all major concerns as it related to health maintenance.  All were ordered and scheduled based on the patients wishes.  Any additional health maintenance will be readdressed at the next physical if declined or deferred by the patient.      Wheezing        Relevant Medications    albuterol (PROVENTIL/VENTOLIN HFA) 90 mcg/actuation inhaler    Obesity (BMI 30.0-34.9)      The patient is asked to make an attempt to improve diet and exercise patterns to aid in medical management of this problem.      Chronic use of opiate drug for therapeutic purpose                      Follow up in about 4 months (around 3/14/2023).

## 2022-12-12 ENCOUNTER — OFFICE VISIT (OUTPATIENT)
Dept: FAMILY MEDICINE | Facility: CLINIC | Age: 62
End: 2022-12-12
Payer: COMMERCIAL

## 2022-12-12 ENCOUNTER — TELEPHONE (OUTPATIENT)
Dept: FAMILY MEDICINE | Facility: CLINIC | Age: 62
End: 2022-12-12

## 2022-12-12 VITALS
BODY MASS INDEX: 35.63 KG/M2 | SYSTOLIC BLOOD PRESSURE: 120 MMHG | HEART RATE: 61 BPM | DIASTOLIC BLOOD PRESSURE: 64 MMHG | WEIGHT: 270.06 LBS | OXYGEN SATURATION: 95 % | TEMPERATURE: 98 F

## 2022-12-12 DIAGNOSIS — R05.9 COUGH IN ADULT: Primary | ICD-10-CM

## 2022-12-12 DIAGNOSIS — Z72.0 TOBACCO ABUSE: ICD-10-CM

## 2022-12-12 DIAGNOSIS — M54.2 CERVICAL PAIN: ICD-10-CM

## 2022-12-12 DIAGNOSIS — G89.29 CHRONIC LEFT SHOULDER PAIN: ICD-10-CM

## 2022-12-12 DIAGNOSIS — R09.81 SINUS CONGESTION: ICD-10-CM

## 2022-12-12 DIAGNOSIS — G47.8 AWAKENS FROM SLEEP AT NIGHT: ICD-10-CM

## 2022-12-12 DIAGNOSIS — R06.83 SNORING: ICD-10-CM

## 2022-12-12 DIAGNOSIS — M25.512 CHRONIC LEFT SHOULDER PAIN: ICD-10-CM

## 2022-12-12 LAB
CTP QC/QA: YES
POC MOLECULAR INFLUENZA A AGN: NEGATIVE
POC MOLECULAR INFLUENZA B AGN: NEGATIVE
SARS-COV-2 RNA RESP QL NAA+PROBE: NOT DETECTED

## 2022-12-12 PROCEDURE — U0005 INFEC AGEN DETEC AMPLI PROBE: HCPCS | Performed by: NURSE PRACTITIONER

## 2022-12-12 PROCEDURE — 3074F SYST BP LT 130 MM HG: CPT | Mod: CPTII,S$GLB,, | Performed by: NURSE PRACTITIONER

## 2022-12-12 PROCEDURE — 99406 BEHAV CHNG SMOKING 3-10 MIN: CPT | Mod: S$GLB,,, | Performed by: NURSE PRACTITIONER

## 2022-12-12 PROCEDURE — 3078F DIAST BP <80 MM HG: CPT | Mod: CPTII,S$GLB,, | Performed by: NURSE PRACTITIONER

## 2022-12-12 PROCEDURE — 3008F PR BODY MASS INDEX (BMI) DOCUMENTED: ICD-10-PCS | Mod: CPTII,S$GLB,, | Performed by: NURSE PRACTITIONER

## 2022-12-12 PROCEDURE — 99214 OFFICE O/P EST MOD 30 MIN: CPT | Mod: 25,S$GLB,, | Performed by: NURSE PRACTITIONER

## 2022-12-12 PROCEDURE — 1159F MED LIST DOCD IN RCRD: CPT | Mod: CPTII,S$GLB,, | Performed by: NURSE PRACTITIONER

## 2022-12-12 PROCEDURE — 87502 INFLUENZA DNA AMP PROBE: CPT | Mod: QW,S$GLB,, | Performed by: NURSE PRACTITIONER

## 2022-12-12 PROCEDURE — 1159F PR MEDICATION LIST DOCUMENTED IN MEDICAL RECORD: ICD-10-PCS | Mod: CPTII,S$GLB,, | Performed by: NURSE PRACTITIONER

## 2022-12-12 PROCEDURE — 3044F PR MOST RECENT HEMOGLOBIN A1C LEVEL <7.0%: ICD-10-PCS | Mod: CPTII,S$GLB,, | Performed by: NURSE PRACTITIONER

## 2022-12-12 PROCEDURE — U0003 INFECTIOUS AGENT DETECTION BY NUCLEIC ACID (DNA OR RNA); SEVERE ACUTE RESPIRATORY SYNDROME CORONAVIRUS 2 (SARS-COV-2) (CORONAVIRUS DISEASE [COVID-19]), AMPLIFIED PROBE TECHNIQUE, MAKING USE OF HIGH THROUGHPUT TECHNOLOGIES AS DESCRIBED BY CMS-2020-01-R: HCPCS | Performed by: NURSE PRACTITIONER

## 2022-12-12 PROCEDURE — 99999 PR PBB SHADOW E&M-EST. PATIENT-LVL V: CPT | Mod: PBBFAC,,, | Performed by: NURSE PRACTITIONER

## 2022-12-12 PROCEDURE — 3074F PR MOST RECENT SYSTOLIC BLOOD PRESSURE < 130 MM HG: ICD-10-PCS | Mod: CPTII,S$GLB,, | Performed by: NURSE PRACTITIONER

## 2022-12-12 PROCEDURE — 87502 POCT INFLUENZA A/B MOLECULAR: ICD-10-PCS | Mod: QW,S$GLB,, | Performed by: NURSE PRACTITIONER

## 2022-12-12 PROCEDURE — 3078F PR MOST RECENT DIASTOLIC BLOOD PRESSURE < 80 MM HG: ICD-10-PCS | Mod: CPTII,S$GLB,, | Performed by: NURSE PRACTITIONER

## 2022-12-12 PROCEDURE — 99406 PR TOBACCO USE CESSATION INTERMEDIATE 3-10 MINUTES: ICD-10-PCS | Mod: S$GLB,,, | Performed by: NURSE PRACTITIONER

## 2022-12-12 PROCEDURE — 3044F HG A1C LEVEL LT 7.0%: CPT | Mod: CPTII,S$GLB,, | Performed by: NURSE PRACTITIONER

## 2022-12-12 PROCEDURE — 99214 PR OFFICE/OUTPT VISIT, EST, LEVL IV, 30-39 MIN: ICD-10-PCS | Mod: 25,S$GLB,, | Performed by: NURSE PRACTITIONER

## 2022-12-12 PROCEDURE — 99999 PR PBB SHADOW E&M-EST. PATIENT-LVL V: ICD-10-PCS | Mod: PBBFAC,,, | Performed by: NURSE PRACTITIONER

## 2022-12-12 PROCEDURE — 3008F BODY MASS INDEX DOCD: CPT | Mod: CPTII,S$GLB,, | Performed by: NURSE PRACTITIONER

## 2022-12-12 RX ORDER — GUAIFENESIN 600 MG/1
600 TABLET, EXTENDED RELEASE ORAL 2 TIMES DAILY PRN
Qty: 14 TABLET | Refills: 0 | Status: SHIPPED | OUTPATIENT
Start: 2022-12-12 | End: 2023-09-06

## 2022-12-12 RX ORDER — ASPIRIN/CALCIUM CARB/MAGNESIUM 325 MG
4 TABLET ORAL
Qty: 24 LOZENGE | Refills: 3 | COMMUNITY
Start: 2022-12-12 | End: 2023-01-26 | Stop reason: SDUPTHER

## 2022-12-12 RX ORDER — BENZONATATE 200 MG/1
200 CAPSULE ORAL 3 TIMES DAILY PRN
Qty: 15 CAPSULE | Refills: 0 | Status: SHIPPED | OUTPATIENT
Start: 2022-12-12 | End: 2023-09-06

## 2022-12-12 NOTE — TELEPHONE ENCOUNTER
----- Message from Triston Sandoval NP sent at 12/12/2022 11:43 AM CST -----  Good day,    Please call and inform the patient of their negative test result. Thank you for completing this task for me and all that you do for our team.     I hope you the rest of your day is pleasant.   ----- Message -----  From: James Buchanan MA  Sent: 12/12/2022   9:50 AM CST  To: Triston Sandoval NP

## 2022-12-12 NOTE — PATIENT INSTRUCTIONS
//////////PHONE NUMBERS//////////    Bariatrics---[phone]  Breast Surgery---[phone]  Case Management---[phone]  Colonoscopy---[phone]  Imaging, Xray, CT, MRI, Ultrasound---[phone]  Infectious Disease---[phone]  Interventional Radiology---[phone]  Medical Records---[phone]  [facility] On Call---[phone]  DME---[phone]  Optometry/Ophthalmology---[phone]  [facility]---[phone]  Physical Therapy---[phone]  Psychiatry---[phone]  Plastic Surgery---[phone]  Sleep Study---[phone]  Smoking Cessation---[phone]  Vaccine Hotline---[phone]  Wound Care---[phone]  COVID Vaccine center---[phone]  Referral Desk---[phone]    /////////////////////////////////////////////////    Patient Education       Quitting Smoking   The Basics   Written by the doctors and editors at [facility]   What are the benefits of quitting smoking? -- Quitting smoking can dramatically improve your health and help you live longer. It lowers your risk of heart disease, lung disease, kidney failure, infection, and cancer.   Quitting smoking can also lower your chances of getting osteoporosis, a condition that makes your bones weak. Plus, it can help your skin look younger and reduce the chances that you will have problems with sex.  Quitting is not easy for most people, and it can take several tries to completely quit. But help and support are available. Quitting smoking will improve your health no matter how old you are, even if you have smoked for a long time.   What should I do if I want to quit smoking? -- It's a good idea to start by talking with your doctor or nurse. It is possible to quit on your own, without help. But getting help greatly increases your chances of quitting successfully.  When you are ready to quit, you will make a plan to:  Set a quit date  Tell your family and friends that you plan to quit  Plan ahead for the challenges you will face, such as  "cigarette cravings  Remove cigarettes from your home, car, and work  How can my doctor or nurse help? -- Your doctor or nurse can give you advice on the best way to quit. They can also give you medicines to:  Reduce your craving for cigarettes  Reduce your "withdrawal" symptoms (symptoms that happen when you stop smoking)  Your doctor or nurse can also help you find a counselor to talk to. For most people who are trying to quit smoking, it works best to use both medicines and counseling.  You can also get help from a free phone line (0-589-QUIT-NOW or 1-276.557.9721) or go online to www.smokefree.gov.  What are the symptoms of withdrawal? -- When you stop smoking, you might have symptoms such as:  Trouble sleeping  Feeling irritable, anxious, or restless  Getting frustrated or angry  Having trouble thinking clearly  These symptoms can be hard to deal with, which is why it can be so hard to quit. But medicines can help.  Some people who stop smoking become temporarily depressed. Some people need treatment for depression, such as counseling or medicines or both. People with depression might:  No longer enjoy or care about doing the things they used to like to do  Feel sad, down, hopeless, nervous, or cranky most of the day, almost every day  Lose or gain weight  Sleep too much or too little  Feel tired or like they have no energy  Feel guilty or like they are worth nothing  Forget things or feel confused  Move and speak more slowly than usual  Act restless or have trouble staying still  Think about death or suicide  If you think you might be depressed, tell your doctor or nurse right away. They can talk to you about your symptoms and recommend treatment if needed.  If you ever feel like you might hurt yourself, go straight to the nearest emergency department or call for an ambulance (in the [place] and [place], dial 9-1-1). You can also call your doctor or nurse and tell them it is an emergency, or reach the [place] National " Suicide Prevention Lifeline at 1-179.415.6675 or www.suicidepreventionlifeline.org.  How does counseling work? -- A counselor can help you figure out:  What triggers you to want to smoke, and how to handle these situations  How to resist cravings  What you can do differently if you have tried to quit before  You can meet with a counselor in one-on-one sessions or as part of a group. There are other ways to get counseling, too, such as over the phone, through text messaging, or online.   How do medicines help you stop smoking? -- Different medicines work in different ways:  Nicotine replacement therapy - Nicotine is the main drug in cigarettes, and the reason they are addictive. These medicines reduce your body's craving for nicotine. They also help with withdrawal symptoms.  There are different forms of nicotine replacement, including skin patches, lozenges, gum, nasal sprays, and inhalers. Most can be bought without a prescription. Also, health insurance might cover some or all of the cost.  It often helps to use 2 forms of nicotine replacement. For example, you might wear a patch all the time, plus use gum or lozenges when you get a craving to smoke.  Varenicline - Varenicline (brand names: Chantix, Champix) is a prescription medicine that reduces withdrawal symptoms and cigarette cravings. Varenicline can increase the effects of alcohol in some people. It's a good idea to limit drinking while you're taking it, at least until you know how it affects you.  Even if you are not yet ready to commit to a quit date, varenicline can help reduce cravings. This can make it easier to quit when you are ready.  Bupropion - Bupropion (sample brand names: Wellbutrin, Zyban) is a prescription medicine that reduces your desire to smoke. It is also available in a generic version, which is cheaper than the brand name medicines. Doctors do not usually prescribe bupropion for people with seizures or who have had seizures in the  "past.  It might also be helpful to combine nicotine replacement with bupropion or varenicline. In some cases, a person might even take both bupropion and varenicline. Your doctor or nurse can help you figure out the best combination for you.  What about e-cigarettes? -- Sometimes people wonder if using electronic cigarettes, or "e-cigarettes," can help them quit smoking. Using e-cigarettes is also called "vaping." Doctors do not recommend e-cigarettes in place of medicines and counseling. That's because e-cigarettes still contain nicotine as well as other substances that might be harmful. It's also not clear how they can affect a person's health in the long term.  What if I am pregnant and I smoke? -- If you are pregnant, it's really important for the health of your baby that you quit. Ask your doctor what options you have, and what is safest for your baby.  Will I gain weight if I quit? -- You might gain a few pounds. This can be frustrating for some people, but it's important to remember that you are improving your health by quitting smoking. You can help prevent gaining a lot of weight by staying active and eating a healthy diet.  What if I am not able to quit? -- If you don't quit on your first try, or if you quit but then start smoking again, don't give up hope. Lots of people have to try more than once before they are able to completely quit.  It might help to try to understand why quitting did not work. There might be something you can do differently when you try again. It can help to figure out what situations make you want to smoke, so you can avoid them.   What else can I do to improve my chances of quitting? -- You can:  Get regular exercise. Any type of physical activity, even gentle forms of movement, is good for your health. Physical activity can also help reduce stress.  Stay away from smokers and places that make you want to smoke. If people close to you smoke, ask them to quit with you or avoid " smoking around you.  Carry gum, hard candy, or something to put in your mouth. If you get a craving for a cigarette, try one of these instead.  Don't give up, even if you start smoking again. It takes most people a few tries before they succeed.  All topics are updated as new evidence becomes available and our peer review process is complete.  This topic retrieved from [employer] on: Sep 21, 2021.  Topic 99385 Version 22.0  Release: 29.4.2 - C29.263  © 2021 UpToDate, Inc. and/or its affiliates. All rights reserved.  Consumer Information Use and Disclaimer   This information is not specific medical advice and does not replace information you receive from your health care provider. This is only a brief summary of general information. It does NOT include all information about conditions, illnesses, injuries, tests, procedures, treatments, therapies, discharge instructions or life-style choices that may apply to you. You must talk with your health care provider for complete information about your health and treatment options. This information should not be used to decide whether or not to accept your health care provider's advice, instructions or recommendations. Only your health care provider has the knowledge and training to provide advice that is right for you. The use of this information is governed by the [employer] End User License Agreement, available at https://www.[employer])/en/solutions/[employer]/about/attila.The use of [employer] content is governed by the [employer] Terms of Use. ©2021 UpToDate, Inc. All rights reserved.  Copyright   © 2021 UpToDate, Inc. and/or its affiliates. All rights reserved.  Patient Education       Viral Upper Respiratory Infection Discharge Instructions, Adult   About this topic   You have an upper respiratory infection or URI. A URI can affect your nose, throat, ears, and sinuses. A virus is the cause of almost all URIs and antibiotics will not help you feel better more quickly. The  common cold is an example of a viral URI.  URIs are easy to spread from person to person, most often through coughing or sneezing. A URI will almost always get better in a week or two without any treatment.         What care is needed at home?   Ask your doctor what you need to do when you go home. Make sure you ask questions if you do not understand what the doctor says.  If you smoke, try to quit. Your doctor or nurse can help.  Drink lots of fluids like water, juice, or broth. This will help replace any fluids lost if you have a runny nose or fever. Warm tea or soup can help soothe a sore throat.  If the air in your home feels dry, use a cool mist humidifier. This can help a stuffy nose and make it easier to breathe.  You can also use saline nose drops to relieve stuffiness.  If you decide to take over-the-counter cough or cold medicines, follow the directions on the label carefully. Be sure you do not take more than 1 medicine that contains acetaminophen. Also, if you have a heart problem or high blood pressure, check with your doctor before you take any of these medicines.  Wash your hands often. Cough or sneeze into a tissue or your elbow instead of your hands. This will help keep others healthy.  What follow-up care is needed?   Your doctor may ask you to make visits to the office to check on your progress. Be sure to keep these visits.  What drugs may be needed?   The doctor may order drugs to:  Open up the tubes of your lungs  Treat viral infection  Relieve or stop coughing  Help with pain from a sore throat  Relieve runny and stuffy nose  Provide oxygen  Will physical activity be limited?   You need to rest for a few days to let your body recover from the infection.  What changes to diet are needed?   Eat soft foods like soup if swallowing is too painful.  What problems could happen?   Asthma attack  Sinus infections  Lung problems like pneumonia and bronchitis  Severe fluid loss. This is  dehydration.  What can be done to prevent this health problem?   Wash your hands often with soap and water for at least 20 seconds, especially after coughing or sneezing. Alcohol-based hand sanitizers also work to kill the virus.  If you are sick, cover your mouth and nose with tissue when you cough or sneeze. You can also cough into your elbow. Throw away tissues in the trash and wash your hands after touching used tissues.  Do not get too close (kissing, hugging) to people who are sick.  Do not share towels or hankies with anyone who is sick. Clean commonly handled things like door handles, remotes, toys, and phones. Wipe them with a disinfectant.  Stay away from crowded places.  Cover your nose and mouth when you sneeze or cough.  Take vitamin C to help build up your body's ability to fight disease.  Get a flu shot each year.  When do I need to call the doctor?   You have trouble breathing when talking or sitting still.  You have a fever of 100.4°F (38°C) or higher for several days, chills, a very bad sore throat, or ear or sinus pain.  You develop a new fever after several days of feeling the same or improving.  You develop chest pain when you cough.  You have a cough that lasts more than 10 days.  You cough up blood, or the color of the mucus you cough up changes.  Teach Back: Helping You Understand   The Teach Back Method helps you understand the information we are giving you. After you talk with the staff, tell them in your own words what you learned. This helps to make sure the staff has described each thing clearly. It also helps to explain things that may have been confusing. Before going home, make sure you can do these:  I can tell you about my condition.  I can tell you what may help ease my signs.  I can tell you what I will do if I have a fever, chills, breathing very fast, or trouble breathing.  Where can I learn more?   American Lung  Association  https://www.lung.org/blog/can-you-exercise-with-a-cold   American Lung Association  https://www.lung.org/lung-health-diseases/lung-disease-lookup/influenza/facts-about-the-common-cold   NHS Choices  https://www.nhs.uk/conditions/respiratory-tract-infection/   UpToDate  https://www.[employer]/contents/the-common-cold-in-adults-beyond-the-basics   Last Reviewed Date   2021-06-08  Consumer Information Use and Disclaimer   This information is not specific medical advice and does not replace information you receive from your health care provider. This is only a brief summary of general information. It does NOT include all information about conditions, illnesses, injuries, tests, procedures, treatments, therapies, discharge instructions or life-style choices that may apply to you. You must talk with your health care provider for complete information about your health and treatment options. This information should not be used to decide whether or not to accept your health care providers advice, instructions or recommendations. Only your health care provider has the knowledge and training to provide advice that is right for you.  Copyright   Copyright © 2021 UpToDate, Inc. and its affiliates and/or licensors. All rights reserved.  Patient Education       Sinusitis Discharge Instructions, Adult   About this topic   Your sinuses are hollow areas in the bones of your face. They have a thin lining that normally makes a small amount of mucus. When you have sinusitis, the lining gets swollen and makes extra mucus. You may have sinusitis with or after a cold. Most of the time sinusitis will get better in 1 to 2 weeks.  Sinusitis is most often caused by a virus, so antibiotics wont help. But some people do need antibiotics. If the doctor ordered antibiotics for you, be sure to follow the instructions. It is important to take all of your antibiotics even if you start to feel better.       What care is needed at  home?   Ask your doctor what you need to do when you go home. Make sure you ask questions if you do not understand what you need to do.  Try to thin the mucus.  Drink lots of liquids to stay hydrated.  Use a cool mist humidifier to avoid dry air.  Use saline nose drops or a saline nose rinse to relieve stuffiness.  Wash your hands often. This will help keep others healthy.  Do not smoke or be in smoke-filled places. Avoid things that may cause breathing problems like fumes, pollution, dust, and other common allergens and irritants.  You may want to take medicine like ibuprofen, naproxen, or acetaminophen to help with pain.  What follow-up care is needed?   Your doctor may ask you to make visits to the office to check on your progress. Be sure to keep your visits.  Your doctor will tell you if other tests are needed.  Your doctor may send you for allergy tests or to an allergy expert.   What lifestyle changes are needed?   Avoid drinks that contain caffeine or alcohol.  Try to stop smoking. Avoid being around others who smoke. Smoke can damage the small hairs inside your sinuses.  What drugs may be needed?   The doctor may order drugs to:  Help with pain and swelling  Fight an infection  Control coughing  Dry up the sinuses  Help a runny or stuffy nose  Will physical activity be limited?   You do not have to limit your activity. You may want to rest more if you have a fever or headache.   What problems could happen?   Infections that happen again and again  Asthma attack  Coughing  Loss of voice  What can be done to prevent this health problem?   Keep your nose as moist as possible. Use saline sprays, washes, and a humidifier often.  Avoid being around cigarette and cigar smoke or strong odors from chemicals.  Avoid long periods of swimming in pools treated with chlorine. This can bother the lining of the nose and sinuses.  Avoid water diving. This forces water into the sinuses from the nasal passages.  Manage your  allergies with your doctor's help.  Use an air conditioner if allergies are a problem.  Before air travel, use a drug to dry up mucus. As the plane takes off or lands, the pressure can cause sinus pain.  When do I need to call the doctor?   You have a stiff neck, especially if you also have fever, chills, vomiting, or severe headache  You have trouble thinking clearly.  You have trouble seeing or have double vision.  You have swelling or redness or pain around one or both eyes.  You have a fever of 102°F (38.9°C) or higher, or have shaking chills or sweats.  You have an upset stomach and throwing up.  You have more pain in your face and head.  You are not getting better within 1 to 2 weeks.  Teach Back: Helping You Understand   The Teach Back Method helps you understand the information we are giving you. After you talk with the staff, tell them in your own words what you learned. This helps to make sure the staff has covered each thing clearly. It also helps to explain things that may have been confusing. Before going home, make sure you can do these:  I can tell you about my condition.  I can tell you what may help ease my breathing.  I can tell you what I will do if I have a fever, chills, or trouble breathing.  Where can I learn more?   American Academy of Family Physicians  https://familydoctor.org/condition/sinusitis/   Centers for Disease Control and Prevention  https://www.cdc.gov/antibiotic-use/community/for-hcp/outpatient-hcp/adult-treatment-rec.html   Last Reviewed Date   2021-06-09  Consumer Information Use and Disclaimer   This information is not specific medical advice and does not replace information you receive from your health care provider. This is only a brief summary of general information. It does NOT include all information about conditions, illnesses, injuries, tests, procedures, treatments, therapies, discharge instructions or life-style choices that may apply to you. You must talk with your  "health care provider for complete information about your health and treatment options. This information should not be used to decide whether or not to accept your health care providers advice, instructions or recommendations. Only your health care provider has the knowledge and training to provide advice that is right for you.  Copyright   Copyright © 2021 UpToDate, Inc. and its affiliates and/or licensors. All rights reserved.  Patient Education       COVID-19 Overview   The Basics   Written by the doctors and editors at [facility]   View in ItalianView in Macedonian PortugueseView in GermanView in JapaneseView in FrenchView in SpanishView video in [place]err[place]ian   What is COVID-19?   COVID-19 stands for "coronavirus disease 2019." It is caused by a virus called SARS-CoV-2. The virus first appeared in late 2019 and quickly spread around the world.  What are the symptoms of COVID-19?   Symptoms usually start 4 or 5 days after a person is infected with the virus. But in some people, it can take up to 2 weeks for symptoms to appear. Some people never show symptoms at all.  When symptoms do happen, they can include:  Fever  Cough  Trouble breathing  Feeling tired  Shaking chills  Muscle aches  Headache  Sore throat  Problems with sense of smell or taste  Some people have digestive problems like nausea or diarrhea. There have also been some reports of rashes or other skin symptoms. For example, some people with COVID-19 get reddish-purple spots on their fingers or toes. But it's not clear why or how often this happens.  For most people, symptoms will get better within a few weeks. But a small number of people get very sick and stop being able to breathe on their own. In severe cases, their organs stop working, which can lead to death.  Some people with COVID-19 continue to have some symptoms for weeks or months. This seems to be more likely in people who are sick enough to need to stay in the hospital. But this can also happen " "in people who did not get very sick. Doctors are still learning about the long-term effects of COVID-19.  While children can get COVID-19, they are less likely than adults to have severe symptoms. More information about COVID-19 and children is available separately. (See "Patient education: COVID-19 and children (The Basics)".)  Am I at risk for getting seriously ill?   It depends on your age and health. In some people, COVID-19 leads to serious problems like pneumonia, not getting enough oxygen, heart problems, or even death. This risk gets higher as people get older. It is also higher in people who have other health problems like serious heart disease, chronic kidney disease, type 2 diabetes, chronic obstructive pulmonary disease (COPD), sickle cell disease, or obesity. People who have a weak immune system for other reasons (for example, HIV infection or certain medicines), asthma, cystic fibrosis, type 1 diabetes, or high blood pressure might also be at higher risk for serious problems.  How is COVID-19 spread?   The virus that causes COVID-19 mainly spreads from person to person. This usually happens when an infected person coughs, sneezes, or talks near other people. The virus is passed through tiny particles from the infected person's lungs and airway. These particles can easily travel through the air to other people who are nearby. In some cases, like in indoor spaces where the same air keeps being blown around, virus in the particles might be able to spread to other people who are farther away.  The virus can be passed easily between people who live together. But it can also spread at gatherings where people are talking close together, shaking hands, hugging, sharing food, or even singing together. Eating at restaurants raises the risk of infection, since people tend to be close to each other and not covering their faces. Doctors also think it is possible to get infected if you touch a surface that has the " "virus on it and then touch your mouth, nose, or eyes. However, this is probably not very common.  A person can be infected, and spread the virus to others, even without having any symptoms.  Are there different variants of the virus that causes COVID-19?   Yes. Viruses constantly change or "mutate." When this happens, a new strain or "variant" can form. Most of the time, new variants do not change the way a virus works. But when a variant has changes in important parts of the virus, it can act differently.  Experts have discovered several new variants of the virus that causes COVID-19. Certain variants seem to spread more easily than the original virus. They might also make people sicker.  Experts are studying the different variants. This will help them better understand how far they have spread, whether they affect people differently, and how well different vaccines protect against them.  The more people who get vaccinated against COVID-19, the harder it will be for the virus to form new variants.  Is there a test for the virus that causes COVID-19?   Yes. If your doctor or nurse suspects you have COVID-19, they might take a swab from inside your nose or mouth for testing. In some cases, they might take a sample of your saliva. These tests can help your doctor figure out if you have COVID-19 or another illness.  There are 2 types of tests used to diagnose COVID-19:  Molecular tests - These look for the genetic material from the virus. They are also called "nucleic acid tests." You can get a molecular test at a doctor's office, clinic, or pharmacy. There are also places that make these tests available for lots of people, often at drive-through locations. Depending on the lab, it can take up to several days to get test results back.  Molecular tests are the best way to know if a person has COVID-19. That's because they can detect even very low levels of virus in the body.  Antigen tests - These look for proteins from " "the virus. They can give results faster than most molecular tests. You can do an antigen test at a doctor's office, clinic, pharmacy, or through some organizations that make testing available in other places. You can also do an antigen test at home.  Antigen tests are not as accurate as molecular tests. They are more likely to give "false negative" results. This is when the test comes back negative even though the person actually is infected. But antigen tests can still be useful in some situations, when results are needed quickly or a molecular test is not available. For example, if a person has early symptoms of COVID-19, an antigen test can be accurate enough to detect virus in their body. If a person gets an antigen test and the result is negative, a molecular test might be needed to confirm they do not have the virus in their body. This might be done if the person has symptoms or knows they were exposed the virus.  There is also a blood test that can show if a person has had COVID-19 in the past. This is called an "antibody" test. Antibody tests are generally not used on their own to diagnose COVID-19 or make decisions about care. But experts can use them to learn how many people in a certain area were infected without knowing it.  Can COVID-19 be prevented?   The best way to prevent COVID-19 is to get vaccinated. In the [place], the first vaccines became available in late 2020. People age 12 and older can get a vaccine.  If enough people get the vaccine, the virus will stop spreading so quickly. More information about COVID-19 vaccines, including what you can do after being vaccinated, is available separately. (See "Patient education: COVID-19 vaccines (The Basics)".)  Experts believe that vaccines will be one of the most important ways to control the COVID-19 pandemic. People who are fully vaccinated are at much lower risk of getting the virus.  If you are not yet vaccinated, there are other ways to " "help protect yourself and others:  Practice "social distancing." It's most important to avoid contact with people who are sick. But social distancing also means staying at least 6 feet (about 2 meters) from anyone outside your household. That's because the virus can spread easily through close contact, and it's not always possible to know who is infected.  Wear a face mask when you need to go be in public around other people. This is mostly so that if you are infected, even if you don't have any symptoms, you are less likely to spread the infection to other people. It might also help protect you from others who could be infected. Make sure your mask covers your mouth and nose.  You can buy cloth masks and disposable (non-medical) masks in stores or online. Cloth masks work best if they have several layers of fabric. Your mask should fit snugly over your face with no gaps. You can improve the fit by using a mask with an adjustable nose wire, adjusting or knotting the ear loops to make it tighter, or wearing a cloth mask on top of a disposable mask.  When you take your mask off, make sure you do not touch your eyes, nose, or mouth. And wash your hands after you touch the mask. You can wash cloth masks with the rest of your laundry.  When you are outdoors and not around other people, you might not need to wear a mask. But it's important to know what the rules are in your area. The [place] Centers for Disease Control and Prevention (CDC) has more information about how to wear a face mask: www.cdc.gov/coronavirus/2019-ncov/prevent-getting-sick/about-face-coverings.html.  Wash your hands with soap and water often. This is especially important after being out in public or touching surfaces that many other people also touch, like door handles or railings. The risk of getting infected by touching items like this is probably not very high. Still, it's a good idea to wash your hands often. This also helps protect you " "from other illnesses, like the flu or the common cold.  Make sure to rub your hands with soap for at least 20 seconds, cleaning your wrists, fingernails, and in between your fingers. Then rinse your hands and dry them with a paper towel you can throw away. If you are not near a sink, you can use a hand sanitizing gel to clean your hands. The gels with at least 60 percent alcohol work the best. But it is better to wash with soap and water if you can.  Avoid touching your face, especially your mouth, nose, and eyes.  Avoid or limit traveling if you can. Any form of travel, especially if you spend time in crowded places like airports, increases your risk of getting and spreading infection.  If you do need to travel, be sure to check whether there are any rules about COVID-19 in the area you are visiting. In the [place], some places require people to "self-quarantine" for some length of time if they are visiting (or returning) from another state. This means not going out in public or being around other people. The [place] also requires a negative COVID-19 test for anyone who enters, or returns to, the country. Many other countries have testing requirements for visiting, too. All of these rules are meant to help slow the spread of COVID-19.  Once you are fully vaccinated, you are much less likely to get the virus. "Fully vaccinated" means you have had all doses of the vaccine and it has been at least 2 weeks since the last dose. (If you had a single-dose vaccine, you are fully vaccinated 2 weeks after you get the shot.)  What should I do if I have symptoms?   If you have a fever, cough, trouble breathing, or other symptoms of COVID-19, call your doctor or nurse. They will ask about your symptoms. They might also ask about any recent travel and whether you have been around anyone who might have been infected. Then they can tell you if you should come in or go somewhere else to be tested.  If your symptoms are " not severe, it is best to call before you go in. The staff can tell you what to do and whether you need to be seen in person. Many people with only mild symptoms should stay home and avoid other people until they get better. If you do need to go to the clinic or hospital, be sure to wear a mask. This helps protect other people. The staff might also have you wait someplace away from other people.  If you are severely ill and need to go to the clinic or hospital right away, you should still call ahead if possible. This way the staff can care for you while taking steps to protect others. If you think you are having a medical emergency, call for an ambulance (in the [place] and [place], dial [id number]).  What if I feel fine but think I was exposed?   If you think you were in close contact with someone with COVID-19, what to do next depends on whether you have already had COVID-19 or gotten the vaccine:  If you have not had COVID-19 or gotten the vaccine - You should get tested after a possible exposure, even if you don't have any symptoms. Call your doctor or nurse if you aren't sure where to get a test. Then self-quarantine at home and monitor yourself for symptoms. This means staying home as much as possible, and staying at least 6 feet (2 meters) away from other people in your home.  The safest thing to do after a possible exposure is to self-quarantine for 14 days. This can be challenging with work, school, or other responsibilities. Because of this, some public health departments might allow people to stop quarantining sooner, especially if they get a negative test. If you're not sure how long to quarantine for, contact your local public health office or ask your doctor or nurse.  If you have had COVID-19 or gotten the vaccine - If you had COVID-19 within the last 3 months, you do not need to self-quarantine. If you had COVID-19 but it was more than 3 months ago, follow the steps above.  If you are fully vaccinated, you do  "not need to self-quarantine. But you should still get tested 3 to 5 days after you were in contact with the person who had COVID-19. Even though you are much less likely to get the infection after being vaccinated, it is still possible.  If you self-quarantine for less than 14 days, or if you do not need to self-quarantine, you should still monitor yourself for symptoms for the full 14 days. If you start to have any symptoms, call your doctor or nurse right away. You should also be extra careful about wearing a mask and social distancing during this time.  How is COVID-19 treated?   Many people will be able to stay home while they get better. But people with serious symptoms or other health problems might need to go to the hospital.  Mild illness - Mild illness means you might have symptoms like fever and cough, but you do not have trouble breathing. Most people with COVID-19 have mild illness and can rest at home until they get better. This usually takes about 2 weeks, but it's not the same for everyone.  If you are recovering from COVID-19, it's important to stay home and "self-isolate" until your doctor or nurse tells you it's safe to stop. Self-isolation means staying apart from other people, even the people you live with. When you can stop self-isolation will depend on how long it has been since you had symptoms, and in some cases, whether you have had a negative test (showing that the virus is no longer in your body).  Severe illness - If you have more severe illness with trouble breathing, you might need to stay in the hospital, possibly in the intensive care unit (also called the "ICU"). While you are there, you will most likely be in a special isolation room. Only medical staff will be allowed in the room, and they will have to wear special gowns, gloves, masks, and eye protection.  The doctors and nurses can monitor and support your breathing and other body functions and make you as comfortable as " "possible. You might need extra oxygen to help you breathe easily. If you are having a very hard time breathing, you might need a breathing tube. The tube goes down your throat and into your lungs. It is connected to a machine to help you breathe, called a "ventilator."  Doctors are studying several possible treatments for COVID-19. In certain cases, they might recommend treatments called "monoclonal antibodies." These treatments seem to help some people who are at risk of getting severely ill.  Doctors also might recommend being part of a clinical trial. A clinical trial is a scientific study that tests new medicines to see how well they work. Do not try any new medicines or treatments without talking to a doctor.  What should I do if someone in my home has COVID-19?   If someone in your home has COVID-19, there are additional things you can do to protect yourself and others:  Keep the sick person away from others - The sick person should stay in a separate room, and use a different bathroom if possible. They should also eat in their own room.  Experts also recommend that the person stay away from pets in the house until they are better.  Have them wear a mask - The sick person should wear a mask when they are in the same room as other people. If they can't wear a mask, you can help protect yourself by covering your face when you are in the room with them.  Wash hands - Wash your hands with soap and water often.  Clean often - Here are some specific things that can help:  Wear disposable gloves when you clean. It's also a good idea to wear gloves when you have to touch the sick person's laundry, dishes, utensils, or trash. Wash your hands after removing your gloves.  Regularly clean things that are touched a lot. This includes counters, bedside tables, doorknobs, computers, phones, and bathroom surfaces.  Clean things in your home with soap and water, but also use disinfectants on appropriate surfaces. Some cleaning " "products work well to kill bacteria, but not viruses, so it's important to check labels. The [place] Environmental Protection Agency (EPA) has a list of products here: www.epa.gov/pesticide-registration/list-n-disinfectants-use-against-sars-cov-2.  What if I am pregnant?   More information about COVID-19 and pregnancy is available separately. (See "Patient education: COVID-19 and pregnancy (The Basics)".)  If you are pregnant and you have questions about COVID-19, talk to your doctor, nurse, or midwife. They can help.  What can I do to cope with stress and anxiety?   It's normal to feel anxious or worried about COVID-19. It's also normal to feel stressed, lonely, or tired of not being able to do your usual activities. You can take care of yourself by trying to:  Take breaks from the news  Get regular exercise and eat healthy foods  Find activities that you enjoy and can do at home  Stay in touch with your friends and family members  It might help to remember that by doing things like getting vaccinated and following local guidelines, you are helping to protect other people in your community.  Where can I go to learn more?   As we learn more about this virus, expert recommendations will continue to change. Check with your doctor or public health official to get the most updated information about how to protect yourself and others.  For information about COVID-19 in your area, you can call your local public health office. In the [place], this usually means your city or town's Board of Health. Many states also have a "hotline" phone number you can call.  You can find more information about COVID-19 at the following websites:  [place] Centers for Disease Control and Prevention (CDC): www.cdc.gov/COVID19  World Health Organization (WHO): www.who.int/emergencies/diseases/novel-coronavirus-2019  All topics are updated as new evidence becomes available and our peer review process is complete.  This topic " retrieved from [employer] on: Oct 28, 2021.  Topic 243328 Version 67.0  Release: 29.4.2 - C29.263  © 2021 [employer], Inc[employer] and/or its affiliates. All rights reserved.  Consumer Information Use and Disclaimer   This information is not specific medical advice and does not replace information you receive from your health care provider. This is only a brief summary of general information. It does NOT include all information about conditions, illnesses, injuries, tests, procedures, treatments, therapies, discharge instructions or life-style choices that may apply to you. You must talk with your health care provider for complete information about your health and treatment options. This information should not be used to decide whether or not to accept your health care provider's advice, instructions or recommendations. Only your health care provider has the knowledge and training to provide advice that is right for you. The use of this information is governed by the [employer] End User License Agreement, available at https://www.[employer]/en/solutions/iCharts/about/attila.The use of [employer] content is governed by the [employer] Terms of Use. ©2021 [employer] Inc. All rights reserved.  Copyright   © 2021 [employer], Inc. and/or its affiliates. All rights reserved.  Patient Education       COVID-19 Discharge Instructions   About this topic   Coronavirus disease 2019 is also known as COVID-19. It is a viral illness that infects the lungs. It is caused by a virus called SARS-associated coronavirus (SARS-CoV-2).  The signs of COVID-19 most often start a few days after you have been infected. In some people, it takes longer to show signs. Others never show signs of the infection. You may have a cough, fever, shaking chills and it may be hard to breathe. You may be very tired, have muscle aches, a headache or sore throat. Some people have an upset stomach or loose stools. Others lose their sense of smell or taste. You may not have  these signs all the time and they may come and go while you are sick.  The virus spreads easily through droplets when you talk, sneeze, or cough. You can pass the virus to others when you are talking close together, singing, hugging, sharing food, or shaking hands. Doctors believe the germs also survive on surfaces like tables, door handles, and telephones. However, this is not a common way that COVID-19 spreads. Doctors believe you can also spread the infection even if you dont have any symptoms, but they do not know how that happens. This is why getting vaccinated is one of the best ways to keep you healthy and slow the spread of the virus.  Some people have a mild case of COVID-19 and are able to stay at home and away from others until they feel better. Others may need to be in the hospital if they are very sick. Some people with COVID-19 can have some symptoms for weeks or months. People with COVID-19 must isolate themselves. You can start to be around others when your doctor says it is safe to do so.       What care is needed at home?   Ask your doctor what you need to do when you go home. Make sure you ask questions if you do not understand what the doctor says.  Drink lots of water, juice, or broth to replace fluids lost from a fever.  You may use cool mist humidifiers to help ease congestion and coughing.  Use 2 to 3 pillows to prop yourself up when you lie down to make it easier to breathe and sleep.  Do not smoke and do not drink beer, wine, and mixed drinks (alcohol).  To lower the chance of passing the infection to others, get a COVID-19 vaccine after your infection has resolved.  If you have not been fully vaccinated:  Wear a mask over your mouth and nose if you are around others who are not sick. Cloth masks work best if they have more than one layer of fabric.  Wash your hands often.  Stay home in a separate room, if possible, away from others. Only go out to get medical care.  Use a separate  bathroom if possible.  Do not make food for others.  What follow-up care is needed?   Your doctor may ask you to make visits to the office to check on your progress. Be sure to keep these visits. Make sure you wear a mask at these visits.  If you can, tell the staff you have COVID-19 ahead of time so they can take extra care to stop the disease from spreading.  It may take a few weeks before your health returns to normal.  What drugs may be needed?   The doctor may order drugs to:  Help with breathing  Help with fever  Help with swelling in your airways and lungs  Control coughing  Ease a sore throat  Help a runny or stuffy nose  Will physical activity be limited?   You may have to limit your physical activity. Talk to your doctor about the right amount of activity for you. If you have been very sick with COVID-19, it can take some time to get your strength back.  Will there be any other care needed?   Doctors do not know how long you can pass the virus on to others after you are sick. This is why it is important to stay in a separate room, if possible, when you are sick. For now, doctors are giving general guidelines for you to follow after you have been sick. Before you go around other people, you should:  Be fever free for 24 hours without taking any drugs to lower the fever  Have no symptoms of cough or shortness of breath  Wait at least 10 days after first having symptoms or your first positive test, and you need to be symptom free as above. Some experts suggest waiting 20 days if you have had a more severe infection.  Talk with your doctor about getting a COVID-19 vaccine.  What problems could happen?   Fluid loss. This is dehydration.  Short-term or long-term lung damage  Heart problems  Death  When do I need to call the doctor?   You are having so much trouble breathing that you can only say one or two words at a time.  You need to sit upright at all times to be able to breathe and/or cannot lie down.  You  are very confused or cannot stay awake.  Your lips or skin start to turn blue or grey.  You think you might be having a medical emergency. Some examples of medical emergencies are:  Severe chest pain.  Not able to speak or move normally.  You have trouble breathing when talking or sitting still.  You have new shortness of breath.  You become weak or dizzy.  You have very dark urine or do not pass urine for more than 8 hours.  You have new or worsening COVID-19 symptoms like:  Fever  Cough  Feeling very tired  Shaking chills  Headache  Trouble swallowing  Throwing up  Loose stools  Reddish purple spots on your fingers or toes  Teach Back: Helping You Understand   The Teach Back Method helps you understand the information we are giving you. After you talk with the staff, tell them in your own words what you learned. This helps to make sure the staff has described each thing clearly. It also helps to explain things that may have been confusing. Before going home, make sure you can do these:  I can tell you about my condition.  I can tell you what may help ease my breathing.  I can tell you what I can do to help avoid passing the infection to others.  I can tell you what I will do if I have trouble breathing; feel sleepy or confused; or my fingertips, fingernails, skin, or lips are blue.  Where can I learn more?   Centers for Disease Control and Prevention  https://www.cdc.gov/coronavirus/2019-ncov/about/index.html   Centers for Disease Control and Prevention  https://www.cdc.gov/coronavirus/2019-ncov/hcp/disposition-in-home-patients.html   World Health Organization  https://www.who.int/news-room/q-a-detail/s-z-krglflireelmc   Last Reviewed Date   2021-10-05  Consumer Information Use and Disclaimer   This information is not specific medical advice and does not replace information you receive from your health care provider. This is only a brief summary of general information. It does NOT include all information about  conditions, illnesses, injuries, tests, procedures, treatments, therapies, discharge instructions or life-style choices that may apply to you. You must talk with your health care provider for complete information about your health and treatment options. This information should not be used to decide whether or not to accept your health care providers advice, instructions or recommendations. Only your health care provider has the knowledge and training to provide advice that is right for you.  Copyright   Copyright © 2021 [employer] and its affiliates and/or licensors. All rights reserved.

## 2022-12-12 NOTE — PROGRESS NOTES
HPI     Chief Complaint:  Chief Complaint   Patient presents with    Cough       Srinivasan Munoz is a 62 y.o. male with multiple medical diagnoses as listed in the medical history and problem list that presents for cough.  Pt is new to me but is known to this clinic with his last appointment being 2022.      Cough  This is a new problem. The current episode started 1 to 4 weeks ago. The problem has been unchanged. The problem occurs every few hours. The cough is Productive of sputum. Associated symptoms include chills and postnasal drip. Pertinent negatives include no chest pain, fever, sore throat, shortness of breath or wheezing. The symptoms are aggravated by lying down. Treatments tried: albuterol, Delsym, zyrtec. The treatment provided moderate relief.     Denies chest tightness or wheezing. Denies sick contacts. He is a current every day smoker (1PPD x 40 yrs). He is allergic to penicillin.    he is compliant with medications daily without any adverse side effects.    Assessment & Plan     Problem List Items Addressed This Visit          Endocrine    BMI 35.0-35.9,adult    Body mass index is 35.63 kg/m².    We discussed weight issues and safe, effective ways of losing pounds, includin) diet:  low carbohydrate, low fat diet, stay away from fast food, fried and processed food, use whole grain, lot of fruits and vegetables, use healthy fat such as avocado, nuts and olive oil in reasonable quantity, stay away from sodas. Regular meals with lean proteins.  2) physical activity: ideally 150 min a week, with cardiovascular and resistance activity.  Patient was encouraged to set realistic attainable goals for weight loss, and we will follow up periodically.      Overview     The patient is asked to make an attempt to improve diet and exercise patterns to aid in medical management of this problem.  Recommend to stop peanut butter and chocolate   Recommend to eat portion controlled foods   Avoid  processed carbohydrates  Eat whole foods   Increase sleep   Diet and exercise planning discussed.              Orthopedic    Chronic left shoulder pain    The current medical regimen is effective;  continue present plan      Cervical pain    The current medical regimen is effective;  continue present plan       Other Visit Diagnoses       Cough in adult    -  Primary    -AFVSS in clinic today. Lung sounds clear bilaterally.   -Mild symptoms, most likely viral etiology.  -based on clinical findings today, does not warrant Abx treatment at this time. D/w pt about the potential risks of inappropriate Abx use and that if patient's Sx worsens, we will re-evaluate to assess the need to change the treatment plan.    -Warm tea with honey and lemon, and/or warm salt water gargles every 4 hours PRN for sore throat. May try OTC Cepacol if pt desires.  -advised frequent hand washing, rest, and plenty of fluids. Increase water intake to 64-80 oz daily to help thin mucus.  -Tylenol tablets as needed for fever, headaches, sore throat, ear pain, bodyaches, and/or nasal/sinus inflammation.   -Nasal Saline spray (Over the counter [place] spray or [place])  2 sprays each nostril 2-3 times a day for nasal congestion.     POCT flu negative    COVID19 pending    Tessalon prn.     Smoking cessation.    Mucinex prn    I counseled the patient on fluids, rest, OTC medications that can safely be used, hand/cough hygiene, expected course of illness, and when further medical attention would be warranted.      Discussed DDx, condition, and treatment.   Education sent to patient portal/included in after visit summary.  ED precautions given.   Notify provider if symptoms do not resolve or increase in severity.   Patient verbalizes understanding and agrees with plan of care.        Relevant Medications    benzonatate (TESSALON) 200 MG capsule    Other Relevant Orders    POCT Influenza A/B Molecular (Completed)    Tobacco abuse        -Counseled patient  to quit tobacco usage, and advised on several options to quit and the benefits of quitting, which include but are not limited to: decreasing the risk of cancer, HTN, CVD, respiratory complications, among other diseases.  -5 minutes spent discussing cessation.   -pt is interested in quitting.     Smoking cessation    Start lozenges.    Relevant Medications    nicotine polacrilex 4 MG Lozg    Other Relevant Orders    Ambulatory referral/consult to Smoking Cessation Program    CT Chest Lung Screening Low Dose    Snoring        Concern for possible sleep apnea refer to sleep med for eval    Relevant Orders    Ambulatory referral/consult to Sleep Disorders    Awakens from sleep at night        As above    Relevant Orders    Ambulatory referral/consult to Sleep Disorders    Sinus congestion        Mucinex prn    Relevant Medications    guaiFENesin (MUCINEX) 600 mg 12 hr tablet            --------------------------------------------      Health Maintenance:  Health Maintenance         Date Due Completion Date    Pneumococcal Vaccines (Age 0-64) (1 - PCV) Never done ---    HIV Screening Never done ---    Colorectal Cancer Screening Never done ---    LDCT Lung Screen Never done ---    COVID-19 Vaccine (4 - Booster for Moderna series) 04/22/2022 2/25/2022    Influenza Vaccine (1) 09/01/2022 10/10/2019    TETANUS VACCINE 10/24/2027 10/24/2017    Lipid Panel 11/07/2027 11/7/2022            Health maintenance reviewed.  LDCT, Vaccines offered patient declined at this time.    Follow Up:  Follow up in about 2 weeks (around 12/26/2022), or if symptoms worsen or fail to improve.    Exam     Review of Systems:  (as noted above)  Review of Systems   Constitutional:  Positive for chills. Negative for fever.   HENT:  Positive for congestion, postnasal drip, sinus pressure and sneezing. Negative for sore throat and trouble swallowing.    Eyes:  Negative for visual disturbance.   Respiratory:  Positive for cough. Negative for chest  tightness, shortness of breath and wheezing.    Cardiovascular:  Negative for chest pain.   Gastrointestinal:  Negative for blood in stool.     Physical Exam:   Physical Exam  Constitutional:       General: He is not in acute distress.     Appearance: He is obese. He is not ill-appearing or diaphoretic.   HENT:      Head: Normocephalic and atraumatic.   Eyes:      General: No scleral icterus.  Cardiovascular:      Rate and Rhythm: Normal rate and regular rhythm.      Pulses: Normal pulses.      Heart sounds: No murmur heard.    No friction rub. No gallop.   Pulmonary:      Effort: No respiratory distress.   Chest:      Chest wall: No tenderness.   Musculoskeletal:      Cervical back: No rigidity.   Skin:     Capillary Refill: Capillary refill takes 2 to 3 seconds.   Neurological:      General: No focal deficit present.      Mental Status: He is alert and oriented to person, place, and time.     Vitals:    12/12/22 0856   BP: 120/64   BP Location: Left arm   Patient Position: Sitting   BP Method: Large (Manual)   Pulse: 61   Temp: 98 °F (36.7 °C)   TempSrc: Oral   SpO2: 95%   Weight: 122.5 kg (270 lb 1 oz)      Body mass index is 35.63 kg/m².        History     Past Medical History:  Past Medical History:   Diagnosis Date    Arthritis     Eye injury 20 yrs ago    nail stuck in od     Fractured bone     right foot    Nuclear sclerosis of left eye 6/25/2018    Retinal detachment 20 yrs ago     od     Squamous cell carcinoma of skin        Past Surgical History:  Past Surgical History:   Procedure Laterality Date    CATARACT EXTRACTION Left 06/25/2018    Dr. Pa    CATARACT EXTRACTION W/  INTRAOCULAR LENS IMPLANT Right 1999    Dr. Landry    EPIDURAL STEROID INJECTION Left 5/1/2019    Procedure: Axillary, Suprascapular, and Lateral Pectoral Nerve Blocks;  Surgeon: Dameon Austin Jr., MD;  Location: [facility];  Service: Pain Management;  Laterality: Left;  Left Axillary, Suprascapular and Lateral Pectoral Nerve Blocks  under Fluoroscopy    04733    Arrive @ 0900; NO Sedation; Confirm with Edmundo; (No DM or anticoag)    EPIDURAL STEROID INJECTION Left 5/16/2019    Procedure: Nerve Radiofrequency Thermocoagulation;  Surgeon: Dameon Austin Jr., MD;  Location: [facility] ENDO;  Service: Pain Management;  Laterality: Left;  Left Axillary, Suprascapular and Lateral Pectoral Nerve Radiofrequency Ablations    08593    Arrive @ 1100; IV Sedation- Fasting; No Anticoags or DM    EPIDURAL STEROID INJECTION N/A 10/2/2019    Procedure: Injection, Steroid, Epidural Cervical;  Surgeon: Dameon Austin Jr., MD;  Location: [facility] ENDO;  Service: Pain Management;  Laterality: N/A;  C7-T1 JHOAN    94339    Arrive @ 1130; No ATC or DM    EYE SURGERY      INTRAOCULAR PROSTHESES INSERTION Left 6/25/2018    Procedure: INSERTION-INTRAOCULAR LENS (IOL);  Surgeon: Funmilayo Pa MD;  Location: [facility];  Service: Ophthalmology;  Laterality: Left;    LASIK Bilateral 2000    PHACOEMULSIFICATION OF CATARACT Left 6/25/2018    Procedure: PHACOEMULSIFICATION-ASPIRATION-CATARACT;  Surgeon: Funmilayo Pa MD;  Location: [facility];  Service: Ophthalmology;  Laterality: Left;    RETINAL DETACHMENT SURGERY Right 1999    Dr. Landry       Social History:  Social History     Socioeconomic History    Marital status:    Tobacco Use    Smoking status: Every Day     Packs/day: 1.00     Years: 42.00     Pack years: 42.00     Types: Cigarettes    Smokeless tobacco: Never   Substance and Sexual Activity    Alcohol use: No    Drug use: No    Sexual activity: Yes     Partners: Female       Family History:  Family History   Problem Relation Age of Onset    Heart disease Father     Hypertension Father     Diabetes Mother     Stroke Mother     Cancer Mother     Hypertension Mother     Stroke Brother     Diabetes Brother     No Known Problems Sister     No Known Problems Maternal Aunt     No Known Problems Maternal Uncle     No Known Problems Paternal Aunt     No Known Problems  Paternal Uncle     No Known Problems Maternal Grandmother     No Known Problems Maternal Grandfather     No Known Problems Paternal Grandmother     No Known Problems Paternal Grandfather     Amblyopia Neg Hx     Blindness Neg Hx     Cataracts Neg Hx     Glaucoma Neg Hx     Macular degeneration Neg Hx     Retinal detachment Neg Hx     Strabismus Neg Hx     Thyroid disease Neg Hx        Allergies and Medications: (updated and reviewed)  Review of patient's allergies indicates:   Allergen Reactions    Penicillins Hives     Current Outpatient Medications   Medication Sig Dispense Refill    albuterol (PROVENTIL/VENTOLIN HFA) 90 mcg/actuation inhaler Inhale 2 puffs into the lungs every 6 (six) hours as needed for Wheezing or Shortness of Breath. 18 g 0    celecoxib (CELEBREX) 200 MG capsule Take 1 capsule (200 mg total) by mouth 2 (two) times daily. 180 capsule 1    gabapentin (NEURONTIN) 800 MG tablet Take 1 tablet (800 mg total) by mouth 3 (three) times daily. 270 tablet 1    HYDROcodone-acetaminophen (NORCO) 5-325 mg per tablet Take 1 tablet by mouth every 12 (twelve) hours as needed for Pain. 60 tablet 0    [START ON 12/13/2022] HYDROcodone-acetaminophen (NORCO) 5-325 mg per tablet Take 1 tablet by mouth every 12 (twelve) hours as needed for Pain. 60 tablet 0    [START ON 1/11/2023] HYDROcodone-acetaminophen (NORCO) 5-325 mg per tablet Take 1 tablet by mouth every 12 (twelve) hours as needed for Pain. 60 tablet 0    [START ON 2/9/2023] HYDROcodone-acetaminophen (NORCO) 5-325 mg per tablet Take 1 tablet by mouth every 12 (twelve) hours as needed for Pain. 60 tablet 0    benzonatate (TESSALON) 200 MG capsule Take 1 capsule (200 mg total) by mouth 3 (three) times daily as needed for Cough. 15 capsule 0    guaiFENesin (MUCINEX) 600 mg 12 hr tablet Take 1 tablet (600 mg total) by mouth 2 (two) times daily as needed for Congestion. 14 tablet 0    nicotine polacrilex 4 MG Lozg Take 1 lozenge (4 mg total) by mouth every 2  (two) hours as needed (smoking cessation). 1 lozenge PO every 3-4 hours as needed for nicotine cravings 24 lozenge 3     No current facility-administered medications for this visit.       Patient Care Team:  Yolanda Marques MD as PCP - General (Family Medicine)  Carlita Watts LPN as Licensed Practical Nurse      The patient expressed understanding and no barriers to adherence were identified.      - The patient indicates understanding of these issues and agrees with the plan. Brief care plan is updated and reviewed with the patient as applicable.      - The patient is given an After Visit Summary that lists all medications with directions, allergies, education, orders placed during this encounter and follow-up instructions.      - I have reviewed the patient's medical information including past medical, family, and social history sections including the medications and allergies.      - We discussed the patient's current medications.     This note was created by combination of typed  and MModal dictation.  Transcription errors may be present.  If there are any questions, please contact me.       Triston Sandoval NP

## 2022-12-13 ENCOUNTER — TELEPHONE (OUTPATIENT)
Dept: FAMILY MEDICINE | Facility: CLINIC | Age: 62
End: 2022-12-13
Payer: COMMERCIAL

## 2022-12-13 DIAGNOSIS — J06.9 UPPER RESPIRATORY TRACT INFECTION, UNSPECIFIED TYPE: Primary | ICD-10-CM

## 2022-12-13 NOTE — TELEPHONE ENCOUNTER
Patient states he was told by provider if  flu and covid was negative he could get antibiotics, requesting medication as his testing  results are negative

## 2022-12-13 NOTE — TELEPHONE ENCOUNTER
----- Message from Mickie Santana sent at 12/13/2022 10:59 AM CST -----  Regarding: Self/   [phone]  Type: Patient Call Back    Who called:  Spouse    What is the request in detail:  Patient would like to know if a Z Mayito can be called in if something were to be called in.  Thank you    Would the patient rather a call back or a response via [facility]?  Call back    Best call back number:   [phone]          Thank you

## 2022-12-14 ENCOUNTER — TELEPHONE (OUTPATIENT)
Dept: FAMILY MEDICINE | Facility: CLINIC | Age: 62
End: 2022-12-14
Payer: COMMERCIAL

## 2022-12-14 RX ORDER — AZITHROMYCIN 250 MG/1
TABLET, FILM COATED ORAL
Qty: 6 TABLET | Refills: 0 | Status: SHIPPED | OUTPATIENT
Start: 2022-12-14 | End: 2022-12-19

## 2022-12-14 NOTE — TELEPHONE ENCOUNTER
Called pt and discussed lab results, all questions answered.     Problem List Items Addressed This Visit    None  Visit Diagnoses       Upper respiratory tract infection, unspecified type    -  Primary    Relevant Medications    azithromycin (Z-EMILIANO) 250 MG tablet

## 2022-12-14 NOTE — TELEPHONE ENCOUNTER
----- Message from Triston Sandoval NP sent at 12/14/2022  7:48 AM CST -----  Good day,    Please call and inform the patient of their negative test result. Thank you for completing this task for me and all that you do for our team.     I hope you the rest of your day is pleasant.   ----- Message -----  From: James Buchanan MA  Sent: 12/12/2022   9:50 AM CST  To: Triston Sandoval NP

## 2023-01-03 ENCOUNTER — PATIENT MESSAGE (OUTPATIENT)
Dept: ADMINISTRATIVE | Facility: HOSPITAL | Age: 63
End: 2023-01-03
Payer: COMMERCIAL

## 2023-01-19 ENCOUNTER — TELEPHONE (OUTPATIENT)
Dept: FAMILY MEDICINE | Facility: CLINIC | Age: 63
End: 2023-01-19
Payer: COMMERCIAL

## 2023-01-19 NOTE — TELEPHONE ENCOUNTER
----- Message from Zaynab Vallejo sent at 1/19/2023 10:31 AM CST -----  Type: RX Refill Request    Who Called: pt     Have you contacted your pharmacy:    Refill or New Rx:HYDROcodone-acetaminophen (NORCO) 5-325 mg per tablet - needs pa    RX Name and Strength:    How is the patient currently taking it? (ex. 1XDay):    Is this a 30 day or 90 day RX:    Preferred Pharmacy with phone number:  [address] [facility] - [address] [facility]  [address] [place] [address]  Phone: [phone] Fax: [phone]        Local or Mail Order:    Ordering Provider:    Would the patient rather a call back or a response via [facility]? call    Best Call Back Number:[phone] (home)       Additional Information:

## 2023-01-19 NOTE — TELEPHONE ENCOUNTER
----- Message from Zaynab Vallejo sent at 1/19/2023 10:31 AM CST -----  Type: RX Refill Request    Who Called: pt     Have you contacted your pharmacy:    Refill or New Rx:HYDROcodone-acetaminophen (NORCO) 5-325 mg per tablet - needs pa    RX Name and Strength:    How is the patient currently taking it? (ex. 1XDay):    Is this a 30 day or 90 day RX:    Preferred Pharmacy with phone number:  [address] [facility] - [address] [facility]  [address] [facility] [place] [address]  Phone: [phone] Fax: [phone]        Local or Mail Order:    Ordering Provider:    Would the patient rather a call back or a response via [facility]? call    Best Call Back Number:[phone] (home)       Additional Information:

## 2023-01-26 ENCOUNTER — PATIENT MESSAGE (OUTPATIENT)
Dept: ADMINISTRATIVE | Facility: CLINIC | Age: 63
End: 2023-01-26
Payer: COMMERCIAL

## 2023-01-26 ENCOUNTER — PATIENT MESSAGE (OUTPATIENT)
Dept: FAMILY MEDICINE | Facility: CLINIC | Age: 63
End: 2023-01-26

## 2023-01-26 ENCOUNTER — OFFICE VISIT (OUTPATIENT)
Dept: FAMILY MEDICINE | Facility: CLINIC | Age: 63
End: 2023-01-26
Payer: COMMERCIAL

## 2023-01-26 ENCOUNTER — TELEPHONE (OUTPATIENT)
Dept: FAMILY MEDICINE | Facility: CLINIC | Age: 63
End: 2023-01-26
Payer: COMMERCIAL

## 2023-01-26 DIAGNOSIS — Z72.0 TOBACCO ABUSE: ICD-10-CM

## 2023-01-26 DIAGNOSIS — R05.9 COUGH IN ADULT: ICD-10-CM

## 2023-01-26 DIAGNOSIS — U07.1 COVID-19: Primary | ICD-10-CM

## 2023-01-26 DIAGNOSIS — M47.22 OSTEOARTHRITIS OF SPINE WITH RADICULOPATHY, CERVICAL REGION: ICD-10-CM

## 2023-01-26 PROCEDURE — 99214 OFFICE O/P EST MOD 30 MIN: CPT | Mod: 95,,, | Performed by: NURSE PRACTITIONER

## 2023-01-26 PROCEDURE — 99214 PR OFFICE/OUTPT VISIT, EST, LEVL IV, 30-39 MIN: ICD-10-PCS | Mod: 95,,, | Performed by: NURSE PRACTITIONER

## 2023-01-26 RX ORDER — ASPIRIN/CALCIUM CARB/MAGNESIUM 325 MG
4 TABLET ORAL
Qty: 24 LOZENGE | Refills: 3 | COMMUNITY
Start: 2023-01-26 | End: 2023-01-27 | Stop reason: SDUPTHER

## 2023-01-26 RX ORDER — MENTHOL 4.8 MG
1 LOZENGE MUCOUS MEMBRANE
Qty: 21 LOZENGE | Refills: 3 | Status: SHIPPED | OUTPATIENT
Start: 2023-01-26 | End: 2023-01-27 | Stop reason: SDUPTHER

## 2023-01-26 NOTE — TELEPHONE ENCOUNTER
Spoke with pt's wife; pt has virtual visit with SKYLA Sandoval later today; pt wondering if there was something he could take for symptoms  runny nose, coughing (extremely hard), headache, congestion, mucus yellowish green 2 days; covid positive

## 2023-01-26 NOTE — PROGRESS NOTES
The patient location is:  Patient Home  The chief complaint leading to consultation is: as below  Visit type: Virtual visit with synchronous audio and video  Total time spent with patient: 15 minutes  Each patient to whom he or she provides medical services by telemedicine is:  (1) informed of the relationship between the physician and patient and the respective role of any other health care provider with respect to management of the patient; and (2) notified that he may decline to receive medical services by telemedicine and may withdraw from such care at any time.      HPI     Chief Complaint:  COVID-19      Srinivasan Munoz is a 62 y.o. male with multiple medical diagnoses as listed in the medical history and problem list that presents for COVID-19.  Pt is new to me but is known to this clinic with his last appointment being 12/12/2022.      Cough  This is a new problem. The current episode started yesterday. The problem has been rapidly worsening. The problem occurs every few minutes. The cough is Productive of purulent sputum. Associated symptoms include chest pain (when coughing), ear congestion, headaches, myalgias, nasal congestion, postnasal drip, rhinorrhea, a sore throat, shortness of breath (with exertion) and wheezing. Pertinent negatives include no chills, ear pain, fever, heartburn, hemoptysis, rash, sweats or weight loss. The symptoms are aggravated by lying down. Risk factors for lung disease include smoking/tobacco exposure. He has tried OTC cough suppressant, a beta-agonist inhaler, body position changes, rest and steroid inhaler for the symptoms. The treatment provided no relief. His past medical history is significant for COPD. There is no history of asthma, bronchiectasis, bronchitis, emphysema, environmental allergies or pneumonia.     He has been using his inhaler and flonase.     Pt reports + COVID-19 test on 1/25/23.    He is vaccinated against COVID-19.     he is compliant with  medications daily without any adverse side effects.    Assessment & Plan     Problem List Items Addressed This Visit          Neuro    Osteoarthritis of spine with radiculopathy, cervical region    Denies recent changes.    The current medical regimen is effective;  continue present plan         Endocrine    BMI 35.0-35.9,adult    We discussed weight issues and safe, effective ways of losing pounds, includin) diet:  low carbohydrate, low fat diet, stay away from fast food, fried and processed food, use whole grain, lot of fruits and vegetables, use healthy fat such as avocado, nuts and olive oil in reasonable quantity, stay away from sodas. Regular meals with lean proteins.  2) physical activity: ideally 150 min a week, with cardiovascular and resistance activity.  Patient was encouraged to set realistic attainable goals for weight loss, and we will follow up periodically.      Overview     The patient is asked to make an attempt to improve diet and exercise patterns to aid in medical management of this problem.  Recommend to stop peanut butter and chocolate   Recommend to eat portion controlled foods   Avoid processed carbohydrates  Eat whole foods   Increase sleep   Diet and exercise planning discussed.            Other Visit Diagnoses       COVID-19    -  Primary    Appears sickly. Reports productive cough. + COVID19 home and PCR test within the past 48 hrs. Symptom onset in the past 4 days.     Discussed pathophysiology, symptom/presentation, and management of COVID-19.    Continue with supportive care, Tylenol every 4-6 hours as needed for fever, headaches, sore throat, ear pain, bodyaches, and/or nasal/sinus inflammation    Discussed the importance of hydration and rest.     Enrolled in COVID19 home symptom monitoring.    Provided patient with material outlining COVID-19 and treatment plan    Discussed treatment options. Due to severity of pt's illness, they are a candidate for EUA Paxlovid. Discussed  medication, EUA status, AE, and directions for use. Pt verbalized understanding and wishes to proceed with treatment.     Start Paxlovid 1 dose PO bid x5 days (1-day pack contains nirmatrelvir 150 mg tab x4 and ritonavir 100 mg tab x2)    Recommend to stay inside and isolate yourself from family members, as well as washing your hands frequently and drinking plenty of fluids. Wear a mask if you absolutely need to go out and practice social distancing.  Before resuming your regular activities, you should be fever-free for at least 72 hours WITHOUT taking any fever reducting medications (i.e, Tylenol) and/or being at least 5 days out from the initial positive test. Wear a facemask when around others. Cover your coughs and sneezes. Wash your hands often with soap and water; hand  can be used, too. Avoid sharing personal household items. Wipe down surfaces used daily.    ED precautions given.   Notify provider if symptoms do not resolve or increase in severity.   Patient verbalizes understanding and agrees with plan of care.      Relevant Medications    pulse oximeter (PULSE OXIMETER) device    nirmatrelvir-ritonavir 300 mg (150 mg x 2)-100 mg copackaged tablets (EUA)    Other Relevant Orders    COVID-19 Surveillance Program (Completed)    Tobacco abuse        He continues to smoke    Discussed importance of cessation and risks associated with smoking    Refilled nicotine lozenges.     Relevant Medications    nicotine polacrilex 4 MG Lozg    Cough in adult        Ricola prn    Relevant Medications    menthol (RICOLA) 4.8 mg Lozg            --------------------------------------------      Health Maintenance:  Health Maintenance         Date Due Completion Date    Pneumococcal Vaccines (Age 0-64) (1 - PCV) Never done ---    HIV Screening Never done ---    Colorectal Cancer Screening Never done ---    LDCT Lung Screen Never done ---    COVID-19 Vaccine (4 - Booster for Moderna series) 04/22/2022 2/25/2022     Influenza Vaccine (1) 09/01/2022 10/10/2019    Hemoglobin A1c (Diabetic Prevention Screening) 11/07/2025 11/7/2022    TETANUS VACCINE 10/24/2027 10/24/2017    Lipid Panel 11/07/2027 11/7/2022            Health maintenance reviewed.  Advised to obtain pneumonia and flu vaccine once symptoms resolve.     Follow Up:  Follow up in about 2 weeks (around 2/9/2023), or if symptoms worsen or fail to improve.    Exam     Review of Systems:  (as noted above)  Review of Systems   Constitutional:  Negative for chills, fever and weight loss.   HENT:  Positive for postnasal drip, rhinorrhea and sore throat. Negative for ear pain.    Respiratory:  Positive for cough, shortness of breath (with exertion) and wheezing. Negative for hemoptysis.    Cardiovascular:  Positive for chest pain (when coughing).   Gastrointestinal:  Negative for heartburn.   Musculoskeletal:  Positive for myalgias.   Skin:  Negative for rash.   Allergic/Immunologic: Negative for environmental allergies.   Neurological:  Positive for headaches.     Physical Exam:   Physical Exam  Constitutional:       General: He is not in acute distress.     Appearance: He is ill-appearing. He is not toxic-appearing or diaphoretic.   Pulmonary:      Effort: Pulmonary effort is normal.      Comments: Coughing    Neurological:      Mental Status: He is alert.   Psychiatric:         Mood and Affect: Mood normal.     There were no vitals filed for this visit.   There is no height or weight on file to calculate BMI.        History     Past Medical History:  Past Medical History:   Diagnosis Date    Arthritis     Eye injury 20 yrs ago    nail stuck in od     Fractured bone     right foot    Nuclear sclerosis of left eye 6/25/2018    Retinal detachment 20 yrs ago     od     Squamous cell carcinoma of skin        Past Surgical History:  Past Surgical History:   Procedure Laterality Date    CATARACT EXTRACTION Left 06/25/2018    Dr. Pa    CATARACT EXTRACTION W/  INTRAOCULAR LENS  IMPLANT Right 1999    Dr. Landry    EPIDURAL STEROID INJECTION Left 5/1/2019    Procedure: Axillary, Suprascapular, and Lateral Pectoral Nerve Blocks;  Surgeon: Dameon Austin Jr., MD;  Location: [facility];  Service: Pain Management;  Laterality: Left;  Left Axillary, Suprascapular and Lateral Pectoral Nerve Blocks under Fluoroscopy    29238    Arrive @ 0900; NO Sedation; Confirm with Edmundo; (No DM or anticoag)    EPIDURAL STEROID INJECTION Left 5/16/2019    Procedure: Nerve Radiofrequency Thermocoagulation;  Surgeon: Dameon Austin Jr., MD;  Location: [facility];  Service: Pain Management;  Laterality: Left;  Left Axillary, Suprascapular and Lateral Pectoral Nerve Radiofrequency Ablations    30117    Arrive @ 1100; IV Sedation- Fasting; No Anticoags or DM    EPIDURAL STEROID INJECTION N/A 10/2/2019    Procedure: Injection, Steroid, Epidural Cervical;  Surgeon: Dameon Austin Jr., MD;  Location: [facility];  Service: Pain Management;  Laterality: N/A;  C7-T1 JHOAN    48981    Arrive @ 1130; No ATC or DM    EYE SURGERY      INTRAOCULAR PROSTHESES INSERTION Left 6/25/2018    Procedure: INSERTION-INTRAOCULAR LENS (IOL);  Surgeon: Funmilayo Pa MD;  Location: [facility];  Service: Ophthalmology;  Laterality: Left;    LASIK Bilateral 2000    PHACOEMULSIFICATION OF CATARACT Left 6/25/2018    Procedure: PHACOEMULSIFICATION-ASPIRATION-CATARACT;  Surgeon: Funmilayo Pa MD;  Location: [facility];  Service: Ophthalmology;  Laterality: Left;    RETINAL DETACHMENT SURGERY Right 1999    Dr. Landry       Social History:  Social History     Socioeconomic History    Marital status:    Tobacco Use    Smoking status: Every Day     Packs/day: 1.00     Years: 42.00     Pack years: 42.00     Types: Cigarettes    Smokeless tobacco: Never   Substance and Sexual Activity    Alcohol use: No    Drug use: No    Sexual activity: Yes     Partners: Female     Social Determinants of Health     Financial Resource Strain: Low Risk      Difficulty of Paying Living Expenses: Not hard at all   Food Insecurity: No Food Insecurity    Worried About Running Out of Food in the Last Year: Never true    Ran Out of Food in the Last Year: Never true   Transportation Needs: No Transportation Needs    Lack of Transportation (Medical): No    Lack of Transportation (Non-Medical): No   Physical Activity: Unknown    Days of Exercise per Week: 7 days   Stress: Stress Concern Present    Feeling of Stress : To some extent   Social Connections: Unknown    Frequency of Communication with Friends and Family: More than three times a week    Active Member of Clubs or Organizations: No    Attends Club or Organization Meetings: Never    Marital Status:    Housing Stability: Unknown    Unable to Pay for Housing in the Last Year: No    Unstable Housing in the Last Year: No       Family History:  Family History   Problem Relation Age of Onset    Heart disease Father     Hypertension Father     Diabetes Mother     Stroke Mother     Cancer Mother     Hypertension Mother     Stroke Brother     Diabetes Brother     No Known Problems Sister     No Known Problems Maternal Aunt     No Known Problems Maternal Uncle     No Known Problems Paternal Aunt     No Known Problems Paternal Uncle     No Known Problems Maternal Grandmother     No Known Problems Maternal Grandfather     No Known Problems Paternal Grandmother     No Known Problems Paternal Grandfather     Amblyopia Neg Hx     Blindness Neg Hx     Cataracts Neg Hx     Glaucoma Neg Hx     Macular degeneration Neg Hx     Retinal detachment Neg Hx     Strabismus Neg Hx     Thyroid disease Neg Hx        Allergies and Medications: (updated and reviewed)  Review of patient's allergies indicates:   Allergen Reactions    Penicillins Hives     Current Outpatient Medications   Medication Sig Dispense Refill    albuterol (PROVENTIL/VENTOLIN HFA) 90 mcg/actuation inhaler Inhale 2 puffs into the lungs every 6 (six) hours as needed for  Wheezing or Shortness of Breath. 18 g 0    benzonatate (TESSALON) 200 MG capsule Take 1 capsule (200 mg total) by mouth 3 (three) times daily as needed for Cough. 15 capsule 0    celecoxib (CELEBREX) 200 MG capsule Take 1 capsule (200 mg total) by mouth 2 (two) times daily. 180 capsule 1    gabapentin (NEURONTIN) 800 MG tablet Take 1 tablet (800 mg total) by mouth 3 (three) times daily. 270 tablet 1    guaiFENesin (MUCINEX) 600 mg 12 hr tablet Take 1 tablet (600 mg total) by mouth 2 (two) times daily as needed for Congestion. 14 tablet 0    HYDROcodone-acetaminophen (NORCO) 5-325 mg per tablet Take 1 tablet by mouth every 12 (twelve) hours as needed for Pain. 60 tablet 0    HYDROcodone-acetaminophen (NORCO) 5-325 mg per tablet Take 1 tablet by mouth every 12 (twelve) hours as needed for Pain. 60 tablet 0    [START ON 2/9/2023] HYDROcodone-acetaminophen (NORCO) 5-325 mg per tablet Take 1 tablet by mouth every 12 (twelve) hours as needed for Pain. 60 tablet 0    menthol (RICOLA) 4.8 mg Lozg 1 lozenge by Mucous Membrane route every 2 (two) hours as needed (cough). 21 lozenge 3    nicotine polacrilex 4 MG Lozg Take 1 lozenge (4 mg total) by mouth every 2 (two) hours as needed (smoking cessation). 1 lozenge PO every 3-4 hours as needed for nicotine cravings 24 lozenge 3    nirmatrelvir-ritonavir 300 mg (150 mg x 2)-100 mg copackaged tablets (EUA) Take 3 tablets by mouth 2 (two) times daily for 5 days. Each dose contains 2 nirmatrelvir (pink tablets) and 1 ritonavir (white tablet). Take all 3 tablets together 30 tablet 0    pulse oximeter (PULSE OXIMETER) device by Apply Externally route 2 (two) times a day. Use twice daily at 8 AM and 3 PM and record the value in [employer] as directed. 1 each 0     No current facility-administered medications for this visit.       Patient Care Team:  Yolanda Marques MD as PCP - General (Family Medicine)  Carlita Watts LPN as Licensed Practical Nurse      The patient expressed  understanding and no barriers to adherence were identified.      - The patient indicates understanding of these issues and agrees with the plan. Brief care plan is updated and reviewed with the patient as applicable.      - The patient was sent an After Visit Summary virtually that lists all medications with directions, allergies, education, orders placed during this encounter and follow-up instructions.      - I have reviewed the patient's medical information including past medical, family, and social history sections including the medications and allergies.      - We discussed the patient's current medications.     This note was created by combination of typed  and MModal dictation.  Transcription errors may be present.  If there are any questions, please contact me.       Triston Sandoval NP

## 2023-01-26 NOTE — TELEPHONE ENCOUNTER
----- Message from Ana Laura Ludwig sent at 1/26/2023  7:42 AM CST -----  Contact: Pt [phone]  [email or web address]     Patient is calling for Medical Advice regarding:  runny nose, coughing (extremely hard), headache, congestion, mucus yellowish green    How long has patient had these symptoms: 2 days    Pharmacy name and phone#:   [facility] [place] - [address]  [address]  [place] [address]  Phone: [phone] Fax: [phone]    Would like response via [employer]:  Call back    Comments: positive Covid home test 1/25/2023.    Please call and advise.    Thank You

## 2023-01-27 ENCOUNTER — PATIENT MESSAGE (OUTPATIENT)
Dept: ADMINISTRATIVE | Facility: OTHER | Age: 63
End: 2023-01-27
Payer: COMMERCIAL

## 2023-01-27 ENCOUNTER — NURSE TRIAGE (OUTPATIENT)
Dept: ADMINISTRATIVE | Facility: CLINIC | Age: 63
End: 2023-01-27
Payer: COMMERCIAL

## 2023-01-27 DIAGNOSIS — R05.9 COUGH IN ADULT: ICD-10-CM

## 2023-01-27 DIAGNOSIS — Z72.0 TOBACCO ABUSE: ICD-10-CM

## 2023-01-27 DIAGNOSIS — U07.1 COVID-19: ICD-10-CM

## 2023-01-27 RX ORDER — MENTHOL 4.8 MG
1 LOZENGE MUCOUS MEMBRANE
Qty: 21 LOZENGE | Refills: 3 | Status: SHIPPED | OUTPATIENT
Start: 2023-01-27 | End: 2023-09-06

## 2023-01-27 RX ORDER — ASPIRIN/CALCIUM CARB/MAGNESIUM 325 MG
4 TABLET ORAL
Qty: 24 LOZENGE | Refills: 3 | COMMUNITY
Start: 2023-01-27 | End: 2023-09-11

## 2023-01-27 NOTE — TELEPHONE ENCOUNTER
Called patient to review COVID-19 Surveillance Program enrollment process.    Smartphone: yes     [facility]: yes     Program consent:  yes     Pulse oximeter status: yes     Verified emergency contacts: yes     Program Overview: Reviewed , no response process, and importance of correct emergency contacts in event that well-being check is warranted. Patient will call [phone] 24/7 to speak with an OnCall nurse, if needed. Pt aware that if Sp02 less than or equal to 93% or if they have any worsening symptoms, they need to go to the emergency department. If they are having a medical emergency, they will call 911. Otherwise, patient will continue to submit data as scheduled. Reviewed importance of wearing mask if self or family members leave the house.     Patient had no further questions.     Pt made submission. All VS and symptoms WNL. No additional concerns or questions at this time. Advised to call [facility] with concerns or symptoms. Pt verbalized understanding.

## 2023-01-27 NOTE — PATIENT INSTRUCTIONS
//////////PHONE NUMBERS//////////    Bariatrics---[phone]  Breast Surgery---[phone]  Case Management---[phone]  Colonoscopy---[phone]  Imaging, Xray, CT, MRI, Ultrasound---[phone]  Infectious Disease---[phone]  Interventional Radiology---[phone]  Medical Records---[phone]  [facility] On [facility]---[phone]  DME---[phone]  Optometry/Ophthalmology---[phone]  O Bar---[phone]  Physical Therapy---[phone]  Psychiatry---[phone] or [phone]  Plastic Surgery---[phone]  Sleep Study---[phone]  Smoking Cessation---[phone]  Vaccine Hotline---[phone]  Wound Care---[phone]  COVID Vaccine center---[phone]  Referral Desk---[phone]    /////////////////////////////////////////////////

## 2023-01-27 NOTE — TELEPHONE ENCOUNTER
Reason for Disposition   Information only question and nurse able to answer    Additional Information   Negative: Nursing judgment   Negative: Nursing judgment   Negative: Nursing judgment   Negative: Nursing judgment    Protocols used: Information Only Call - No Triage-A-[place]

## 2023-01-27 NOTE — TELEPHONE ENCOUNTER
----- Message from Audrey Kahn sent at 1/27/2023 10:38 AM CST -----  Type: Patient Call Back    Who called:self     What is the request in detail:    Can the clinic reply by [facility]?    Would the patient rather a call back or a response via [facility]? call    Best call back number:[phone] (home)

## 2023-01-28 ENCOUNTER — PATIENT MESSAGE (OUTPATIENT)
Dept: ADMINISTRATIVE | Facility: OTHER | Age: 63
End: 2023-01-28
Payer: COMMERCIAL

## 2023-01-28 ENCOUNTER — NURSE TRIAGE (OUTPATIENT)
Dept: ADMINISTRATIVE | Facility: CLINIC | Age: 63
End: 2023-01-28
Payer: COMMERCIAL

## 2023-01-28 NOTE — TELEPHONE ENCOUNTER
CC escalation call, sat 93% listed on questionnaire -     Rechecked sat 97%.      Pt said he was symptom free at this time. Info only protocol followed and pt advised to tx at home and call ooc at 1 [phone] if he has any problems. Alert and oriented, Pt has no further questions at this time. Education provided on deep breathing and to go to the ER if his sat is 93% or lower after deep breathing per cc program guidelines. Pt agrees with above. Encounter routed to PCP/staff.   Reason for Disposition   Health Information question, no triage required and triager able to answer question    Protocols used: Information Only Call - No Triage-A-

## 2023-01-29 ENCOUNTER — PATIENT MESSAGE (OUTPATIENT)
Dept: ADMINISTRATIVE | Facility: OTHER | Age: 63
End: 2023-01-29
Payer: COMMERCIAL

## 2023-01-30 ENCOUNTER — NURSE TRIAGE (OUTPATIENT)
Dept: ADMINISTRATIVE | Facility: CLINIC | Age: 63
End: 2023-01-30
Payer: COMMERCIAL

## 2023-01-30 ENCOUNTER — PATIENT MESSAGE (OUTPATIENT)
Dept: ADMINISTRATIVE | Facility: CLINIC | Age: 63
End: 2023-01-30
Payer: COMMERCIAL

## 2023-01-30 ENCOUNTER — PATIENT MESSAGE (OUTPATIENT)
Dept: ADMINISTRATIVE | Facility: OTHER | Age: 63
End: 2023-01-30
Payer: COMMERCIAL

## 2023-01-30 NOTE — TELEPHONE ENCOUNTER
Pt called for covid surveillance no response and pt called and he said that he ran to the store and forgot but will put them in now. Pt will continue to be monitored and will call back if he needs any help.           Reason for Disposition   Information only question and nurse able to answer    Protocols used: Information Only Call - No Triage-A-[place]

## 2023-01-31 ENCOUNTER — PATIENT MESSAGE (OUTPATIENT)
Dept: ADMINISTRATIVE | Facility: OTHER | Age: 63
End: 2023-01-31
Payer: COMMERCIAL

## 2023-02-01 ENCOUNTER — PATIENT MESSAGE (OUTPATIENT)
Dept: ADMINISTRATIVE | Facility: OTHER | Age: 63
End: 2023-02-01
Payer: COMMERCIAL

## 2023-02-02 ENCOUNTER — PATIENT MESSAGE (OUTPATIENT)
Dept: ADMINISTRATIVE | Facility: OTHER | Age: 63
End: 2023-02-02
Payer: COMMERCIAL

## 2023-02-03 ENCOUNTER — PATIENT MESSAGE (OUTPATIENT)
Dept: ADMINISTRATIVE | Facility: OTHER | Age: 63
End: 2023-02-03
Payer: COMMERCIAL

## 2023-02-04 ENCOUNTER — PATIENT MESSAGE (OUTPATIENT)
Dept: ADMINISTRATIVE | Facility: OTHER | Age: 63
End: 2023-02-04
Payer: COMMERCIAL

## 2023-02-05 ENCOUNTER — PATIENT MESSAGE (OUTPATIENT)
Dept: ADMINISTRATIVE | Facility: OTHER | Age: 63
End: 2023-02-05
Payer: COMMERCIAL

## 2023-02-06 ENCOUNTER — PATIENT MESSAGE (OUTPATIENT)
Dept: ADMINISTRATIVE | Facility: OTHER | Age: 63
End: 2023-02-06
Payer: COMMERCIAL

## 2023-02-07 ENCOUNTER — PATIENT MESSAGE (OUTPATIENT)
Dept: ADMINISTRATIVE | Facility: OTHER | Age: 63
End: 2023-02-07
Payer: COMMERCIAL

## 2023-02-08 ENCOUNTER — PATIENT MESSAGE (OUTPATIENT)
Dept: ADMINISTRATIVE | Facility: OTHER | Age: 63
End: 2023-02-08
Payer: COMMERCIAL

## 2023-02-09 ENCOUNTER — PATIENT MESSAGE (OUTPATIENT)
Dept: ADMINISTRATIVE | Facility: OTHER | Age: 63
End: 2023-02-09
Payer: COMMERCIAL

## 2023-02-13 RX ORDER — HYDROCODONE BITARTRATE AND ACETAMINOPHEN 5; 325 MG/1; MG/1
1 TABLET ORAL EVERY 12 HOURS PRN
Qty: 60 TABLET | Refills: 0 | OUTPATIENT
Start: 2023-02-13

## 2023-02-13 NOTE — TELEPHONE ENCOUNTER
----- Message from Gilbert Ricks sent at 2/13/2023 11:01 AM CST -----  Who Called:        Refill or New Rx: refill         RX Name and Strength:   HYDROcodone-acetaminophen (NORCO) 5-325 mg per tablet          Is this a 30 day or 90 day RX      [facility] [place] - [address]  Preferred Pharmacy with phone number:          Local or Mail Order: local           Would the patient rather a call back or a response via [facility]?call back         Best Call Back Number:        Additional Information:

## 2023-02-13 NOTE — TELEPHONE ENCOUNTER
No new care gaps identified.  [facility] Embedded Care Gaps. Reference number: [id number]. 2/13/2023   11:25:15 AM CST

## 2023-03-24 ENCOUNTER — OFFICE VISIT (OUTPATIENT)
Dept: FAMILY MEDICINE | Facility: CLINIC | Age: 63
End: 2023-03-24
Payer: COMMERCIAL

## 2023-03-24 VITALS
TEMPERATURE: 98 F | OXYGEN SATURATION: 97 % | HEART RATE: 65 BPM | WEIGHT: 276.88 LBS | HEIGHT: 73 IN | DIASTOLIC BLOOD PRESSURE: 80 MMHG | SYSTOLIC BLOOD PRESSURE: 130 MMHG | BODY MASS INDEX: 36.7 KG/M2

## 2023-03-24 DIAGNOSIS — M50.30 DEGENERATIVE CERVICAL DISC: ICD-10-CM

## 2023-03-24 DIAGNOSIS — E78.5 HYPERLIPIDEMIA, UNSPECIFIED HYPERLIPIDEMIA TYPE: ICD-10-CM

## 2023-03-24 DIAGNOSIS — Z23 NEED FOR PROPHYLACTIC VACCINATION AGAINST STREPTOCOCCUS PNEUMONIAE (PNEUMOCOCCUS): ICD-10-CM

## 2023-03-24 DIAGNOSIS — M79.671 CHRONIC PAIN OF RIGHT HEEL: Chronic | ICD-10-CM

## 2023-03-24 DIAGNOSIS — R73.03 PREDIABETES: Primary | ICD-10-CM

## 2023-03-24 DIAGNOSIS — G89.29 CHRONIC PAIN OF LEFT ANKLE: Chronic | ICD-10-CM

## 2023-03-24 DIAGNOSIS — Z00.00 GENERAL MEDICAL EXAM: ICD-10-CM

## 2023-03-24 DIAGNOSIS — G89.29 CHRONIC PAIN OF RIGHT HEEL: Chronic | ICD-10-CM

## 2023-03-24 DIAGNOSIS — M25.572 CHRONIC PAIN OF LEFT ANKLE: Chronic | ICD-10-CM

## 2023-03-24 PROCEDURE — 1159F PR MEDICATION LIST DOCUMENTED IN MEDICAL RECORD: ICD-10-PCS | Mod: CPTII,S$GLB,, | Performed by: FAMILY MEDICINE

## 2023-03-24 PROCEDURE — 3008F PR BODY MASS INDEX (BMI) DOCUMENTED: ICD-10-PCS | Mod: CPTII,S$GLB,, | Performed by: FAMILY MEDICINE

## 2023-03-24 PROCEDURE — 90677 PCV20 VACCINE IM: CPT | Mod: S$GLB,,, | Performed by: FAMILY MEDICINE

## 2023-03-24 PROCEDURE — 90677 PNEUMOCOCCAL CONJUGATE VACCINE 20-VALENT: ICD-10-PCS | Mod: S$GLB,,, | Performed by: FAMILY MEDICINE

## 2023-03-24 PROCEDURE — 90471 IMMUNIZATION ADMIN: CPT | Mod: S$GLB,,, | Performed by: FAMILY MEDICINE

## 2023-03-24 PROCEDURE — 90471 PNEUMOCOCCAL CONJUGATE VACCINE 20-VALENT: ICD-10-PCS | Mod: S$GLB,,, | Performed by: FAMILY MEDICINE

## 2023-03-24 PROCEDURE — 99999 PR PBB SHADOW E&M-EST. PATIENT-LVL IV: CPT | Mod: PBBFAC,,, | Performed by: FAMILY MEDICINE

## 2023-03-24 PROCEDURE — 99999 PR PBB SHADOW E&M-EST. PATIENT-LVL IV: ICD-10-PCS | Mod: PBBFAC,,, | Performed by: FAMILY MEDICINE

## 2023-03-24 PROCEDURE — 3075F SYST BP GE 130 - 139MM HG: CPT | Mod: CPTII,S$GLB,, | Performed by: FAMILY MEDICINE

## 2023-03-24 PROCEDURE — 3079F PR MOST RECENT DIASTOLIC BLOOD PRESSURE 80-89 MM HG: ICD-10-PCS | Mod: CPTII,S$GLB,, | Performed by: FAMILY MEDICINE

## 2023-03-24 PROCEDURE — 3008F BODY MASS INDEX DOCD: CPT | Mod: CPTII,S$GLB,, | Performed by: FAMILY MEDICINE

## 2023-03-24 PROCEDURE — 1159F MED LIST DOCD IN RCRD: CPT | Mod: CPTII,S$GLB,, | Performed by: FAMILY MEDICINE

## 2023-03-24 PROCEDURE — 3075F PR MOST RECENT SYSTOLIC BLOOD PRESS GE 130-139MM HG: ICD-10-PCS | Mod: CPTII,S$GLB,, | Performed by: FAMILY MEDICINE

## 2023-03-24 PROCEDURE — 3079F DIAST BP 80-89 MM HG: CPT | Mod: CPTII,S$GLB,, | Performed by: FAMILY MEDICINE

## 2023-03-24 PROCEDURE — 99214 PR OFFICE/OUTPT VISIT, EST, LEVL IV, 30-39 MIN: ICD-10-PCS | Mod: 25,S$GLB,, | Performed by: FAMILY MEDICINE

## 2023-03-24 PROCEDURE — 99214 OFFICE O/P EST MOD 30 MIN: CPT | Mod: 25,S$GLB,, | Performed by: FAMILY MEDICINE

## 2023-03-24 RX ORDER — HYDROCODONE BITARTRATE AND ACETAMINOPHEN 5; 325 MG/1; MG/1
1 TABLET ORAL EVERY 12 HOURS PRN
Qty: 60 TABLET | Refills: 0 | Status: SHIPPED | OUTPATIENT
Start: 2023-04-22 | End: 2023-05-21

## 2023-03-24 RX ORDER — HYDROCODONE BITARTRATE AND ACETAMINOPHEN 5; 325 MG/1; MG/1
1 TABLET ORAL EVERY 12 HOURS PRN
Qty: 60 TABLET | Refills: 0 | Status: SHIPPED | OUTPATIENT
Start: 2023-03-24 | End: 2023-04-26 | Stop reason: SDUPTHER

## 2023-03-24 RX ORDER — HYDROCODONE BITARTRATE AND ACETAMINOPHEN 5; 325 MG/1; MG/1
1 TABLET ORAL EVERY 12 HOURS PRN
Qty: 60 TABLET | Refills: 0 | Status: SHIPPED | OUTPATIENT
Start: 2023-05-21 | End: 2023-06-19

## 2023-03-24 NOTE — PROGRESS NOTES
"Routine Office Visit    Srinivasan Munoz  1960  [id number]      Subjective     Srinivasan is a 63 y.o. male who presents today for:    Shoulder pain - chronic condition for patient - Patient has been evaluated by ortho and pain management. 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 He has severe pain. It limits his range of motion. Patient is currently on gabapentin and celebrex and continues to have severe pain. He takes hydrocodone for severe pain. He walks a lot and has to climb stairs at work. He does not want to stop working.     Objective     Review of Systems   Constitutional:  Negative for chills and fever.   HENT:  Negative for congestion.    Eyes:  Negative for blurred vision.   Respiratory:  Negative for cough.    Cardiovascular:  Negative for chest pain.   Gastrointestinal:  Negative for abdominal pain, constipation, diarrhea, heartburn, nausea and vomiting.   Genitourinary:  Negative for dysuria.   Musculoskeletal:  Negative for myalgias.   Skin:  Negative for itching and rash.   Neurological:  Negative for dizziness and headaches.   Psychiatric/Behavioral:  Negative for depression.      /80   Pulse 65   Temp 98.4 °F (36.9 °C) (Oral)   Ht 6' 1" (1.854 m)   Wt 125.6 kg (276 lb 14.4 oz)   SpO2 97%   BMI 36.53 kg/m²   Physical Exam  Constitutional:       Appearance: He is well-developed.   HENT:      Head: Normocephalic and atraumatic.   Eyes:      Conjunctiva/sclera: Conjunctivae normal.      Pupils: Pupils are equal, round, and reactive to light.   Neck:      Thyroid: No thyromegaly.      Vascular: No JVD.   Cardiovascular:      Rate and Rhythm: Normal rate and regular rhythm.      Heart sounds: Normal heart sounds.   Pulmonary:      Effort: Pulmonary effort is normal.      Breath sounds: Normal breath sounds. No wheezing.   Abdominal:      General: Bowel sounds are normal. There is no distension.      Palpations: Abdomen is soft. "      Tenderness: There is no abdominal tenderness. There is no guarding.   Musculoskeletal:         General: Normal range of motion.      Cervical back: Normal range of motion and neck supple.   Lymphadenopathy:      Cervical: No cervical adenopathy.   Skin:     General: Skin is warm and dry.   Neurological:      Mental Status: He is alert and oriented to person, place, and time.   Psychiatric:         Behavior: Behavior normal.           Assessment     Health Maintenance         Date Due Completion Date    HIV Screening Never done ---    Colorectal Cancer Screening Never done ---    LDCT Lung Screen Never done ---    COVID-19 Vaccine (5 - Booster for Moderna series) 04/22/2022 2/25/2022    Influenza Vaccine (1) 09/01/2022 10/10/2019    Hemoglobin A1c (Prediabetes) 11/07/2023 11/7/2022    TETANUS VACCINE 10/24/2027 10/24/2017    Lipid Panel 11/07/2027 11/7/2022              Problem List Items Addressed This Visit          Neuro    Degenerative cervical disc    Overview     With radiculopathy  Pt previously on gabapentin; would like to restart         Relevant Medications    HYDROcodone-acetaminophen (NORCO) 5-325 mg per tablet    HYDROcodone-acetaminophen (NORCO) 5-325 mg per tablet (Start on 4/22/2023)    HYDROcodone-acetaminophen (NORCO) 5-325 mg per tablet (Start on 5/21/2023)  Reviewed [place]   The current medical regimen is effective;  continue present plan and medications.         Orthopedic    Chronic pain of left ankle (Chronic)    Overview     Hx of fracture  Followed by ortho - recommended surgery  Conservative treatment          Relevant Medications    HYDROcodone-acetaminophen (NORCO) 5-325 mg per tablet    HYDROcodone-acetaminophen (NORCO) 5-325 mg per tablet (Start on 4/22/2023)    HYDROcodone-acetaminophen (NORCO) 5-325 mg per tablet (Start on 5/21/2023)    Chronic pain of right heel (Chronic)    Overview     Hx of fracture  Followed by ortho - who recommended surgery; pt decline  Continue conservative  treatment with nsaids          Relevant Medications    HYDROcodone-acetaminophen (NORCO) 5-325 mg per tablet    HYDROcodone-acetaminophen (NORCO) 5-325 mg per tablet (Start on 4/22/2023)    HYDROcodone-acetaminophen (NORCO) 5-325 mg per tablet (Start on 5/21/2023)     Other Visit Diagnoses       Prediabetes    -  Primary    Relevant Orders    CBC Auto Differential    Comprehensive Metabolic Panel    Lipid Panel    Hemoglobin A1C    TSH  The patient is asked to make an attempt to improve diet and exercise patterns to aid in medical management of this problem.      Hyperlipidemia, unspecified hyperlipidemia type        Relevant Orders    CBC Auto Differential    Comprehensive Metabolic Panel    Lipid Panel    Hemoglobin A1C    TSH    General medical exam        Relevant Orders    HIV 1/2 Ag/Ab (4th Gen)    PSA, SCREENING    Need for prophylactic vaccination against Streptococcus pneumoniae (pneumococcus)        Relevant Orders    (In Office Administered) Pneumococcal Conjugate Vaccine (20 Valent) (IM) (Completed)                  Follow up in about 4 months (around 7/24/2023), or if symptoms worsen or fail to improve.

## 2023-03-31 ENCOUNTER — PATIENT OUTREACH (OUTPATIENT)
Dept: ADMINISTRATIVE | Facility: HOSPITAL | Age: 63
End: 2023-03-31
Payer: COMMERCIAL

## 2023-03-31 ENCOUNTER — OFFICE VISIT (OUTPATIENT)
Dept: PULMONOLOGY | Facility: CLINIC | Age: 63
End: 2023-03-31
Payer: COMMERCIAL

## 2023-03-31 VITALS
DIASTOLIC BLOOD PRESSURE: 75 MMHG | OXYGEN SATURATION: 95 % | WEIGHT: 278.25 LBS | HEART RATE: 65 BPM | BODY MASS INDEX: 36.88 KG/M2 | SYSTOLIC BLOOD PRESSURE: 125 MMHG | HEIGHT: 73 IN

## 2023-03-31 DIAGNOSIS — G47.33 OSA (OBSTRUCTIVE SLEEP APNEA): ICD-10-CM

## 2023-03-31 DIAGNOSIS — G47.8 AWAKENS FROM SLEEP AT NIGHT: ICD-10-CM

## 2023-03-31 DIAGNOSIS — R06.83 SNORING: ICD-10-CM

## 2023-03-31 DIAGNOSIS — Z72.0 TOBACCO ABUSE: Primary | ICD-10-CM

## 2023-03-31 PROCEDURE — 1159F MED LIST DOCD IN RCRD: CPT | Mod: CPTII,S$GLB,, | Performed by: INTERNAL MEDICINE

## 2023-03-31 PROCEDURE — 99204 PR OFFICE/OUTPT VISIT, NEW, LEVL IV, 45-59 MIN: ICD-10-PCS | Mod: S$GLB,,, | Performed by: INTERNAL MEDICINE

## 2023-03-31 PROCEDURE — 99999 PR PBB SHADOW E&M-EST. PATIENT-LVL V: ICD-10-PCS | Mod: PBBFAC,,, | Performed by: INTERNAL MEDICINE

## 2023-03-31 PROCEDURE — 3078F PR MOST RECENT DIASTOLIC BLOOD PRESSURE < 80 MM HG: ICD-10-PCS | Mod: CPTII,S$GLB,, | Performed by: INTERNAL MEDICINE

## 2023-03-31 PROCEDURE — 3008F BODY MASS INDEX DOCD: CPT | Mod: CPTII,S$GLB,, | Performed by: INTERNAL MEDICINE

## 2023-03-31 PROCEDURE — 99999 PR PBB SHADOW E&M-EST. PATIENT-LVL V: CPT | Mod: PBBFAC,,, | Performed by: INTERNAL MEDICINE

## 2023-03-31 PROCEDURE — 3074F PR MOST RECENT SYSTOLIC BLOOD PRESSURE < 130 MM HG: ICD-10-PCS | Mod: CPTII,S$GLB,, | Performed by: INTERNAL MEDICINE

## 2023-03-31 PROCEDURE — 99204 OFFICE O/P NEW MOD 45 MIN: CPT | Mod: S$GLB,,, | Performed by: INTERNAL MEDICINE

## 2023-03-31 PROCEDURE — 1159F PR MEDICATION LIST DOCUMENTED IN MEDICAL RECORD: ICD-10-PCS | Mod: CPTII,S$GLB,, | Performed by: INTERNAL MEDICINE

## 2023-03-31 PROCEDURE — 3078F DIAST BP <80 MM HG: CPT | Mod: CPTII,S$GLB,, | Performed by: INTERNAL MEDICINE

## 2023-03-31 PROCEDURE — 3008F PR BODY MASS INDEX (BMI) DOCUMENTED: ICD-10-PCS | Mod: CPTII,S$GLB,, | Performed by: INTERNAL MEDICINE

## 2023-03-31 PROCEDURE — 3074F SYST BP LT 130 MM HG: CPT | Mod: CPTII,S$GLB,, | Performed by: INTERNAL MEDICINE

## 2023-03-31 NOTE — PROGRESS NOTES
Srinivasan Munoz  was seen as a new patient at the request of  Triston Sandoval, SKYLA for the evaluation of  joy.    CHIEF COMPLAINT:    Chief Complaint   Patient presents with    Snoring       HISTORY OF PRESENT ILLNESS: Srinivasan Munoz is a 63 y.o. male is here for sleep evaluation.   Patient with chronic snoring.  No witnessed apnea during sleep.  Feeling rested upon awake.  No h/o htn.  No parasomnia.  No cataplexy.  No daytime sleepiness.      Chronic back pain with sciatica.  H/o work injury resulting in broken heel.  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  +asbestos exposure 1982 to 1987.  Smoke 2 ppd x 50 years.      [place] Sleepiness Scale score during initial sleep evaluation was 1.    SLEEP ROUTINE:  Activity the hour prior to sleep: watch tv in bed    Bed partner:  wife  Time to bed:  10:30 pm  Lights off:  off   Sleep onset latency:  20-30 minutes with melatonin        Disruptions or awakenings:    none    Wakeup time:      5 am   Perceived sleep quality:  rested        Daytime naps:      none  Weekend sleep routine:      11 pm till 7 am   Caffeine use: 4 energy drink during the day.    exercise habit:   active at work as a repair person.        PAST MEDICAL HISTORY:    Active Ambulatory Problems     Diagnosis Date Noted    BMI 35.0-35.9,adult 07/24/2017    Chronic pain of right heel 07/24/2017    Chronic pain of left ankle 07/24/2017    Degenerative cervical disc 07/24/2017    Nuclear sclerosis of left eye 06/25/2018    Cervical radiculopathy 04/11/2019    Osteoarthritis of spine with radiculopathy, cervical region 04/11/2019    Chronic left shoulder pain 04/11/2019    Glenohumeral arthritis, left 04/11/2019    Acute pain of left shoulder 04/12/2019    Cervical pain 04/12/2019    Neck stiffness 04/12/2019    Numbness and tingling of both upper extremities 04/12/2019    Left shoulder pain 05/01/2019    Tobacco abuse 03/31/2023    JOY (obstructive sleep apnea) 03/31/2023     Resolved Ambulatory  Problems     Diagnosis Date Noted    No Resolved Ambulatory Problems     Past Medical History:   Diagnosis Date    Arthritis     Eye injury 20 yrs ago    Fractured bone     Retinal detachment 20 yrs ago     Squamous cell carcinoma of skin                 PAST SURGICAL HISTORY:    Past Surgical History:   Procedure Laterality Date    CATARACT EXTRACTION Left 06/25/2018    Dr. Pa    CATARACT EXTRACTION W/  INTRAOCULAR LENS IMPLANT Right 1999    Dr. Landry    EPIDURAL STEROID INJECTION Left 5/1/2019    Procedure: Axillary, Suprascapular, and Lateral Pectoral Nerve Blocks;  Surgeon: Dameon Austin Jr., MD;  Location: [facility] ENDO;  Service: Pain Management;  Laterality: Left;  Left Axillary, Suprascapular and Lateral Pectoral Nerve Blocks under Fluoroscopy    55284    Arrive @ 0900; NO Sedation; Confirm with Edmundo; (No DM or anticoag)    EPIDURAL STEROID INJECTION Left 5/16/2019    Procedure: Nerve Radiofrequency Thermocoagulation;  Surgeon: Dameon Austin Jr., MD;  Location: [facility] ENDO;  Service: Pain Management;  Laterality: Left;  Left Axillary, Suprascapular and Lateral Pectoral Nerve Radiofrequency Ablations    36199    Arrive @ 1100; IV Sedation- Fasting; No Anticoags or DM    EPIDURAL STEROID INJECTION N/A 10/2/2019    Procedure: Injection, Steroid, Epidural Cervical;  Surgeon: Dameon Austin Jr., MD;  Location: [facility] ENDO;  Service: Pain Management;  Laterality: N/A;  C7-T1 JHOAN    92512    Arrive @ 1130; No ATC or DM    EYE SURGERY      INTRAOCULAR PROSTHESES INSERTION Left 6/25/2018    Procedure: INSERTION-INTRAOCULAR LENS (IOL);  Surgeon: Funmilayo Pa MD;  Location: [facility];  Service: Ophthalmology;  Laterality: Left;    LASIK Bilateral 2000    PHACOEMULSIFICATION OF CATARACT Left 6/25/2018    Procedure: PHACOEMULSIFICATION-ASPIRATION-CATARACT;  Surgeon: Funmilayo Pa MD;  Location: [facility];  Service: Ophthalmology;  Laterality: Left;    RETINAL DETACHMENT SURGERY Right 1999    Dr. Landry          FAMILY HISTORY:                Family History   Problem Relation Age of Onset    Heart disease Father     Hypertension Father     Diabetes Mother     Stroke Mother     Cancer Mother     Hypertension Mother     Stroke Brother     Diabetes Brother     No Known Problems Sister     No Known Problems Maternal Aunt     No Known Problems Maternal Uncle     No Known Problems Paternal Aunt     No Known Problems Paternal Uncle     No Known Problems Maternal Grandmother     No Known Problems Maternal Grandfather     No Known Problems Paternal Grandmother     No Known Problems Paternal Grandfather     Amblyopia Neg Hx     Blindness Neg Hx     Cataracts Neg Hx     Glaucoma Neg Hx     Macular degeneration Neg Hx     Retinal detachment Neg Hx     Strabismus Neg Hx     Thyroid disease Neg Hx        SOCIAL HISTORY:          Tobacco:   Social History     Tobacco Use   Smoking Status Every Day    Packs/day: 1.00    Years: 42.00    Pack years: 42.00    Types: Cigarettes   Smokeless Tobacco Current       alcohol use:    Social History     Substance and Sexual Activity   Alcohol Use No                 Occupation: ship repair    ALLERGIES:    Review of patient's allergies indicates:   Allergen Reactions    Penicillins Hives       CURRENT MEDICATIONS:    Current Outpatient Medications   Medication Sig Dispense Refill    albuterol (PROVENTIL/VENTOLIN HFA) 90 mcg/actuation inhaler Inhale 2 puffs into the lungs every 6 (six) hours as needed for Wheezing or Shortness of Breath. 18 g 0    benzonatate (TESSALON) 200 MG capsule Take 1 capsule (200 mg total) by mouth 3 (three) times daily as needed for Cough. 15 capsule 0    guaiFENesin (MUCINEX) 600 mg 12 hr tablet Take 1 tablet (600 mg total) by mouth 2 (two) times daily as needed for Congestion. 14 tablet 0    HYDROcodone-acetaminophen (NORCO) 5-325 mg per tablet Take 1 tablet by mouth every 12 (twelve) hours as needed for Pain. 60 tablet 0    [START ON 4/22/2023]  "HYDROcodone-acetaminophen (NORCO) 5-325 mg per tablet Take 1 tablet by mouth every 12 (twelve) hours as needed for Pain. 60 tablet 0    [START ON 5/21/2023] HYDROcodone-acetaminophen (NORCO) 5-325 mg per tablet Take 1 tablet by mouth every 12 (twelve) hours as needed for Pain. 60 tablet 0    menthol (RICOLA) 4.8 mg Lozg 1 lozenge by Mucous Membrane route every 2 (two) hours as needed (cough). 21 lozenge 3    nicotine polacrilex 4 MG Lozg Take 1 lozenge (4 mg total) by mouth every 2 (two) hours as needed (smoking cessation). 1 lozenge PO every 3-4 hours as needed for nicotine cravings 24 lozenge 3    pulse oximeter (PULSE OXIMETER) device by Apply Externally route 2 (two) times a day. Use twice daily at 8 AM and 3 PM and record the value in MyChart as directed. 1 each 0    gabapentin (NEURONTIN) 800 MG tablet Take 1 tablet (800 mg total) by mouth 3 (three) times daily. 270 tablet 1     No current facility-administered medications for this visit.                  REVIEW OF SYSTEMS:     Sleep related symptoms as per HPI.  CONST:Denies weight gain    HEENT: Denies sinus congestion  PULM: Denies dyspnea  CARD:  Denies palpitations   GI:  Denies acid reflux  : Denies polyuria  NEURO: Denies headaches  PSYCH: Denies mood disturbance  HEME: Denies anemia   Otherwise, a balance of systems reviewed is negative.          PHYSICAL EXAM:  Vitals:    03/31/23 1354   BP: 125/75   Pulse: 65   SpO2: 95%   Weight: 126.2 kg (278 lb 3.5 oz)   Height: 6' 1" (1.854 m)   PainSc: 0-No pain     Body mass index is 36.71 kg/m².     GENERAL: Normal development, well groomed  HEENT:  Conjunctivae are non-erythematous; Pupils equal, round, and reactive to light; Nose is symmetrical; Nasal mucosa is pink and moist; Septum is midline; Inferior turbinates are normal; Nasal airflow is normal; Posterior pharynx is pink; Modified Mallampati: 3; Posterior palate is normal; Tonsils +1; Uvula is normal and pink;Tongue is normal; Dentition is fair; No " TMJ tenderness; Jaw opening and protrusion without click and without discomfort.  NECK: Supple. Neck circumference is 17 inches. No thyromegaly. No palpable nodes.     SKIN: On face and neck: No abrasions, no rashes, no lesions.  No subcutaneous nodules are palpable.  RESPIRATORY: Chest is clear to auscultation.  Normal chest expansion and non-labored breathing at rest.  CARDIOVASCULAR: Normal S1, S2.  No murmurs, gallops or rubs. No carotid bruits bilaterally.  EXTREMITIES: No edema. No clubbing. No cyanosis. Station normal. Gait normal.        NEURO/PSYCH: Oriented to time, place and person. Normal attention span and concentration. Affect is full. Mood is normal.                                              DATA no prior sleep study    Lab Results   Component Value Date    TSH 1.939 11/07/2022       ASSESSMENT/PLAN    Problem List Items Addressed This Visit       JOY (obstructive sleep apnea)    Current Assessment & Plan     - The patient symptomatically has snoring with findings of enlarged neck, elevated bmi, high grade mallampatti. This warrants further investigation for possible obstructive sleep apnea.  Patient will be contacted after sleep study is done.            Relevant Orders    Home Sleep Study    Tobacco abuse - Primary    Overview     - declined smoking cessation.  S/p chantix and wellbutrin.  Baseline pft         Relevant Orders    Complete PFT with bronchodilator     Other Visit Diagnoses       Snoring        Awakens from sleep at night                  Diagnostic: Polysomnogram. The nature of this procedure and its indication was discussed with the patient.     Education: During our discussion today, we talked about the etiology of obstructive sleep apnea as well as the potential ramifications of untreated sleep apnea, which could include daytime sleepiness, hypertension, heart disease and/or stroke.     Precautions: The patient was advised to abstain from driving should they feel sleepy or  drowsy.       Thank you for allowing me the opportunity to participate in the care of your patient.    Patient will No follow-ups on file.    Please cc note to  Triston Sandoval NP.    45 minutes of total time spent on the encounter, which includes face to face time and non-face to face time preparing to see the patient (eg, review of tests), Obtaining and/or reviewing separately obtained history, documenting clinical information in the electronic or other health record, independently interpreting results (not separately reported) and communicating results to the patient/family/caregiver, or Care coordination (not separately reported).

## 2023-03-31 NOTE — PATIENT INSTRUCTIONS
Your provider has scheduled you a Sleep Study    What you need to know:    This referral will be sent to your insurance for authorization.  Depending on your insurance company, this process may take two weeks to be authorized.    Once your study has been authorized, Some one from the sleep lab will contact you to schedule your sleep study.   Their number is [phone]. The [facility] is located on 2nd floor of [facility].    We will call you when the sleep study results are ready - if you have not heard from us by 2 weeks from the date of the study, please call [phone].    For information regarding financial obligations, please call [employer] at [phone].    If you study is already scheduled, please call [phone].    Once your study has been completed, it will take up to 14 business days for the results to be sent back to your provider.    If you have any questions you can send a message through your [facility] account, or call the clinic at [phone].    You are advised to abstain from driving should you feel sleepy or drowsy.

## 2023-04-01 NOTE — ASSESSMENT & PLAN NOTE
- The patient symptomatically has snoring with findings of enlarged neck, elevated bmi, high grade mallampatti. This warrants further investigation for possible obstructive sleep apnea.  Patient will be contacted after sleep study is done.

## 2023-04-04 ENCOUNTER — HOSPITAL ENCOUNTER (OUTPATIENT)
Dept: RESPIRATORY THERAPY | Facility: HOSPITAL | Age: 63
Discharge: HOME | End: 2023-04-04
Attending: INTERNAL MEDICINE
Payer: COMMERCIAL

## 2023-04-04 VITALS — RESPIRATION RATE: 18 BRPM | HEART RATE: 55 BPM | OXYGEN SATURATION: 98 %

## 2023-04-04 DIAGNOSIS — Z72.0 TOBACCO ABUSE: ICD-10-CM

## 2023-04-04 LAB
BRPFT: NORMAL
DLCO ADJ PRE: 25.33 ML/(MIN*MMHG)
DLCO SINGLE BREATH LLN: 22.37
DLCO SINGLE BREATH PRE REF: 86.5 %
DLCO SINGLE BREATH REF: 29.29
DLCOC SBVA LLN: 2.87
DLCOC SBVA PRE REF: 102.3 %
DLCOC SBVA REF: 4.01
DLCOC SINGLE BREATH LLN: 22.37
DLCOC SINGLE BREATH PRE REF: 86.5 %
DLCOC SINGLE BREATH REF: 29.29
DLCOVA LLN: 2.87
DLCOVA PRE REF: 102.3 %
DLCOVA PRE: 4.11 ML/(MIN*MMHG*L)
DLCOVA REF: 4.01
DLVAADJ PRE: 4.11 ML/(MIN*MMHG*L)
ERVN2 LLN: -16448.83
ERVN2 PRE REF: 42.6 %
ERVN2 PRE: 0.5 L
ERVN2 REF: 1.17
FEF 25 75 CHG: -15 %
FEF 25 75 LLN: 1.36
FEF 25 75 POST REF: 24.2 %
FEF 25 75 PRE REF: 28.5 %
FEF 25 75 REF: 2.87
FET100 CHG: 33.5 %
FEV1 CHG: 2.6 %
FEV1 FVC CHG: -7.4 %
FEV1 FVC LLN: 64
FEV1 FVC POST REF: 67.7 %
FEV1 FVC PRE REF: 73.1 %
FEV1 FVC REF: 77
FEV1 LLN: 2.66
FEV1 POST REF: 68.1 %
FEV1 PRE REF: 66.4 %
FEV1 REF: 3.57
FRCN2 LLN: 2.7
FRCN2 PRE REF: 103.6 %
FRCN2 REF: 3.69
FVC CHG: 10.8 %
FVC LLN: 3.52
FVC POST REF: 100.3 %
FVC PRE REF: 90.5 %
FVC REF: 4.66
IVC PRE: 3.49 L
IVC SINGLE BREATH LLN: 3.52
IVC SINGLE BREATH PRE REF: 75 %
IVC SINGLE BREATH REF: 4.66
PEF CHG: 6.2 %
PEF LLN: 6.81
PEF POST REF: 69.8 %
PEF PRE REF: 65.7 %
PEF REF: 9.19
POST FEF 25 75: 0.69 L/S
POST FET 100: 18.17 SEC
POST FEV1 FVC: 52.05 %
POST FEV1: 2.43 L
POST FVC: 4.67 L
POST PEF: 6.41 L/S
PRE DLCO: 25.33 ML/(MIN*MMHG)
PRE FEF 25 75: 0.82 L/S
PRE FET 100: 13.61 SEC
PRE FEV1 FVC: 56.22 %
PRE FEV1: 2.37 L
PRE FRC N2: 3.82 L
PRE FVC: 4.21 L
PRE PEF: 6.04 L/S
RVN2 LLN: 1.84
RVN2 PRE REF: 132.1 %
RVN2 PRE: 3.32 L
RVN2 REF: 2.51
RVN2TLCN2 LLN: 29.55
RVN2TLCN2 PRE REF: 115 %
RVN2TLCN2 PRE: 44.33 %
RVN2TLCN2 REF: 38.53
TLCN2 LLN: 6.15
TLCN2 PRE REF: 102.6 %
TLCN2 PRE: 7.49 L
TLCN2 REF: 7.3
VA PRE: 6.2 L
VA SINGLE BREATH LLN: 7.15
VA SINGLE BREATH PRE REF: 86.6 %
VA SINGLE BREATH REF: 7.15
VCMAXN2 LLN: 3.52
VCMAXN2 PRE REF: 89.6 %
VCMAXN2 PRE: 4.17 L
VCMAXN2 REF: 4.66

## 2023-04-04 PROCEDURE — 94727 GAS DIL/WSHOT DETER LNG VOL: CPT

## 2023-04-04 PROCEDURE — 25000242 PHARM REV CODE 250 ALT 637 W/ HCPCS: Performed by: INTERNAL MEDICINE

## 2023-04-04 PROCEDURE — 94060 EVALUATION OF WHEEZING: CPT

## 2023-04-04 PROCEDURE — 94729 DIFFUSING CAPACITY: CPT

## 2023-04-04 PROCEDURE — 94727 GAS DIL/WSHOT DETER LNG VOL: CPT | Mod: 26,,, | Performed by: INTERNAL MEDICINE

## 2023-04-04 PROCEDURE — 94060 PR EVAL OF BRONCHOSPASM: ICD-10-PCS | Mod: 26,,, | Performed by: INTERNAL MEDICINE

## 2023-04-04 PROCEDURE — 94060 EVALUATION OF WHEEZING: CPT | Mod: 26,,, | Performed by: INTERNAL MEDICINE

## 2023-04-04 PROCEDURE — 94727 PR PULM FUNCTION TEST BY GAS: ICD-10-PCS | Mod: 26,,, | Performed by: INTERNAL MEDICINE

## 2023-04-04 PROCEDURE — 94729 PR C02/MEMBANE DIFFUSE CAPACITY: ICD-10-PCS | Mod: 26,,, | Performed by: INTERNAL MEDICINE

## 2023-04-04 PROCEDURE — 94729 DIFFUSING CAPACITY: CPT | Mod: 26,,, | Performed by: INTERNAL MEDICINE

## 2023-04-04 RX ORDER — ALBUTEROL SULFATE 2.5 MG/.5ML
2.5 SOLUTION RESPIRATORY (INHALATION) ONCE
Status: COMPLETED | OUTPATIENT
Start: 2023-04-04 | End: 2023-04-04

## 2023-04-04 RX ADMIN — ALBUTEROL SULFATE 2.5 MG: 2.5 SOLUTION RESPIRATORY (INHALATION) at 01:04

## 2023-04-11 ENCOUNTER — TELEPHONE (OUTPATIENT)
Dept: SLEEP MEDICINE | Facility: HOSPITAL | Age: 63
End: 2023-04-11
Payer: COMMERCIAL

## 2023-04-12 ENCOUNTER — PATIENT MESSAGE (OUTPATIENT)
Dept: ADMINISTRATIVE | Facility: HOSPITAL | Age: 63
End: 2023-04-12
Payer: COMMERCIAL

## 2023-04-26 DIAGNOSIS — G89.29 CHRONIC PAIN OF RIGHT HEEL: Chronic | ICD-10-CM

## 2023-04-26 DIAGNOSIS — G89.29 CHRONIC PAIN OF LEFT ANKLE: Chronic | ICD-10-CM

## 2023-04-26 DIAGNOSIS — M25.572 CHRONIC PAIN OF LEFT ANKLE: Chronic | ICD-10-CM

## 2023-04-26 DIAGNOSIS — M50.30 DEGENERATIVE CERVICAL DISC: ICD-10-CM

## 2023-04-26 DIAGNOSIS — M54.12 CERVICAL RADICULOPATHY: ICD-10-CM

## 2023-04-26 DIAGNOSIS — M79.671 CHRONIC PAIN OF RIGHT HEEL: Chronic | ICD-10-CM

## 2023-04-26 RX ORDER — CELECOXIB 200 MG/1
200 CAPSULE ORAL 2 TIMES DAILY
Qty: 180 CAPSULE | Refills: 0 | Status: CANCELLED | OUTPATIENT
Start: 2023-04-26

## 2023-04-26 RX ORDER — CELECOXIB 200 MG/1
200 CAPSULE ORAL 2 TIMES DAILY
Qty: 180 CAPSULE | Refills: 0 | Status: SHIPPED | OUTPATIENT
Start: 2023-04-26 | End: 2023-10-06

## 2023-04-26 RX ORDER — HYDROCODONE BITARTRATE AND ACETAMINOPHEN 5; 325 MG/1; MG/1
1 TABLET ORAL EVERY 12 HOURS PRN
Qty: 60 TABLET | Refills: 0 | Status: SHIPPED | OUTPATIENT
Start: 2023-04-26 | End: 2023-05-28 | Stop reason: SDUPTHER

## 2023-04-26 NOTE — TELEPHONE ENCOUNTER
----- Message from Harini Luciano sent at 4/26/2023  3:32 PM CDT -----  .Type: Patient Call Back    Who called:self    What is the request in detail: patient wants to know why medication is being denied (Celebrex)    Can the clinic reply by [facility]? yes    Would the patient rather a call back or a response via [facility]? either    Best call back number: .[phone]     Additional Information:

## 2023-04-26 NOTE — TELEPHONE ENCOUNTER
Called patient to let him know that he needs to call the pharmacy or his insurance company to find out why he prescription was denied.we don't have anything to do with why his medication was denied.

## 2023-04-26 NOTE — TELEPHONE ENCOUNTER
No new care gaps identified.  [facility] Embedded Care Gaps. Reference number: [id number]. 4/26/2023   10:01:24 AM CDT

## 2023-04-26 NOTE — TELEPHONE ENCOUNTER
----- Message from Jonna Bhakta MA sent at 4/26/2023 12:17 PM CDT -----  Type: Patient Call Back    Who called: Wife - Ms. Heard     What is the request in detail: pt. Would like his celecoxib (CELEBREX) 200 MG capsule refilled .. he states it was denied a few weeks ago..     Can the clinic reply by [facility]?No    Would the patient rather a call back or a response via [facility]? yes    Best call back number: [phone] (home)

## 2023-05-18 DIAGNOSIS — M50.30 DEGENERATIVE CERVICAL DISC: ICD-10-CM

## 2023-05-18 NOTE — TELEPHONE ENCOUNTER
No care due was identified.  [facility] Embedded Care Due Messages. Reference number: [id number].   5/18/2023 6:26:00 PM CDT

## 2023-05-19 DIAGNOSIS — M50.30 DEGENERATIVE CERVICAL DISC: ICD-10-CM

## 2023-05-19 NOTE — TELEPHONE ENCOUNTER
No care due was identified.  [facility] Embedded Care Due Messages. Reference number: [id number].   5/19/2023 6:29:49 PM CDT

## 2023-05-22 RX ORDER — GABAPENTIN 800 MG/1
800 TABLET ORAL 3 TIMES DAILY
Qty: 270 TABLET | Refills: 1 | Status: SHIPPED | OUTPATIENT
Start: 2023-05-22 | End: 2023-09-06 | Stop reason: SDUPTHER

## 2023-05-22 RX ORDER — GABAPENTIN 800 MG/1
800 TABLET ORAL 3 TIMES DAILY
Qty: 270 TABLET | Refills: 1 | Status: SHIPPED | OUTPATIENT
Start: 2023-05-22

## 2023-05-28 DIAGNOSIS — M50.30 DEGENERATIVE CERVICAL DISC: ICD-10-CM

## 2023-05-28 DIAGNOSIS — M79.671 CHRONIC PAIN OF RIGHT HEEL: Chronic | ICD-10-CM

## 2023-05-28 DIAGNOSIS — G89.29 CHRONIC PAIN OF LEFT ANKLE: Chronic | ICD-10-CM

## 2023-05-28 DIAGNOSIS — M25.572 CHRONIC PAIN OF LEFT ANKLE: Chronic | ICD-10-CM

## 2023-05-28 DIAGNOSIS — G89.29 CHRONIC PAIN OF RIGHT HEEL: Chronic | ICD-10-CM

## 2023-05-28 NOTE — TELEPHONE ENCOUNTER
No care due was identified.  [facility] Embedded Care Due Messages. Reference number: [id number].   5/28/2023 6:38:26 PM CDT

## 2023-05-29 RX ORDER — HYDROCODONE BITARTRATE AND ACETAMINOPHEN 5; 325 MG/1; MG/1
1 TABLET ORAL EVERY 12 HOURS PRN
Qty: 60 TABLET | Refills: 0 | Status: SHIPPED | OUTPATIENT
Start: 2023-05-29 | End: 2023-06-29 | Stop reason: SDUPTHER

## 2023-05-29 NOTE — TELEPHONE ENCOUNTER
----- Message from Harini Luciano sent at 5/29/2023 10:51 AM CDT -----  Regarding: Refill Request  .Type: RX Refill Request    Who Called: self  Have you contacted your pharmacy:no    Refill or New Rx: Refill    RX Name and Strength:HYDROcodone-acetaminophen (NORCO) 5-325 mg per tablet    Preferred Pharmacy with phone number .  [facility] [address] [place] [address] [place] - [address]  [address] [place] [address]  Phone: [phone] Fax: [phone]        Local or Mail Order:local    Ordering Provider: Jerald    Would the patient rather a call back or a response via [facility]? call    Best Call Back Number: .[phone]     Additional Information:

## 2023-06-20 ENCOUNTER — TELEPHONE (OUTPATIENT)
Dept: FAMILY MEDICINE | Facility: CLINIC | Age: 63
End: 2023-06-20
Payer: COMMERCIAL

## 2023-06-20 DIAGNOSIS — H53.8 BLURRED VISION: Primary | ICD-10-CM

## 2023-06-20 DIAGNOSIS — T85.22XD DISLOCATION OF INTRAOCULAR LENS, SUBSEQUENT ENCOUNTER: ICD-10-CM

## 2023-06-20 NOTE — TELEPHONE ENCOUNTER
----- Message from Sid Eaton sent at 6/20/2023 11:37 AM CDT -----  Contact: [phone]  Patient would like to get a referral.  Referral to what specialty:  ophthalmology  Does the patient want the referral with a specific physician:  yes, Dr. Funmilayo Pa  [facility]  Reason (be specific):  Displaced IOL OD  Does the patient already have the specialty clinic appointment scheduled:  NO  If yes, what date is the appointment scheduled:     Is the insurance listed in Epic correct? (this is important for a referral):  yes  Advised patient that once provider approves this either a nurse or  will return their call?: yes  Would the patient like a call back, or a response through their [facility] portal?:   call  Comments:   Dr. Joe Landry with [facility] is recommending the referral ASAP

## 2023-06-29 DIAGNOSIS — M25.572 CHRONIC PAIN OF LEFT ANKLE: Chronic | ICD-10-CM

## 2023-06-29 DIAGNOSIS — G89.29 CHRONIC PAIN OF LEFT ANKLE: Chronic | ICD-10-CM

## 2023-06-29 DIAGNOSIS — G89.29 CHRONIC PAIN OF RIGHT HEEL: Chronic | ICD-10-CM

## 2023-06-29 DIAGNOSIS — M79.671 CHRONIC PAIN OF RIGHT HEEL: Chronic | ICD-10-CM

## 2023-06-29 DIAGNOSIS — M50.30 DEGENERATIVE CERVICAL DISC: ICD-10-CM

## 2023-06-29 NOTE — TELEPHONE ENCOUNTER
----- Message from Zaynab Vallejo sent at 6/29/2023  4:38 PM CDT -----  Type: RX Refill Request    Who Called: pt     Have you contacted your pharmacy:    Refill or New Rx:HYDROcodone-acetaminophen (NORCO) 5-325 mg per tablet    RX Name and Strength:    How is the patient currently taking it? (ex. 1XDay):    Is this a 30 day or 90 day RX:    Preferred Pharmacy with phone number:  [facility] - [address]  [address]  [place] [address]  Phone: [phone] Fax: [phone]        Local or Mail Order:    Ordering Provider:    Would the patient rather a call back or a response via [facility]?     Best Call Back Number:[phone] (home)       Additional Information:

## 2023-06-29 NOTE — TELEPHONE ENCOUNTER
No care due was identified.  [facility] Embedded Care Due Messages. Reference number: [id number].   6/29/2023 7:59:15 AM CDT

## 2023-06-30 ENCOUNTER — PATIENT MESSAGE (OUTPATIENT)
Dept: FAMILY MEDICINE | Facility: CLINIC | Age: 63
End: 2023-06-30
Payer: COMMERCIAL

## 2023-06-30 RX ORDER — HYDROCODONE BITARTRATE AND ACETAMINOPHEN 5; 325 MG/1; MG/1
1 TABLET ORAL EVERY 12 HOURS PRN
Qty: 60 TABLET | Refills: 0 | Status: SHIPPED | OUTPATIENT
Start: 2023-06-30 | End: 2023-07-31 | Stop reason: SDUPTHER

## 2023-07-11 ENCOUNTER — TELEPHONE (OUTPATIENT)
Dept: OPHTHALMOLOGY | Facility: CLINIC | Age: 63
End: 2023-07-11
Payer: COMMERCIAL

## 2023-07-11 NOTE — TELEPHONE ENCOUNTER
----- Message from Shante James MA sent at 7/11/2023  8:10 AM CDT -----  Regarding: FW: Appt Inquiry    ----- Message -----  From: Franca Valles  Sent: 7/11/2023   8:05 AM CDT  To: [facility]RE[facility] Staff  Subject: Appt Inquiry                                     Pts wife called about pts eye drooping more today and pt is having some pain.    Call back- [phone]

## 2023-07-11 NOTE — TELEPHONE ENCOUNTER
----- Message from Shante James MA sent at 7/11/2023  8:32 AM CDT -----  Contact: [phone]    ----- Message -----  From: Bradley Beatty  Sent: 7/11/2023   8:28 AM CDT  To: [facility]ierno [facility]RE[facility] Staff    Pt is having a lot of pain in his eye and headaches. This morning they also noticed some drooping as well. She states that a doctor he went to said that his jeovany's has moved which is causing him to see double and he needed to see Dr. Pa. They were trying to see if there was anything earlier due to the pain he is in. If not they wanted to see if they could be referred to someone that may have something sooner. Please call back to further assist.

## 2023-07-26 ENCOUNTER — OFFICE VISIT (OUTPATIENT)
Dept: OPHTHALMOLOGY | Facility: CLINIC | Age: 63
End: 2023-07-26
Attending: OPHTHALMOLOGY
Payer: COMMERCIAL

## 2023-07-26 DIAGNOSIS — T85.22XA DISLOCATED IOL (INTRAOCULAR LENS), POSTERIOR: Primary | ICD-10-CM

## 2023-07-26 DIAGNOSIS — Z98.890 S/P LASIK (LASER ASSISTED IN SITU KERATOMILEUSIS): ICD-10-CM

## 2023-07-26 PROCEDURE — 99214 PR OFFICE/OUTPT VISIT, EST, LEVL IV, 30-39 MIN: ICD-10-PCS | Mod: S$GLB,,, | Performed by: OPHTHALMOLOGY

## 2023-07-26 PROCEDURE — 99999 PR PBB SHADOW E&M-EST. PATIENT-LVL III: ICD-10-PCS | Mod: PBBFAC,,, | Performed by: OPHTHALMOLOGY

## 2023-07-26 PROCEDURE — 99999 PR PBB SHADOW E&M-EST. PATIENT-LVL III: CPT | Mod: PBBFAC,,, | Performed by: OPHTHALMOLOGY

## 2023-07-26 PROCEDURE — 1160F RVW MEDS BY RX/DR IN RCRD: CPT | Mod: CPTII,S$GLB,, | Performed by: OPHTHALMOLOGY

## 2023-07-26 PROCEDURE — 1159F MED LIST DOCD IN RCRD: CPT | Mod: CPTII,S$GLB,, | Performed by: OPHTHALMOLOGY

## 2023-07-26 PROCEDURE — 92136 IOL MASTER - OU - BOTH EYES: ICD-10-PCS | Mod: RT,S$GLB,, | Performed by: OPHTHALMOLOGY

## 2023-07-26 PROCEDURE — 1160F PR REVIEW ALL MEDS BY PRESCRIBER/CLIN PHARMACIST DOCUMENTED: ICD-10-PCS | Mod: CPTII,S$GLB,, | Performed by: OPHTHALMOLOGY

## 2023-07-26 PROCEDURE — 99214 OFFICE O/P EST MOD 30 MIN: CPT | Mod: S$GLB,,, | Performed by: OPHTHALMOLOGY

## 2023-07-26 PROCEDURE — 92136 OPHTHALMIC BIOMETRY: CPT | Mod: RT,S$GLB,, | Performed by: OPHTHALMOLOGY

## 2023-07-26 PROCEDURE — 1159F PR MEDICATION LIST DOCUMENTED IN MEDICAL RECORD: ICD-10-PCS | Mod: CPTII,S$GLB,, | Performed by: OPHTHALMOLOGY

## 2023-07-26 RX ORDER — PHENYLEPHRINE HYDROCHLORIDE 100 MG/ML
1 SOLUTION/ DROPS OPHTHALMIC
Status: CANCELLED | OUTPATIENT
Start: 2023-07-26

## 2023-07-26 RX ORDER — GANCICLOVIR 1.5 MG/G
GEL OPHTHALMIC 4 TIMES DAILY
COMMUNITY
Start: 2023-07-12

## 2023-07-26 RX ORDER — CYCLOP/TROP/PROPA/PHEN/KET/WAT 1-1-0.1%
1 DROPS (EA) OPHTHALMIC (EYE)
Status: CANCELLED | OUTPATIENT
Start: 2023-07-26

## 2023-07-26 RX ORDER — MOXIFLOXACIN 5 MG/ML
1 SOLUTION/ DROPS OPHTHALMIC
Status: CANCELLED | OUTPATIENT
Start: 2023-07-26

## 2023-07-26 NOTE — PROGRESS NOTES
HPI    Last eye exam was approximately 1 day ago (Dr. Andrew).   Patient was seen by Dr. Landry and was told her a dislocated IOL OD and   needed to be seen by Dr. Pa since he wasn't in network. Started having   double vision and blurred vision OD about a month ago.  Was being seen by   Dr. Andrew for viral infection OD and was told to stay on the Zirgan.     Zirgan QD OD  AT's prn OU  Last edited by Shante James MA on 7/26/2023  1:12 PM.            Assessment /Plan     For exam results, see Encounter Report.    Dislocated IOL (intraocular lens), posterior    S/P LASIK (laser assisted in situ keratomileusis)      Subluxed PMMA IOL OD  Need IOL removal and Yamane IOL OD  [facility] CT Virginie 24.0  1hr      Discussed risks, benefits, and alternatives to surgical intervention. Patient voices understanding and wishes to proceed.

## 2023-07-31 DIAGNOSIS — M50.30 DEGENERATIVE CERVICAL DISC: ICD-10-CM

## 2023-07-31 DIAGNOSIS — G89.29 CHRONIC PAIN OF RIGHT HEEL: Chronic | ICD-10-CM

## 2023-07-31 DIAGNOSIS — M79.671 CHRONIC PAIN OF RIGHT HEEL: Chronic | ICD-10-CM

## 2023-07-31 DIAGNOSIS — M25.572 CHRONIC PAIN OF LEFT ANKLE: Chronic | ICD-10-CM

## 2023-07-31 DIAGNOSIS — G89.29 CHRONIC PAIN OF LEFT ANKLE: Chronic | ICD-10-CM

## 2023-08-01 RX ORDER — HYDROCODONE BITARTRATE AND ACETAMINOPHEN 5; 325 MG/1; MG/1
1 TABLET ORAL EVERY 12 HOURS PRN
Qty: 60 TABLET | Refills: 0 | Status: SHIPPED | OUTPATIENT
Start: 2023-08-01 | End: 2023-09-06

## 2023-08-01 NOTE — TELEPHONE ENCOUNTER
No care due was identified.  [facility] Embedded Care Due Messages. Reference number: [id number].   7/31/2023 7:32:40 PM CDT

## 2023-08-04 ENCOUNTER — TELEPHONE (OUTPATIENT)
Dept: OPHTHALMOLOGY | Facility: CLINIC | Age: 63
End: 2023-08-04
Payer: COMMERCIAL

## 2023-08-04 DIAGNOSIS — T85.22XA DISLOCATED IOL (INTRAOCULAR LENS), POSTERIOR: Primary | ICD-10-CM

## 2023-09-01 ENCOUNTER — TELEPHONE (OUTPATIENT)
Dept: OPHTHALMOLOGY | Facility: CLINIC | Age: 63
End: 2023-09-01
Payer: COMMERCIAL

## 2023-09-01 DIAGNOSIS — G89.29 CHRONIC PAIN OF LEFT ANKLE: Chronic | ICD-10-CM

## 2023-09-01 DIAGNOSIS — M25.572 CHRONIC PAIN OF LEFT ANKLE: Chronic | ICD-10-CM

## 2023-09-01 DIAGNOSIS — G89.29 CHRONIC PAIN OF RIGHT HEEL: Chronic | ICD-10-CM

## 2023-09-01 DIAGNOSIS — M50.30 DEGENERATIVE CERVICAL DISC: ICD-10-CM

## 2023-09-01 DIAGNOSIS — M79.671 CHRONIC PAIN OF RIGHT HEEL: Chronic | ICD-10-CM

## 2023-09-01 RX ORDER — HYDROCODONE BITARTRATE AND ACETAMINOPHEN 5; 325 MG/1; MG/1
1 TABLET ORAL EVERY 12 HOURS PRN
Qty: 60 TABLET | Refills: 0 | OUTPATIENT
Start: 2023-09-01

## 2023-09-01 NOTE — TELEPHONE ENCOUNTER
No care due was identified.  [facility] Embedded Care Due Messages. Reference number: [id number].   9/01/2023 1:03:21 PM CDT

## 2023-09-01 NOTE — TELEPHONE ENCOUNTER
No care due was identified.  [facility] Embedded Care Due Messages. Reference number: [id number].   9/01/2023 10:42:21 AM CDT

## 2023-09-01 NOTE — TELEPHONE ENCOUNTER
Patient given arrival time of 10:15 am on Thursday September 7. Nothing to eat or drink after 9 pm.   Start drops into the operative eye today. [address] [facility]

## 2023-09-01 NOTE — TELEPHONE ENCOUNTER
Patient states he had to cancel his appointment on 8/8 because he was stuck at home. Patient states he reschedule his appointment for the next sooner appointment 2/24 at 11:20 am. Appointment added to the wait list for sooner appointment.

## 2023-09-05 RX ORDER — HYDROCODONE BITARTRATE AND ACETAMINOPHEN 5; 325 MG/1; MG/1
1 TABLET ORAL EVERY 12 HOURS PRN
Qty: 60 TABLET | Refills: 0 | OUTPATIENT
Start: 2023-09-05

## 2023-09-05 NOTE — TELEPHONE ENCOUNTER
Please set up virtual visit for medication refill  It is controlled medication and requires a face to face encounter

## 2023-09-06 ENCOUNTER — ANESTHESIA EVENT (OUTPATIENT)
Dept: SURGERY | Facility: HOSPITAL | Age: 63
End: 2023-09-06
Payer: COMMERCIAL

## 2023-09-06 NOTE — PRE-PROCEDURE INSTRUCTIONS
- Nothing to eat or drink after midinight, except AM meds with small sips of water, Gatorade/Powerade  - Hold all Diabetic meds AM of surgery  - Hold all Insulin AM of surgery  - Hold all Fluid pills AM of surgery  - Hold all non-insulin shots until after surgery (Ozempic, Mounjaro, Trulicity, Victoza, Byetta, Wegovy and Adlyxin) (up to 7 days prior)  - Take all B/P meds, except those that contain a fluid pill  - Hold all vits and herbal meds AM of surgery  - Use inhalers as needed and bring AM of surgery  - Take blood thinner meds AM of surgery  - Use eye drops as directed  - Shower and wash face with dial soap for 3 mins PM prior and AM of surgery  - No powder, lotions, creams, (makeup),  or jewelry    - Wear comfortable clothing (button up shirt)    (Patients ride may not leave while patient is in surgery)    -- 2nd floor surgery ctr at [facility] @ [address] [facility] [address] [place] [address]       Pt voiced understanding

## 2023-09-06 NOTE — ANESTHESIA PREPROCEDURE EVALUATION
09/06/2023  Srinivasan Munoz is a 63 y.o., male.      Pre-op Assessment    I have reviewed the Patient Summary Reports.    I have reviewed the NPO Status.   I have reviewed the Medications.     Review of Systems  Anesthesia Hx:  No problems with previous Anesthesia  Denies Family Hx of Anesthesia complications.   Denies Personal Hx of Anesthesia complications.   Hematology/Oncology:         -- Cancer in past history: surgery  Oncology Comments: Skin cancer     EENT/Dental:   Eyes:   Pulmonary:   Sleep Apnea    Musculoskeletal:   Arthritis   Spine Disorders: cervical Chronic Pain    Neurological:   Neuromuscular Disease,    Endocrine:  Obesity / BMI > 30           Eye injury 20 yrs ago    nail stuck in od     Fractured bone      right foot    Nuclear sclerosis of left eye 6/25/2018    Retinal detachment 20 yrs ago     od     Squamous cell carcinoma of skin                 Past Surgical History:   Procedure Laterality Date    CATARACT EXTRACTION Left 06/25/2018     Dr. Pa    CATARACT EXTRACTION W/  INTRAOCULAR LENS IMPLANT Right 1999     Dr. Landry    EPIDURAL STEROID INJECTION Left 5/1/2019     Procedure: Axillary, Suprascapular, and Lateral Pectoral Nerve Blocks         Physical Exam  General: Well nourished, Cooperative and Alert    Airway:  Mouth Opening: Normal  TM Distance: Normal  Tongue: Normal  Neck ROM: Normal ROM        Anesthesia Plan  Type of Anesthesia, risks & benefits discussed:    Anesthesia Type: MAC  Intra-op Monitoring Plan: Standard ASA Monitors  Post Op Pain Control Plan: multimodal analgesia and IV/PO Opioids PRN  Induction:  IV  Informed Consent: Informed consent signed with the Patient and all parties understand the risks and agree with anesthesia plan.  All questions answered.   ASA Score: 3  Day of Surgery Review of History & Physical: H&P Update referred to the  surgeon/provider.I have interviewed and examined the patient. I have reviewed the patient's H&P dated: There are no significant changes. H&P completed by Anesthesiologist.    Ready For Surgery From Anesthesia Perspective.     .

## 2023-09-06 NOTE — H&P
Pre-operative History and Physical  Ophthalmology Service    CC: Right eye dislocated iol    HPI:   Patient is a 63 y.o. male presents with right eye dislocated IOL requiring surgery as noted in the most recent ophthalmology progress note.    Past Medical History:   Diagnosis Date    Arthritis     Eye injury 20 yrs ago    nail stuck in od     Fractured bone     right foot    Nuclear sclerosis of left eye 6/25/2018    Retinal detachment 20 yrs ago     od     Squamous cell carcinoma of skin        Past Surgical History:   Procedure Laterality Date    CATARACT EXTRACTION Left 06/25/2018    Dr. Pa    CATARACT EXTRACTION W/  INTRAOCULAR LENS IMPLANT Right 1999    Dr. Landry    EPIDURAL STEROID INJECTION Left 5/1/2019    Procedure: Axillary, Suprascapular, and Lateral Pectoral Nerve Blocks;  Surgeon: Dameon Austin Jr., MD;  Location: [facility] ENDO;  Service: Pain Management;  Laterality: Left;  Left Axillary, Suprascapular and Lateral Pectoral Nerve Blocks under Fluoroscopy    [address]    Arrive @ 0900; NO Sedation; Confirm with Edmundo; (No DM or anticoag)    EPIDURAL STEROID INJECTION Left 5/16/2019    Procedure: Nerve Radiofrequency Thermocoagulation;  Surgeon: Dameon Austin Jr., MD;  Location: [facility] ENDO;  Service: Pain Management;  Laterality: Left;  Left Axillary, Suprascapular and Lateral Pectoral Nerve Radiofrequency Ablations    [id number]    Arrive @ 1100; IV Sedation- Fasting; No Anticoags or DM    EPIDURAL STEROID INJECTION N/A 10/2/2019    Procedure: Injection, Steroid, Epidural Cervical;  Surgeon: Dameon Austin Jr., MD;  Location: [facility] ENDO;  Service: Pain Management;  Laterality: N/A;  C7-T1 JHOAN    [id number]    Arrive @ 1130; No ATC or DM    EYE SURGERY      INTRAOCULAR PROSTHESES INSERTION Left 6/25/2018    Procedure: INSERTION-INTRAOCULAR LENS (IOL);  Surgeon: Funmilayo Pa MD;  Location: [facility];  Service: Ophthalmology;  Laterality: Left;    LASIK Bilateral 2000    PHACOEMULSIFICATION OF CATARACT Left  6/25/2018    Procedure: PHACOEMULSIFICATION-ASPIRATION-CATARACT;  Surgeon: Funmilayo Pa MD;  Location: [facility];  Service: Ophthalmology;  Laterality: Left;    RETINAL DETACHMENT SURGERY Right 1999    Dr. Landry       Family History   Problem Relation Age of Onset    Heart disease Father     Hypertension Father     Diabetes Mother     Stroke Mother     Cancer Mother     Hypertension Mother     Stroke Brother     Diabetes Brother     No Known Problems Sister     No Known Problems Maternal Aunt     No Known Problems Maternal Uncle     No Known Problems Paternal Aunt     No Known Problems Paternal Uncle     No Known Problems Maternal Grandmother     No Known Problems Maternal Grandfather     No Known Problems Paternal Grandmother     No Known Problems Paternal Grandfather     Amblyopia Neg Hx     Blindness Neg Hx     Cataracts Neg Hx     Glaucoma Neg Hx     Macular degeneration Neg Hx     Retinal detachment Neg Hx     Strabismus Neg Hx     Thyroid disease Neg Hx        Social History     Socioeconomic History    Marital status:    Tobacco Use    Smoking status: Every Day     Current packs/day: 1.00     Average packs/day: 1 pack/day for 42.0 years (42.0 ttl pk-yrs)     Types: Cigarettes    Smokeless tobacco: Current   Substance and Sexual Activity    Alcohol use: No    Drug use: No    Sexual activity: Yes     Partners: Female     Social Determinants of Health     Financial Resource Strain: Low Risk  (1/26/2023)    Overall Financial Resource Strain (CARDIA)     Difficulty of Paying Living Expenses: Not hard at all   Food Insecurity: No Food Insecurity (1/26/2023)    Hunger Vital Sign     Worried About Running Out of Food in the Last Year: Never true     Ran Out of Food in the Last Year: Never true   Transportation Needs: No Transportation Needs (1/26/2023)    PRAPARE - Transportation     Lack of Transportation (Medical): No     Lack of Transportation (Non-Medical): No   Physical Activity: Unknown (1/26/2023)     Exercise Vital Sign     Days of Exercise per Week: 7 days   Stress: Stress Concern Present (1/26/2023)    [employer] [place] [employer] - Occupational Stress Questionnaire     Feeling of Stress : To some extent   Social Connections: Unknown (1/26/2023)    Social Connection and Isolation Panel [NHANES]     Frequency of Communication with Friends and Family: More than three times a week     Active Member of Clubs or Organizations: No     Attends Club or Organization Meetings: Never     Marital Status:    Housing Stability: Unknown (1/26/2023)    Housing Stability Vital Sign     Unable to Pay for Housing in the Last Year: No     Unstable Housing in the Last Year: No       No current facility-administered medications for this encounter.     Current Outpatient Medications   Medication Sig Dispense Refill    albuterol (PROVENTIL/VENTOLIN HFA) 90 mcg/actuation inhaler Inhale 2 puffs into the lungs every 6 (six) hours as needed for Wheezing or Shortness of Breath. 18 g 0    benzonatate (TESSALON) 200 MG capsule Take 1 capsule (200 mg total) by mouth 3 (three) times daily as needed for Cough. 15 capsule 0    celecoxib (CELEBREX) 200 MG capsule Take 1 capsule (200 mg total) by mouth 2 (two) times daily. 180 capsule 0    gabapentin (NEURONTIN) 800 MG tablet Take 1 tablet (800 mg total) by mouth 3 (three) times daily. 270 tablet 1    gabapentin (NEURONTIN) 800 MG tablet Take 1 tablet (800 mg total) by mouth 3 (three) times daily. 270 tablet 1    gabapentin (NEURONTIN) 800 MG tablet TAKE 1 TABLET BY MOUTH THREE TIMES DAILY 270 tablet 0    guaiFENesin (MUCINEX) 600 mg 12 hr tablet Take 1 tablet (600 mg total) by mouth 2 (two) times daily as needed for Congestion. 14 tablet 0    HYDROcodone-acetaminophen (NORCO) 5-325 mg per tablet Take 1 tablet by mouth every 12 (twelve) hours as needed for Pain. 60 tablet 0    menthol (RICOLA) 4.8 mg Lozg 1 lozenge by Mucous Membrane route every 2 (two) hours as needed (cough).  21 lozenge 3    nicotine polacrilex 4 MG Lozg Take 1 lozenge (4 mg total) by mouth every 2 (two) hours as needed (smoking cessation). 1 lozenge PO every 3-4 hours as needed for nicotine cravings 24 lozenge 3    pulse oximeter (PULSE OXIMETER) device by Apply Externally route 2 (two) times a day. Use twice daily at 8 AM and 3 PM and record the value in MyChart as directed. 1 each 0    ZIRGAN 0.15 % Gel Place into the right eye 4 (four) times daily.         Review of patient's allergies indicates:   Allergen Reactions    Penicillins Hives         Review of systems:  Gen: denies fevers, chills, nausea, vomitting, weight changes  HEENT: Denies discharge or coughing/sneezing. +blurry vision Right eye   Resp: Denies SOB  CV: Denies CP or palpitations  Abd: Denies tenderness, diarrhea, constipation, nausea  Ext:  Denies pain or edema    Physical Exam  BP: Vital signs stable  General: No apparent distress  HEENT: Normocephalic, atraumatic, see past ophtho note for eye exam OD  Lungs: Adequate respirations, no respiratory distress  Heart: Pulses intact  Abdomen: Soft, no distention  Rectal/pelvic: Deferred    Assessment  Patient is a 63 y.o. male with eye problem right eye dislocated iol Right eye   - Risks/benefits/alternatives of the procedure including, but not limited to scarring, bleeding, infection, loss or decreased vision, and/or need for possible repeat surgery discussed with the patient and/or family, and Informed consent obtained prior to surgery and the patient/family voiced good understanding, witnessed, and placed in chart.  - Will proceed with IOL extraction, IOL placement via yamane technique OD  - Plan for local/MAC   - okay to continue any blood thinner

## 2023-09-07 ENCOUNTER — ANESTHESIA (OUTPATIENT)
Dept: SURGERY | Facility: HOSPITAL | Age: 63
End: 2023-09-07
Payer: COMMERCIAL

## 2023-09-07 ENCOUNTER — HOSPITAL ENCOUNTER (OUTPATIENT)
Facility: HOSPITAL | Age: 63
Discharge: HOME | End: 2023-09-07
Attending: OPHTHALMOLOGY | Admitting: OPHTHALMOLOGY
Payer: COMMERCIAL

## 2023-09-07 VITALS
HEIGHT: 73 IN | BODY MASS INDEX: 35.92 KG/M2 | HEART RATE: 51 BPM | WEIGHT: 271 LBS | SYSTOLIC BLOOD PRESSURE: 130 MMHG | OXYGEN SATURATION: 97 % | DIASTOLIC BLOOD PRESSURE: 67 MMHG | RESPIRATION RATE: 18 BRPM | TEMPERATURE: 98 F

## 2023-09-07 DIAGNOSIS — T85.22XA DISLOCATED IOL (INTRAOCULAR LENS), POSTERIOR: ICD-10-CM

## 2023-09-07 DIAGNOSIS — Z98.890 S/P LASIK (LASER ASSISTED IN SITU KERATOMILEUSIS): ICD-10-CM

## 2023-09-07 PROCEDURE — 67036 REMOVAL OF INNER EYE FLUID: CPT | Mod: 51,RT,, | Performed by: OPHTHALMOLOGY

## 2023-09-07 PROCEDURE — 36000707: Performed by: OPHTHALMOLOGY

## 2023-09-07 PROCEDURE — 94761 N-INVAS EAR/PLS OXIMETRY MLT: CPT

## 2023-09-07 PROCEDURE — D9220A PRA ANESTHESIA: Mod: ANES,,, | Performed by: ANESTHESIOLOGY

## 2023-09-07 PROCEDURE — 66682 REPAIR IRIS & CILIARY BODY: CPT | Mod: 51,RT,, | Performed by: OPHTHALMOLOGY

## 2023-09-07 PROCEDURE — 37000009 HC ANESTHESIA EA ADD 15 MINS: Performed by: OPHTHALMOLOGY

## 2023-09-07 PROCEDURE — 37000008 HC ANESTHESIA 1ST 15 MINUTES: Performed by: OPHTHALMOLOGY

## 2023-09-07 PROCEDURE — D9220A PRA ANESTHESIA: ICD-10-PCS | Mod: CRNA,,, | Performed by: NURSE ANESTHETIST, CERTIFIED REGISTERED

## 2023-09-07 PROCEDURE — D9220A PRA ANESTHESIA: ICD-10-PCS | Mod: ANES,,, | Performed by: ANESTHESIOLOGY

## 2023-09-07 PROCEDURE — 66986 EXCHANGE LENS PROSTHESIS: CPT | Mod: RT,,, | Performed by: OPHTHALMOLOGY

## 2023-09-07 PROCEDURE — 66986 PR EXCHANGE LENS PROSTHESIS: ICD-10-PCS | Mod: RT,,, | Performed by: OPHTHALMOLOGY

## 2023-09-07 PROCEDURE — 99900035 HC TECH TIME PER 15 MIN (STAT)

## 2023-09-07 PROCEDURE — 27201423 OPTIME MED/SURG SUP & DEVICES STERILE SUPPLY: Performed by: OPHTHALMOLOGY

## 2023-09-07 PROCEDURE — 36000706: Performed by: OPHTHALMOLOGY

## 2023-09-07 PROCEDURE — 25000003 PHARM REV CODE 250: Performed by: ANESTHESIOLOGY

## 2023-09-07 PROCEDURE — 25000003 PHARM REV CODE 250: Performed by: OPHTHALMOLOGY

## 2023-09-07 PROCEDURE — V2632 POST CHMBR INTRAOCULAR LENS: HCPCS | Performed by: OPHTHALMOLOGY

## 2023-09-07 PROCEDURE — 63600175 PHARM REV CODE 636 W HCPCS: Performed by: ANESTHESIOLOGY

## 2023-09-07 PROCEDURE — 63600175 PHARM REV CODE 636 W HCPCS: Performed by: OPHTHALMOLOGY

## 2023-09-07 PROCEDURE — 66682 PR SUTURE IRIS/CILIARY BODY: ICD-10-PCS | Mod: 51,RT,, | Performed by: OPHTHALMOLOGY

## 2023-09-07 PROCEDURE — 71000015 HC POSTOP RECOV 1ST HR: Performed by: OPHTHALMOLOGY

## 2023-09-07 PROCEDURE — D9220A PRA ANESTHESIA: Mod: CRNA,,, | Performed by: NURSE ANESTHETIST, CERTIFIED REGISTERED

## 2023-09-07 PROCEDURE — 67036 PR VITRECTOMY,MECHANICAL: ICD-10-PCS | Mod: 51,RT,, | Performed by: OPHTHALMOLOGY

## 2023-09-07 RX ORDER — MOXIFLOXACIN 5 MG/ML
1 SOLUTION/ DROPS OPHTHALMIC
Status: DISCONTINUED | OUTPATIENT
Start: 2023-09-07 | End: 2023-09-07 | Stop reason: HOSPADM

## 2023-09-07 RX ORDER — ACETAMINOPHEN 325 MG/1
650 TABLET ORAL EVERY 4 HOURS PRN
Status: DISCONTINUED | OUTPATIENT
Start: 2023-09-07 | End: 2023-09-07 | Stop reason: HOSPADM

## 2023-09-07 RX ORDER — LIDOCAINE HYDROCHLORIDE 20 MG/ML
INJECTION, SOLUTION INFILTRATION; PERINEURAL
Status: DISCONTINUED | OUTPATIENT
Start: 2023-09-07 | End: 2023-09-07 | Stop reason: HOSPADM

## 2023-09-07 RX ORDER — DEXMEDETOMIDINE HYDROCHLORIDE 100 UG/ML
INJECTION, SOLUTION INTRAVENOUS
Status: DISCONTINUED | OUTPATIENT
Start: 2023-09-07 | End: 2023-09-07

## 2023-09-07 RX ORDER — MIDAZOLAM HYDROCHLORIDE 1 MG/ML
INJECTION, SOLUTION INTRAMUSCULAR; INTRAVENOUS
Status: DISCONTINUED | OUTPATIENT
Start: 2023-09-07 | End: 2023-09-07

## 2023-09-07 RX ORDER — HYDROCODONE BITARTRATE AND ACETAMINOPHEN 5; 325 MG/1; MG/1
1 TABLET ORAL EVERY 4 HOURS PRN
Status: DISCONTINUED | OUTPATIENT
Start: 2023-09-07 | End: 2023-09-07 | Stop reason: HOSPADM

## 2023-09-07 RX ORDER — CYCLOP/TROP/PROPA/PHEN/KET/WAT 1-1-0.1%
1 DROPS (EA) OPHTHALMIC (EYE)
Status: COMPLETED | OUTPATIENT
Start: 2023-09-07 | End: 2023-09-07

## 2023-09-07 RX ORDER — GENTAMICIN SULFATE 40 MG/ML
INJECTION, SOLUTION INTRAMUSCULAR; INTRAVENOUS
Status: DISCONTINUED | OUTPATIENT
Start: 2023-09-07 | End: 2023-09-07 | Stop reason: HOSPADM

## 2023-09-07 RX ORDER — BUPIVACAINE HYDROCHLORIDE 7.5 MG/ML
INJECTION, SOLUTION EPIDURAL; RETROBULBAR
Status: DISCONTINUED | OUTPATIENT
Start: 2023-09-07 | End: 2023-09-07 | Stop reason: HOSPADM

## 2023-09-07 RX ORDER — FENTANYL CITRATE 50 UG/ML
INJECTION, SOLUTION INTRAMUSCULAR; INTRAVENOUS
Status: DISCONTINUED | OUTPATIENT
Start: 2023-09-07 | End: 2023-09-07

## 2023-09-07 RX ORDER — PREDNISOLONE ACETATE 10 MG/ML
1 SUSPENSION/ DROPS OPHTHALMIC 3 TIMES DAILY
COMMUNITY

## 2023-09-07 RX ORDER — DEXAMETHASONE SODIUM PHOSPHATE 4 MG/ML
INJECTION, SOLUTION INTRA-ARTICULAR; INTRALESIONAL; INTRAMUSCULAR; INTRAVENOUS; SOFT TISSUE
Status: DISCONTINUED | OUTPATIENT
Start: 2023-09-07 | End: 2023-09-07 | Stop reason: HOSPADM

## 2023-09-07 RX ORDER — OFLOXACIN 3 MG/ML
1 SOLUTION/ DROPS OPHTHALMIC 3 TIMES DAILY
COMMUNITY

## 2023-09-07 RX ORDER — PHENYLEPHRINE HYDROCHLORIDE 100 MG/ML
1 SOLUTION/ DROPS OPHTHALMIC
Status: DISCONTINUED | OUTPATIENT
Start: 2023-09-07 | End: 2023-09-07 | Stop reason: HOSPADM

## 2023-09-07 RX ADMIN — Medication 1 DROP: at 11:09

## 2023-09-07 RX ADMIN — MIDAZOLAM HYDROCHLORIDE 2 MG: 1 INJECTION, SOLUTION INTRAMUSCULAR; INTRAVENOUS at 01:09

## 2023-09-07 RX ADMIN — MOXIFLOXACIN OPHTHALMIC 1 DROP: 5 SOLUTION/ DROPS OPHTHALMIC at 11:09

## 2023-09-07 RX ADMIN — FENTANYL CITRATE 50 MCG: 50 INJECTION, SOLUTION INTRAMUSCULAR; INTRAVENOUS at 01:09

## 2023-09-07 RX ADMIN — DEXMEDETOMIDINE HYDROCHLORIDE 4 MCG: 100 INJECTION, SOLUTION INTRAVENOUS at 01:09

## 2023-09-07 RX ADMIN — MIDAZOLAM HYDROCHLORIDE 1 MG: 1 INJECTION, SOLUTION INTRAMUSCULAR; INTRAVENOUS at 01:09

## 2023-09-07 RX ADMIN — GLYCOPYRROLATE 0.2 MG: 0.2 INJECTION, SOLUTION INTRAMUSCULAR; INTRAVENOUS at 01:09

## 2023-09-07 NOTE — DISCHARGE INSTRUCTIONS
Day of Surgery    If eye is patched leave patch in place. Dr Pa or one of his assistants will remove it at your follow up appointment.   If your eye is not patched continue eye drops as before surgery.   You should expect to experience mild pain and discomfort; which Ibuprofen should help to ease. If you experience severe pain or nauea, call Dr Pa or the on call doctor at [phone].  Plan to see Dr Pa tomorrow at : [facility]. [address].       Starting the day after Surgery    Do not rub your eye.   Continue eye drops as advised by Dr Pa  Protect your eye from injury with the eye shield at night and protective sunglasses during the day for 1 week.   You may bathe/shower/wash face normally  Do not apply makeup around operative eye for 1 week.   Plan to see Dr Pa in 7-10 days for post operative check.

## 2023-09-07 NOTE — DISCHARGE SUMMARY
Outcome: Successful outpatient ophthalmic surgical procedure  Preprinted Instructions given to patient.  Regular diet.  Activity: No restrictions  Meds: see Med Rec  Condition: stable  Follow up: 1 day with Dr Pa  Disposition: Home  Diagnosis: s/p eye surgery  Date of discharge: 09/07/2023

## 2023-09-07 NOTE — TRANSFER OF CARE
"Anesthesia Transfer of Care Note    Patient: Srinivasan Munoz    Procedure(s) Performed: Procedure(s) (LRB):  REPOSITIONING, IOL (Right)    Patient location: OPS    Anesthesia Type: MAC    Transport from OR: Transported from OR on room air with adequate spontaneous ventilation    Post pain: adequate analgesia    Post assessment: no apparent anesthetic complications    Post vital signs: stable    Level of consciousness: awake, alert and oriented    Nausea/Vomiting: no nausea/vomiting    Complications: none    Transfer of care protocol was followed      Last vitals:   Visit Vitals  BP (!) 127/59   Pulse 69   Temp 36.5 °C (97.7 °F)   Resp 14   Ht 6' 1" (1.854 m)   Wt 122.9 kg (271 lb)   SpO2 100%   BMI 35.75 kg/m²     "

## 2023-09-07 NOTE — PLAN OF CARE
Discharge instructions given and explained to patient and spouse with verbalization of understanding all instructions. Patient is AAOx3,v/s stable, denies n/v and tolerating po, rates pain level tolerable, IV removed, and family at bedside. Patient discharged to home. Patient transported off unit via wheelchair to private vehicle with family.

## 2023-09-07 NOTE — ANESTHESIA POSTPROCEDURE EVALUATION
Anesthesia Post Evaluation    Patient: Srinivasan Munoz    Procedure(s) Performed: Procedure(s) (LRB):  REPOSITIONING, IOL (Right)    Final Anesthesia Type: MAC      Patient location during evaluation: PACU  Patient participation: Yes- Able to Participate  Level of consciousness: awake and alert  Post-procedure vital signs: reviewed and stable  Pain management: adequate  Airway patency: patent    PONV status at discharge: No PONV  Anesthetic complications: no      Cardiovascular status: blood pressure returned to baseline  Respiratory status: unassisted, spontaneous ventilation and room air  Hydration status: euvolemic  Follow-up not needed.          Vitals Value Taken Time   /59 09/07/23 1456   Temp 36.5 °C (97.7 °F) 09/07/23 1456   Pulse 69 09/07/23 1456   Resp 14 09/07/23 1456   SpO2 100 % 09/07/23 1456         No case tracking events are documented in the log.      Pain/Nica Score: Nica Score: 10 (9/7/2023  2:53 PM)

## 2023-09-07 NOTE — PLAN OF CARE
Chart reviewed. Preop nursing care completed per orders. Safe surgery checklist complete. Pt denies any open wounds cuts or sores.Pt denies any metal in body.Family at bedside and belongings under stretcher. Waiting for anesthesia consent prior to surgery. Pt AAOX3, VSS on room air. Pt toileted, Bed locked in lowest position, Call light within reach. Pt denies any needs at this time. Will continue to monitor.

## 2023-09-08 ENCOUNTER — TELEPHONE (OUTPATIENT)
Dept: FAMILY MEDICINE | Facility: CLINIC | Age: 63
End: 2023-09-08
Payer: COMMERCIAL

## 2023-09-08 ENCOUNTER — OFFICE VISIT (OUTPATIENT)
Dept: OPHTHALMOLOGY | Facility: CLINIC | Age: 63
End: 2023-09-08
Payer: COMMERCIAL

## 2023-09-08 DIAGNOSIS — Z98.890 POST-OPERATIVE STATE: Primary | ICD-10-CM

## 2023-09-08 DIAGNOSIS — T85.22XA DISLOCATED IOL (INTRAOCULAR LENS), POSTERIOR: ICD-10-CM

## 2023-09-08 PROCEDURE — 1159F MED LIST DOCD IN RCRD: CPT | Mod: CPTII,S$GLB,, | Performed by: OPHTHALMOLOGY

## 2023-09-08 PROCEDURE — 99024 PR POST-OP FOLLOW-UP VISIT: ICD-10-PCS | Mod: S$GLB,,, | Performed by: OPHTHALMOLOGY

## 2023-09-08 PROCEDURE — 99999 PR PBB SHADOW E&M-EST. PATIENT-LVL III: CPT | Mod: PBBFAC,,, | Performed by: OPHTHALMOLOGY

## 2023-09-08 PROCEDURE — 99999 PR PBB SHADOW E&M-EST. PATIENT-LVL III: ICD-10-PCS | Mod: PBBFAC,,, | Performed by: OPHTHALMOLOGY

## 2023-09-08 PROCEDURE — 1159F PR MEDICATION LIST DOCUMENTED IN MEDICAL RECORD: ICD-10-PCS | Mod: CPTII,S$GLB,, | Performed by: OPHTHALMOLOGY

## 2023-09-08 PROCEDURE — 1160F PR REVIEW ALL MEDS BY PRESCRIBER/CLIN PHARMACIST DOCUMENTED: ICD-10-PCS | Mod: CPTII,S$GLB,, | Performed by: OPHTHALMOLOGY

## 2023-09-08 PROCEDURE — 99024 POSTOP FOLLOW-UP VISIT: CPT | Mod: S$GLB,,, | Performed by: OPHTHALMOLOGY

## 2023-09-08 PROCEDURE — 1160F RVW MEDS BY RX/DR IN RCRD: CPT | Mod: CPTII,S$GLB,, | Performed by: OPHTHALMOLOGY

## 2023-09-08 RX ORDER — CYCLOBENZAPRINE HCL 10 MG
TABLET ORAL
COMMUNITY
End: 2023-11-02

## 2023-09-08 RX ORDER — GABAPENTIN 300 MG/1
CAPSULE ORAL
COMMUNITY
End: 2023-11-02

## 2023-09-08 RX ORDER — CLOSTRIDIUM TETANI TOXOID ANTIGEN (FORMALDEHYDE INACTIVATED), CORYNEBACTERIUM DIPHTHERIAE TOXOID ANTIGEN (FORMALDEHYDE INACTIVATED), BORDETELLA PERTUSSIS TOXOID ANTIGEN (GLUTARALDEHYDE INACTIVATED), BORDETELLA PERTUSSIS FILAMENTOUS HEMAGGLUTININ ANTIGEN (FORMALDEHYDE INACTIVATED), BORDETELLA PERTUSSIS PERTACTIN ANTIGEN, AND BORDETELLA PERTUSSIS FIMBRIAE 2/3 ANTIGEN 5; 2; 2.5; 5; 3; 5 [LF]/.5ML; [LF]/.5ML; UG/.5ML; UG/.5ML; UG/.5ML; UG/.5ML
INJECTION, SUSPENSION INTRAMUSCULAR
COMMUNITY

## 2023-09-08 RX ORDER — PREDNISONE 20 MG/1
TABLET ORAL
COMMUNITY
End: 2023-11-02

## 2023-09-08 RX ORDER — HYDROCODONE BITARTRATE AND ACETAMINOPHEN 7.5; 325 MG/1; MG/1
TABLET ORAL
COMMUNITY
End: 2023-09-11 | Stop reason: SDUPTHER

## 2023-09-08 RX ORDER — GABAPENTIN 600 MG/1
TABLET ORAL
COMMUNITY
End: 2023-11-02

## 2023-09-08 RX ORDER — TIZANIDINE 4 MG/1
TABLET ORAL
COMMUNITY
End: 2023-11-02

## 2023-09-08 RX ORDER — NAPROXEN 500 MG/1
TABLET ORAL
COMMUNITY
End: 2023-11-02

## 2023-09-08 RX ORDER — TIZANIDINE 2 MG/1
TABLET ORAL
COMMUNITY
End: 2023-11-02

## 2023-09-08 NOTE — PROGRESS NOTES
HPI    09/07/2023 IMPLANT: ZEISS CT EWA 24.0 OD    Patient here for 1 day phaco OD    Patient states he is experiencing double vision shortly after removing eye   patch. The double vision only occurs when looking to the right. Patient   denies seeing double when looking straight ahead and to the left. Patient   states OD is feeling sore. He states it feels like something is poking in   OD when looking towards the right. Patient is not using any eye drops at   this time.   Last edited by Kelli Alvarado on 9/8/2023 11:13 AM.            Assessment /Plan     For exam results, see Encounter Report.    Post-operative state    Dislocated IOL (intraocular lens), posterior      POD1 Yamane IOL OS with good result  Routine gtts

## 2023-09-11 ENCOUNTER — TELEPHONE (OUTPATIENT)
Dept: OPHTHALMOLOGY | Facility: CLINIC | Age: 63
End: 2023-09-11
Payer: COMMERCIAL

## 2023-09-11 ENCOUNTER — OFFICE VISIT (OUTPATIENT)
Dept: FAMILY MEDICINE | Facility: CLINIC | Age: 63
End: 2023-09-11
Payer: COMMERCIAL

## 2023-09-11 VITALS — HEART RATE: 62 BPM | OXYGEN SATURATION: 98 % | BODY MASS INDEX: 35.75 KG/M2 | HEIGHT: 73 IN

## 2023-09-11 DIAGNOSIS — M50.30 DEGENERATIVE CERVICAL DISC: Primary | ICD-10-CM

## 2023-09-11 DIAGNOSIS — M25.512 CHRONIC LEFT SHOULDER PAIN: ICD-10-CM

## 2023-09-11 DIAGNOSIS — G89.29 CHRONIC LEFT SHOULDER PAIN: ICD-10-CM

## 2023-09-11 DIAGNOSIS — M47.22 OSTEOARTHRITIS OF SPINE WITH RADICULOPATHY, CERVICAL REGION: ICD-10-CM

## 2023-09-11 DIAGNOSIS — Z79.891 CHRONIC USE OF OPIATE DRUG FOR THERAPEUTIC PURPOSE: ICD-10-CM

## 2023-09-11 PROCEDURE — 1159F MED LIST DOCD IN RCRD: CPT | Mod: CPTII,95,, | Performed by: FAMILY MEDICINE

## 2023-09-11 PROCEDURE — 99214 PR OFFICE/OUTPT VISIT, EST, LEVL IV, 30-39 MIN: ICD-10-PCS | Mod: 95,,, | Performed by: FAMILY MEDICINE

## 2023-09-11 PROCEDURE — 1159F PR MEDICATION LIST DOCUMENTED IN MEDICAL RECORD: ICD-10-PCS | Mod: CPTII,95,, | Performed by: FAMILY MEDICINE

## 2023-09-11 PROCEDURE — 1160F PR REVIEW ALL MEDS BY PRESCRIBER/CLIN PHARMACIST DOCUMENTED: ICD-10-PCS | Mod: CPTII,95,, | Performed by: FAMILY MEDICINE

## 2023-09-11 PROCEDURE — 1160F RVW MEDS BY RX/DR IN RCRD: CPT | Mod: CPTII,95,, | Performed by: FAMILY MEDICINE

## 2023-09-11 PROCEDURE — 3008F PR BODY MASS INDEX (BMI) DOCUMENTED: ICD-10-PCS | Mod: CPTII,95,, | Performed by: FAMILY MEDICINE

## 2023-09-11 PROCEDURE — 99214 OFFICE O/P EST MOD 30 MIN: CPT | Mod: 95,,, | Performed by: FAMILY MEDICINE

## 2023-09-11 PROCEDURE — 3008F BODY MASS INDEX DOCD: CPT | Mod: CPTII,95,, | Performed by: FAMILY MEDICINE

## 2023-09-11 RX ORDER — HYDROCODONE BITARTRATE AND ACETAMINOPHEN 7.5; 325 MG/1; MG/1
1 TABLET ORAL EVERY 12 HOURS PRN
Qty: 60 TABLET | Refills: 0 | Status: SHIPPED | OUTPATIENT
Start: 2023-09-11 | End: 2023-09-11 | Stop reason: SDUPTHER

## 2023-09-11 RX ORDER — HYDROCODONE BITARTRATE AND ACETAMINOPHEN 7.5; 325 MG/1; MG/1
1 TABLET ORAL EVERY 12 HOURS PRN
Qty: 60 TABLET | Refills: 0 | Status: SHIPPED | OUTPATIENT
Start: 2023-10-10 | End: 2023-10-17 | Stop reason: SDUPTHER

## 2023-09-11 NOTE — TELEPHONE ENCOUNTER
----- Message from Kelli Dhaliwal sent at 9/11/2023 10:39 AM CDT -----  Regarding: Pt advice  Contact: Pt  Pt spouse is requesting a callback regarding advice. She stated the pt eye is blood shot red. She also is requesting a questing regarding eye  drops and post op instructions. Please adv       Confirmed contact below:   Contact Name:Srinivasan Munoz  Phone Number: [phone]

## 2023-09-11 NOTE — PROGRESS NOTES
"Telemedicine Office Visit    Srinivasan Munoz    1960  [id number]    Subjective     The patient location is: home   The chief complaint leading to consultation is: pain medication   Visit type: Virtual visit with synchronous audio and video  Total time spent with patient: 15 minutes   Each patient to whom he or she provides medical services by telemedicine is:  (1) informed of the relationship between the physician and patient and the respective role of any other health care provider with respect to management of the patient; and (2) notified that he or she may decline to receive medical services by telemedicine and may withdraw from such care at any time.    Srinivasan is a 63 y.o. male who presents through telemedicine for:     Shoulder pain - chronic condition for patient - Patient has been evaluated by ortho and pain management. 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 He has severe pain. It limits his range of motion. Patient is currently on gabapentin and celebrex and continues to have severe pain. He takes hydrocodone for severe pain. He is requesting refill for medication. He does not abuse medication   Eye surgery - Patient had recent eye surgery for double vision. He reports some discomfort.     Objective     Review of Systems   Constitutional:  Negative for chills and fever.   HENT:  Negative for congestion.    Eyes:  Negative for blurred vision.   Respiratory:  Negative for cough.    Cardiovascular:  Negative for chest pain.   Gastrointestinal:  Negative for abdominal pain, constipation, diarrhea, heartburn, nausea and vomiting.   Genitourinary:  Negative for dysuria.   Musculoskeletal:  Negative for myalgias.   Skin:  Negative for itching and rash.   Neurological:  Negative for dizziness and headaches.   Psychiatric/Behavioral:  Negative for depression.        Pulse 62   Ht 6' 1" (1.854 m)   SpO2 98%   BMI 35.75 kg/m²   Physical " Exam  Constitutional:       General: He is not in acute distress.     Appearance: He is well-developed. He is not diaphoretic.   HENT:      Head: Normocephalic and atraumatic.      Right Ear: External ear normal.      Left Ear: External ear normal.      Nose: Nose normal.   Eyes:      Conjunctiva/sclera: Conjunctivae normal.   Pulmonary:      Effort: Pulmonary effort is normal.   Musculoskeletal:      Cervical back: Normal range of motion.   Skin:     Findings: No rash.   Neurological:      Mental Status: He is alert and oriented to person, place, and time.   Psychiatric:         Behavior: Behavior normal.         Thought Content: Thought content normal.         Judgment: Judgment normal.           Plan     Problem List Items Addressed This Visit          Neuro    Degenerative cervical disc - Primary    Overview     With radiculopathy  On gabapentin and muscle relaxer          Relevant Medications    HYDROcodone-acetaminophen (NORCO) 7.5-325 mg per tablet    Osteoarthritis of spine with radiculopathy, cervical region    Relevant Medications    HYDROcodone-acetaminophen (NORCO) 7.5-325 mg per tablet  Reviewed [place]   The current medical regimen is effective;  continue present plan and medications.          Orthopedic    Chronic left shoulder pain    Relevant Medications    HYDROcodone-acetaminophen (NORCO) 7.5-325 mg per tablet     Other Visit Diagnoses       Chronic use of opiate drug for therapeutic purpose        Relevant Medications    HYDROcodone-acetaminophen (NORCO) 7.5-325 mg per tablet 9/11/2023  HYDROcodone-acetaminophen (NORCO) 7.5-325 mg per tablet 10/10/2023              Follow up in about 6 months (around 3/11/2024), or if symptoms worsen or fail to improve.        Answers submitted by the patient for this visit:  Review of Systems Questionnaire (Submitted on 9/8/2023)  activity change: No  unexpected weight change: No  rhinorrhea: No  trouble swallowing: No  visual disturbance: No  chest tightness:  No  polyuria: No  difficulty urinating: No  joint swelling: Yes  arthralgias: Yes  confusion: No  dysphoric mood: No

## 2023-09-18 ENCOUNTER — TELEPHONE (OUTPATIENT)
Dept: OPHTHALMOLOGY | Facility: CLINIC | Age: 63
End: 2023-09-18
Payer: COMMERCIAL

## 2023-09-18 NOTE — TELEPHONE ENCOUNTER
----- Message from Bradley Beatty sent at 9/18/2023  8:57 AM CDT -----  Contact: [phone]  Pt wife is calling to see how long her  is supposed to take his drops for. Please call back to further assist.

## 2023-09-22 ENCOUNTER — OFFICE VISIT (OUTPATIENT)
Dept: OPHTHALMOLOGY | Facility: CLINIC | Age: 63
End: 2023-09-22
Payer: COMMERCIAL

## 2023-09-22 DIAGNOSIS — Z98.890 POST-OPERATIVE STATE: ICD-10-CM

## 2023-09-22 DIAGNOSIS — T85.22XA DISLOCATED IOL (INTRAOCULAR LENS), POSTERIOR: ICD-10-CM

## 2023-09-22 DIAGNOSIS — Z98.890 S/P LASIK (LASER ASSISTED IN SITU KERATOMILEUSIS): Primary | ICD-10-CM

## 2023-09-22 PROCEDURE — 1159F PR MEDICATION LIST DOCUMENTED IN MEDICAL RECORD: ICD-10-PCS | Mod: CPTII,S$GLB,, | Performed by: OPHTHALMOLOGY

## 2023-09-22 PROCEDURE — 99024 POSTOP FOLLOW-UP VISIT: CPT | Mod: S$GLB,,, | Performed by: OPHTHALMOLOGY

## 2023-09-22 PROCEDURE — 99999 PR PBB SHADOW E&M-EST. PATIENT-LVL III: ICD-10-PCS | Mod: PBBFAC,,, | Performed by: OPHTHALMOLOGY

## 2023-09-22 PROCEDURE — 1159F MED LIST DOCD IN RCRD: CPT | Mod: CPTII,S$GLB,, | Performed by: OPHTHALMOLOGY

## 2023-09-22 PROCEDURE — 99024 PR POST-OP FOLLOW-UP VISIT: ICD-10-PCS | Mod: S$GLB,,, | Performed by: OPHTHALMOLOGY

## 2023-09-22 PROCEDURE — 1160F RVW MEDS BY RX/DR IN RCRD: CPT | Mod: CPTII,S$GLB,, | Performed by: OPHTHALMOLOGY

## 2023-09-22 PROCEDURE — 1160F PR REVIEW ALL MEDS BY PRESCRIBER/CLIN PHARMACIST DOCUMENTED: ICD-10-PCS | Mod: CPTII,S$GLB,, | Performed by: OPHTHALMOLOGY

## 2023-09-22 PROCEDURE — 99999 PR PBB SHADOW E&M-EST. PATIENT-LVL III: CPT | Mod: PBBFAC,,, | Performed by: OPHTHALMOLOGY

## 2023-09-22 NOTE — PROGRESS NOTES
HPI    09/07/2023 IMPLANT: ZEISS CT EWA 24.0 OD    Patient is here today for 2 week phaco OD. Patient states there is   occasional glaring in OD. Patient states vision is good and no pain.     Ofloxacin TID OD  Prednisolone Acetate TID OD      Last edited by Kelli Alvarado on 9/22/2023  3:23 PM.            Assessment /Plan     For exam results, see Encounter Report.    S/P LASIK (laser assisted in situ keratomileusis)    Dislocated IOL (intraocular lens), posterior    Post-operative state      POW1 Yamane IOL OD  Excellent result   Routine gtts

## 2023-10-06 DIAGNOSIS — M54.12 CERVICAL RADICULOPATHY: ICD-10-CM

## 2023-10-06 RX ORDER — CELECOXIB 200 MG/1
200 CAPSULE ORAL 2 TIMES DAILY
Qty: 180 CAPSULE | Refills: 0 | Status: SHIPPED | OUTPATIENT
Start: 2023-10-06 | End: 2024-01-11

## 2023-10-06 NOTE — TELEPHONE ENCOUNTER
No care due was identified.  [facility] Embedded Care Due Messages. Reference number: [id number].   10/06/2023 9:24:26 AM CDT

## 2023-10-19 ENCOUNTER — TELEPHONE (OUTPATIENT)
Dept: FAMILY MEDICINE | Facility: CLINIC | Age: 63
End: 2023-10-19
Payer: COMMERCIAL

## 2023-10-19 DIAGNOSIS — M50.30 DEGENERATIVE CERVICAL DISC: ICD-10-CM

## 2023-10-19 DIAGNOSIS — M47.22 OSTEOARTHRITIS OF SPINE WITH RADICULOPATHY, CERVICAL REGION: Primary | ICD-10-CM

## 2023-10-19 DIAGNOSIS — Z79.891 CHRONIC USE OF OPIATE DRUG FOR THERAPEUTIC PURPOSE: ICD-10-CM

## 2023-10-19 RX ORDER — HYDROCODONE BITARTRATE AND ACETAMINOPHEN 5; 325 MG/1; MG/1
1 TABLET ORAL EVERY 8 HOURS PRN
Qty: 90 TABLET | Refills: 0 | Status: SHIPPED | OUTPATIENT
Start: 2023-10-19 | End: 2023-11-02 | Stop reason: ALTCHOICE

## 2023-10-19 NOTE — TELEPHONE ENCOUNTER
Chi, pharmacist, contacted regarding prescription in message below and patient diagnoses confirmed.

## 2023-10-19 NOTE — TELEPHONE ENCOUNTER
----- Message from Estefanía Marc sent at 10/19/2023  8:52 AM CDT -----  Regarding: Pharmacy Call Back  Type:  Pharmacy Calling to Clarify an RX    Name of Caller: Mohan     Pharmacy Name: [facility]     Prescription Name: HYDROcodone-acetaminophen (NORCO) 7.5-325 mg per tablet    What do they need to clarify? Needs documation on Rx    Can you be contacted via [facility]? No     Best Call Back Number: [phone]

## 2023-10-19 NOTE — TELEPHONE ENCOUNTER
Voicemail message left for patient to notify him that a new prescription was sent to the pharmacy because the previously sent prescription strength is on backorder.

## 2023-10-27 ENCOUNTER — LAB VISIT (OUTPATIENT)
Dept: LAB | Facility: HOSPITAL | Age: 63
End: 2023-10-27
Attending: FAMILY MEDICINE
Payer: COMMERCIAL

## 2023-10-27 DIAGNOSIS — E78.5 HYPERLIPIDEMIA, UNSPECIFIED HYPERLIPIDEMIA TYPE: ICD-10-CM

## 2023-10-27 DIAGNOSIS — Z00.00 GENERAL MEDICAL EXAM: ICD-10-CM

## 2023-10-27 DIAGNOSIS — R73.03 PREDIABETES: ICD-10-CM

## 2023-10-27 LAB
ALBUMIN SERPL BCP-MCNC: 3.9 G/DL (ref 3.5–5.2)
ALP SERPL-CCNC: 61 U/L (ref 55–135)
ALT SERPL W/O P-5'-P-CCNC: 20 U/L (ref 10–44)
ANION GAP SERPL CALC-SCNC: 10 MMOL/L (ref 8–16)
AST SERPL-CCNC: 19 U/L (ref 10–40)
BASOPHILS # BLD AUTO: 0.14 K/UL (ref 0–0.2)
BASOPHILS NFR BLD: 1.4 % (ref 0–1.9)
BILIRUB SERPL-MCNC: 0.8 MG/DL (ref 0.1–1)
BUN SERPL-MCNC: 10 MG/DL (ref 8–23)
CALCIUM SERPL-MCNC: 9.1 MG/DL (ref 8.7–10.5)
CHLORIDE SERPL-SCNC: 105 MMOL/L (ref 95–110)
CHOLEST SERPL-MCNC: 161 MG/DL (ref 120–199)
CHOLEST/HDLC SERPL: 3.6 {RATIO} (ref 2–5)
CO2 SERPL-SCNC: 25 MMOL/L (ref 23–29)
COMPLEXED PSA SERPL-MCNC: 0.17 NG/ML (ref 0–4)
CREAT SERPL-MCNC: 0.8 MG/DL (ref 0.5–1.4)
DIFFERENTIAL METHOD: ABNORMAL
EOSINOPHIL # BLD AUTO: 0.1 K/UL (ref 0–0.5)
EOSINOPHIL NFR BLD: 1 % (ref 0–8)
ERYTHROCYTE [DISTWIDTH] IN BLOOD BY AUTOMATED COUNT: 14.1 % (ref 11.5–14.5)
EST. GFR  (NO RACE VARIABLE): >60 ML/MIN/1.73 M^2
ESTIMATED AVG GLUCOSE: 108 MG/DL (ref 68–131)
GLUCOSE SERPL-MCNC: 102 MG/DL (ref 70–110)
HBA1C MFR BLD: 5.4 % (ref 4–5.6)
HCT VFR BLD AUTO: 44.6 % (ref 40–54)
HDLC SERPL-MCNC: 45 MG/DL (ref 40–75)
HDLC SERPL: 28 % (ref 20–50)
HGB BLD-MCNC: 14.5 G/DL (ref 14–18)
HIV 1+2 AB+HIV1 P24 AG SERPL QL IA: NORMAL
IMM GRANULOCYTES # BLD AUTO: 0.04 K/UL (ref 0–0.04)
IMM GRANULOCYTES NFR BLD AUTO: 0.4 % (ref 0–0.5)
LDLC SERPL CALC-MCNC: 100.6 MG/DL (ref 63–159)
LYMPHOCYTES # BLD AUTO: 2.7 K/UL (ref 1–4.8)
LYMPHOCYTES NFR BLD: 27.5 % (ref 18–48)
MCH RBC QN AUTO: 28.7 PG (ref 27–31)
MCHC RBC AUTO-ENTMCNC: 32.5 G/DL (ref 32–36)
MCV RBC AUTO: 88 FL (ref 82–98)
MONOCYTES # BLD AUTO: 1.1 K/UL (ref 0.3–1)
MONOCYTES NFR BLD: 11.4 % (ref 4–15)
NEUTROPHILS # BLD AUTO: 5.8 K/UL (ref 1.8–7.7)
NEUTROPHILS NFR BLD: 58.3 % (ref 38–73)
NONHDLC SERPL-MCNC: 116 MG/DL
NRBC BLD-RTO: 0 /100 WBC
PLATELET # BLD AUTO: 334 K/UL (ref 150–450)
PMV BLD AUTO: 9.9 FL (ref 9.2–12.9)
POTASSIUM SERPL-SCNC: 3.8 MMOL/L (ref 3.5–5.1)
PROT SERPL-MCNC: 6.3 G/DL (ref 6–8.4)
RBC # BLD AUTO: 5.06 M/UL (ref 4.6–6.2)
SODIUM SERPL-SCNC: 140 MMOL/L (ref 136–145)
TRIGL SERPL-MCNC: 77 MG/DL (ref 30–150)
TSH SERPL DL<=0.005 MIU/L-ACNC: 1.89 UIU/ML (ref 0.4–4)
WBC # BLD AUTO: 9.93 K/UL (ref 3.9–12.7)

## 2023-10-27 PROCEDURE — 84443 ASSAY THYROID STIM HORMONE: CPT | Performed by: FAMILY MEDICINE

## 2023-10-27 PROCEDURE — 84153 ASSAY OF PSA TOTAL: CPT | Performed by: FAMILY MEDICINE

## 2023-10-27 PROCEDURE — 36415 COLL VENOUS BLD VENIPUNCTURE: CPT | Mod: PO | Performed by: FAMILY MEDICINE

## 2023-10-27 PROCEDURE — 87389 HIV-1 AG W/HIV-1&-2 AB AG IA: CPT | Performed by: FAMILY MEDICINE

## 2023-10-27 PROCEDURE — 83036 HEMOGLOBIN GLYCOSYLATED A1C: CPT | Performed by: FAMILY MEDICINE

## 2023-10-27 PROCEDURE — 80061 LIPID PANEL: CPT | Performed by: FAMILY MEDICINE

## 2023-10-27 PROCEDURE — 80053 COMPREHEN METABOLIC PANEL: CPT | Performed by: FAMILY MEDICINE

## 2023-10-27 PROCEDURE — 85025 COMPLETE CBC W/AUTO DIFF WBC: CPT | Performed by: FAMILY MEDICINE

## 2023-11-02 ENCOUNTER — OFFICE VISIT (OUTPATIENT)
Dept: FAMILY MEDICINE | Facility: CLINIC | Age: 63
End: 2023-11-02
Payer: COMMERCIAL

## 2023-11-02 VITALS
DIASTOLIC BLOOD PRESSURE: 70 MMHG | SYSTOLIC BLOOD PRESSURE: 132 MMHG | OXYGEN SATURATION: 95 % | HEIGHT: 73 IN | HEART RATE: 61 BPM | WEIGHT: 283.31 LBS | TEMPERATURE: 98 F | BODY MASS INDEX: 37.55 KG/M2

## 2023-11-02 DIAGNOSIS — Z00.00 ANNUAL PHYSICAL EXAM: Primary | ICD-10-CM

## 2023-11-02 DIAGNOSIS — M47.22 OSTEOARTHRITIS OF SPINE WITH RADICULOPATHY, CERVICAL REGION: ICD-10-CM

## 2023-11-02 DIAGNOSIS — R73.03 PREDIABETES: ICD-10-CM

## 2023-11-02 DIAGNOSIS — E78.5 HYPERLIPIDEMIA, UNSPECIFIED HYPERLIPIDEMIA TYPE: ICD-10-CM

## 2023-11-02 DIAGNOSIS — Z79.891 CHRONIC USE OF OPIATE DRUG FOR THERAPEUTIC PURPOSE: ICD-10-CM

## 2023-11-02 DIAGNOSIS — Z23 NEED FOR PROPHYLACTIC VACCINATION AND INOCULATION AGAINST INFLUENZA: ICD-10-CM

## 2023-11-02 DIAGNOSIS — M25.512 CHRONIC LEFT SHOULDER PAIN: ICD-10-CM

## 2023-11-02 DIAGNOSIS — G89.29 CHRONIC LEFT SHOULDER PAIN: ICD-10-CM

## 2023-11-02 DIAGNOSIS — M50.30 DEGENERATIVE CERVICAL DISC: ICD-10-CM

## 2023-11-02 PROCEDURE — 1160F PR REVIEW ALL MEDS BY PRESCRIBER/CLIN PHARMACIST DOCUMENTED: ICD-10-PCS | Mod: CPTII,S$GLB,, | Performed by: FAMILY MEDICINE

## 2023-11-02 PROCEDURE — 3075F SYST BP GE 130 - 139MM HG: CPT | Mod: CPTII,S$GLB,, | Performed by: FAMILY MEDICINE

## 2023-11-02 PROCEDURE — 90686 IIV4 VACC NO PRSV 0.5 ML IM: CPT | Mod: S$GLB,,, | Performed by: FAMILY MEDICINE

## 2023-11-02 PROCEDURE — 99999 PR PBB SHADOW E&M-EST. PATIENT-LVL IV: CPT | Mod: PBBFAC,,, | Performed by: FAMILY MEDICINE

## 2023-11-02 PROCEDURE — 99999 PR PBB SHADOW E&M-EST. PATIENT-LVL IV: ICD-10-PCS | Mod: PBBFAC,,, | Performed by: FAMILY MEDICINE

## 2023-11-02 PROCEDURE — 1160F RVW MEDS BY RX/DR IN RCRD: CPT | Mod: CPTII,S$GLB,, | Performed by: FAMILY MEDICINE

## 2023-11-02 PROCEDURE — 1159F MED LIST DOCD IN RCRD: CPT | Mod: CPTII,S$GLB,, | Performed by: FAMILY MEDICINE

## 2023-11-02 PROCEDURE — 1159F PR MEDICATION LIST DOCUMENTED IN MEDICAL RECORD: ICD-10-PCS | Mod: CPTII,S$GLB,, | Performed by: FAMILY MEDICINE

## 2023-11-02 PROCEDURE — 3008F BODY MASS INDEX DOCD: CPT | Mod: CPTII,S$GLB,, | Performed by: FAMILY MEDICINE

## 2023-11-02 PROCEDURE — 3078F DIAST BP <80 MM HG: CPT | Mod: CPTII,S$GLB,, | Performed by: FAMILY MEDICINE

## 2023-11-02 PROCEDURE — 99396 PREV VISIT EST AGE 40-64: CPT | Mod: 25,S$GLB,, | Performed by: FAMILY MEDICINE

## 2023-11-02 PROCEDURE — 3044F HG A1C LEVEL LT 7.0%: CPT | Mod: CPTII,S$GLB,, | Performed by: FAMILY MEDICINE

## 2023-11-02 PROCEDURE — 90471 FLU VACCINE (QUAD) GREATER THAN OR EQUAL TO 3YO PRESERVATIVE FREE IM: ICD-10-PCS | Mod: S$GLB,,, | Performed by: FAMILY MEDICINE

## 2023-11-02 PROCEDURE — 90471 IMMUNIZATION ADMIN: CPT | Mod: S$GLB,,, | Performed by: FAMILY MEDICINE

## 2023-11-02 PROCEDURE — 3075F PR MOST RECENT SYSTOLIC BLOOD PRESS GE 130-139MM HG: ICD-10-PCS | Mod: CPTII,S$GLB,, | Performed by: FAMILY MEDICINE

## 2023-11-02 PROCEDURE — 99396 PR PREVENTIVE VISIT,EST,40-64: ICD-10-PCS | Mod: 25,S$GLB,, | Performed by: FAMILY MEDICINE

## 2023-11-02 PROCEDURE — 3078F PR MOST RECENT DIASTOLIC BLOOD PRESSURE < 80 MM HG: ICD-10-PCS | Mod: CPTII,S$GLB,, | Performed by: FAMILY MEDICINE

## 2023-11-02 PROCEDURE — 3008F PR BODY MASS INDEX (BMI) DOCUMENTED: ICD-10-PCS | Mod: CPTII,S$GLB,, | Performed by: FAMILY MEDICINE

## 2023-11-02 PROCEDURE — 90686 FLU VACCINE (QUAD) GREATER THAN OR EQUAL TO 3YO PRESERVATIVE FREE IM: ICD-10-PCS | Mod: S$GLB,,, | Performed by: FAMILY MEDICINE

## 2023-11-02 PROCEDURE — 3044F PR MOST RECENT HEMOGLOBIN A1C LEVEL <7.0%: ICD-10-PCS | Mod: CPTII,S$GLB,, | Performed by: FAMILY MEDICINE

## 2023-11-02 RX ORDER — HYDROCODONE BITARTRATE AND ACETAMINOPHEN 7.5; 325 MG/1; MG/1
1 TABLET ORAL EVERY 12 HOURS PRN
Qty: 60 TABLET | Refills: 0 | Status: SHIPPED | OUTPATIENT
Start: 2023-11-18 | End: 2023-12-17

## 2023-11-02 RX ORDER — HYDROCODONE BITARTRATE AND ACETAMINOPHEN 7.5; 325 MG/1; MG/1
1 TABLET ORAL EVERY 12 HOURS PRN
Qty: 60 TABLET | Refills: 0 | Status: SHIPPED | OUTPATIENT
Start: 2024-01-15 | End: 2024-01-29 | Stop reason: SDUPTHER

## 2023-11-02 RX ORDER — HYDROCODONE BITARTRATE AND ACETAMINOPHEN 7.5; 325 MG/1; MG/1
1 TABLET ORAL EVERY 12 HOURS PRN
Qty: 60 TABLET | Refills: 0 | Status: SHIPPED | OUTPATIENT
Start: 2023-02-13 | End: 2023-03-14

## 2023-11-02 RX ORDER — HYDROCODONE BITARTRATE AND ACETAMINOPHEN 7.5; 325 MG/1; MG/1
1 TABLET ORAL EVERY 12 HOURS PRN
Qty: 60 TABLET | Refills: 0 | Status: SHIPPED | OUTPATIENT
Start: 2023-12-17 | End: 2024-01-15

## 2023-11-02 NOTE — PROGRESS NOTES
"Routine Office Visit    Srinivasan Munoz  1960  [id number]      Subjective     Srinivasan is a 63 y.o. male who presents today for:    Annual physical   Back and Shoulder pain - chronic condition for patient - Patient has been evaluated by ortho and pain management. 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 His job is physical and outdoors. He is currently working on [employer]. He has severe pain. It limits his range of motion. Patient is currently on gabapentin and celebrex and continues to have severe pain. He is stable on current hydrocodone use.     Objective     Review of Systems   Constitutional:  Negative for chills and fever.   HENT:  Negative for congestion.    Eyes:  Negative for blurred vision.   Respiratory:  Negative for cough.    Cardiovascular:  Negative for chest pain.   Gastrointestinal:  Negative for abdominal pain, constipation, diarrhea, heartburn, nausea and vomiting.   Genitourinary:  Negative for dysuria.   Musculoskeletal:  Negative for myalgias.   Skin:  Negative for itching and rash.   Neurological:  Negative for dizziness and headaches.   Psychiatric/Behavioral:  Negative for depression.      /70   Pulse 61   Temp 98.2 °F (36.8 °C)   Ht 6' 1" (1.854 m)   Wt 128.5 kg (283 lb 4.7 oz)   SpO2 95%   BMI 37.38 kg/m²   Physical Exam  Constitutional:       Appearance: He is well-developed.   HENT:      Head: Normocephalic and atraumatic.   Eyes:      Conjunctiva/sclera: Conjunctivae normal.      Pupils: Pupils are equal, round, and reactive to light.   Neck:      Thyroid: No thyromegaly.      Vascular: No JVD.   Cardiovascular:      Rate and Rhythm: Normal rate and regular rhythm.      Heart sounds: Normal heart sounds.   Pulmonary:      Effort: Pulmonary effort is normal.      Breath sounds: Normal breath sounds. No wheezing.   Abdominal:      General: Bowel sounds are normal. There is no distension.      Palpations: " Abdomen is soft.      Tenderness: There is no abdominal tenderness. There is no guarding.   Musculoskeletal:         General: Normal range of motion.      Cervical back: Normal range of motion and neck supple.   Lymphadenopathy:      Cervical: No cervical adenopathy.   Skin:     General: Skin is warm and dry.   Neurological:      Mental Status: He is alert and oriented to person, place, and time.   Psychiatric:         Behavior: Behavior normal.             Assessment     Health Maintenance         Date Due Completion Date    Colorectal Cancer Screening Never done ---    LDCT Lung Screen Never done ---    RSV Vaccine (Age 60+) (1 - 1-dose 60+ series) Never done ---    COVID-19 Vaccine (7 - 2023-24 season) 09/01/2023 2/25/2022    PROSTATE-SPECIFIC ANTIGEN 10/27/2024 10/27/2023    Hemoglobin A1c (Prediabetes) 10/27/2024 10/27/2023    TETANUS VACCINE 10/24/2027 10/24/2017    Lipid Panel 10/27/2028 10/27/2023              Problem List Items Addressed This Visit          Neuro    Degenerative cervical disc    Overview     With radiculopathy  On gabapentin and celebrex   Hydrocodone for break through pain   Patient has chronic pain involving the back and shoulder.  History of trauma? No.  Onset: Approximately few years ago.  Frequency: constant.  Progression since onset: unchanged.  Pain quality: 5/10.  Current treatment: Lortab ( Hydrocodone/Acetaminophen) and Norco, celebrex, gabapentin   Improvement on current treatment: moderate  Treatments tried:  OTC acetaminophen, OTC analgesics, OTC ibuprofen, and Norco, ; Therapy: Physical Therapy; Injections: None - with mild improvement  Associated symptoms: no other symptoms  Previous imaging: X-ray, PET scan, and None   Substance abuse history:  None  Social History: None           Relevant Medications    HYDROcodone-acetaminophen (NORCO) 7.5-325 mg per tablet (Start on 11/18/2023)    HYDROcodone-acetaminophen (NORCO) 7.5-325 mg per tablet (Start on 12/17/2023)     HYDROcodone-acetaminophen (NORCO) 7.5-325 mg per tablet (Start on 1/15/2024)  Reviewed [place]   The current medical regimen is effective;  continue present plan and medications.       Osteoarthritis of spine with radiculopathy, cervical region    Relevant Medications    HYDROcodone-acetaminophen (NORCO) 7.5-325 mg per tablet (Start on 11/18/2023)    HYDROcodone-acetaminophen (NORCO) 7.5-325 mg per tablet (Start on 12/17/2023)    HYDROcodone-acetaminophen (NORCO) 7.5-325 mg per tablet (Start on 1/15/2024)       Orthopedic    Chronic left shoulder pain    Relevant Medications    HYDROcodone-acetaminophen (NORCO) 7.5-325 mg per tablet (Start on 11/18/2023)    HYDROcodone-acetaminophen (NORCO) 7.5-325 mg per tablet (Start on 12/17/2023)    HYDROcodone-acetaminophen (NORCO) 7.5-325 mg per tablet (Start on 1/15/2024)     Other Visit Diagnoses       Annual physical exam    -  Primary  I addressed all major concerns as it related to health maintenance.  All were ordered and scheduled based on the patients wishes.  Any additional health maintenance will be readdressed at the next physical if declined or deferred by the patient.       Chronic use of opiate drug for therapeutic purpose        Relevant Medications    HYDROcodone-acetaminophen (NORCO) 7.5-325 mg per tablet (Start on 11/18/2023)    HYDROcodone-acetaminophen (NORCO) 7.5-325 mg per tablet (Start on 12/17/2023)    HYDROcodone-acetaminophen (NORCO) 7.5-325 mg per tablet (Start on 1/15/2024)    Prediabetes      The patient is asked to make an attempt to improve diet and exercise patterns to aid in medical management of this problem.       Hyperlipidemia, unspecified hyperlipidemia type      Improved       Need for prophylactic vaccination and inoculation against influenza        Relevant Orders    Influenza - Quadrivalent *Preferred* (6 months+) (PF) (Completed)                  Follow up in about 4 months (around 3/2/2024), or if symptoms worsen or fail to  improve.

## 2024-01-04 ENCOUNTER — PATIENT MESSAGE (OUTPATIENT)
Dept: ADMINISTRATIVE | Facility: HOSPITAL | Age: 64
End: 2024-01-04
Payer: COMMERCIAL

## 2024-01-25 ENCOUNTER — PATIENT MESSAGE (OUTPATIENT)
Dept: ADMINISTRATIVE | Facility: HOSPITAL | Age: 64
End: 2024-01-25
Payer: COMMERCIAL

## 2024-01-29 ENCOUNTER — TELEPHONE (OUTPATIENT)
Dept: FAMILY MEDICINE | Facility: CLINIC | Age: 64
End: 2024-01-29
Payer: COMMERCIAL

## 2024-01-29 DIAGNOSIS — M47.22 OSTEOARTHRITIS OF SPINE WITH RADICULOPATHY, CERVICAL REGION: ICD-10-CM

## 2024-01-29 DIAGNOSIS — M25.512 CHRONIC LEFT SHOULDER PAIN: ICD-10-CM

## 2024-01-29 DIAGNOSIS — Z79.891 CHRONIC USE OF OPIATE DRUG FOR THERAPEUTIC PURPOSE: ICD-10-CM

## 2024-01-29 DIAGNOSIS — G89.29 CHRONIC LEFT SHOULDER PAIN: ICD-10-CM

## 2024-01-29 DIAGNOSIS — M50.30 DEGENERATIVE CERVICAL DISC: ICD-10-CM

## 2024-01-29 RX ORDER — HYDROCODONE BITARTRATE AND ACETAMINOPHEN 7.5; 325 MG/1; MG/1
1 TABLET ORAL EVERY 12 HOURS PRN
Qty: 60 TABLET | Refills: 0 | Status: SHIPPED | OUTPATIENT
Start: 2024-01-29 | End: 2024-02-29 | Stop reason: SDUPTHER

## 2024-01-29 NOTE — TELEPHONE ENCOUNTER
Spoke with patient informed that old prescription will need to be returned , patient verbalized understanding stated he will have his wife to  prescription from [facility]  and bring to clinic.

## 2024-01-29 NOTE — TELEPHONE ENCOUNTER
----- Message from Chon Declan sent at 1/29/2024 10:48 AM CST -----  Regarding: self  Type: Patient Call Back    Who called:self    What is the request in detail:pt is saying that [facility] needed this office to send them another script of the HYDROcodone-acetaminophen (NORCO) 7.5-325 mg per tablet because they can only do the 7 day supply this month     Can the clinic reply by [facility]?no    Would the patient rather a call back or a response via [facility]? callback    Best call back number:[phone]    Additional Information:

## 2024-01-29 NOTE — TELEPHONE ENCOUNTER
Spoke with patient stated he needs new prescription for January for his Norco 7.5-325 mg , stated he normally fills script by the 17th of each month. He stated he went to pharmacy today to fill January because he doesn't take medication everyday. Patient stated [facility]  refused to fill prescription stated he was told he needs new prescription. Please advise thank you.

## 2024-01-30 ENCOUNTER — TELEPHONE (OUTPATIENT)
Dept: FAMILY MEDICINE | Facility: CLINIC | Age: 64
End: 2024-01-30
Payer: COMMERCIAL

## 2024-01-30 NOTE — TELEPHONE ENCOUNTER
----- Message from Marcie Casitllo sent at 1/30/2024  9:12 AM CST -----  Type:  Pharmacy Calling to Clarify an RX    Name of Caller: Natalio    Pharmacy Name: .  [facility] [place] [place] - [address]  [address]  [place] [address]  Phone: [phone] Fax: [phone]        Prescription Name:HYDROcodone-acetaminophen (NORCO) 7.5-325 mg per table    What do they need to clarify? Requesting a prior authorization     Can you be contacted via [facility]? Call Back     Best Call Back Number: [phone]    Additional Information:

## 2024-01-30 NOTE — TELEPHONE ENCOUNTER
----- Message from Pérez Espinoza sent at 1/30/2024  9:34 AM CST -----  Type: Patient Call Back    Who called:Self    What is the request in detail:Prior authorization for HYDROcodone-acetaminophen (NORCO) 7.5-325 mg per tablet    Can the clinic reply by [facility]?No    Would the patient rather a call back or a response via [facility]? Call    Best call back number:[phone]     Additional Information:

## 2024-01-30 NOTE — TELEPHONE ENCOUNTER
PA done on yesterday and this am again , both pending approval per payer . Please be advised thank you.

## 2024-01-30 NOTE — TELEPHONE ENCOUNTER
Prior authorization done on yesterday 01-29-24, pending approval by payer, please be advised thank you.

## 2024-01-31 ENCOUNTER — TELEPHONE (OUTPATIENT)
Dept: FAMILY MEDICINE | Facility: CLINIC | Age: 64
End: 2024-01-31
Payer: COMMERCIAL

## 2024-01-31 NOTE — TELEPHONE ENCOUNTER
----- Message from Sebastian Baldwin sent at 1/31/2024 10:11 AM CST -----  Regarding: Wife [phone]  Type: Patient Call Back    Who called: Wife     What is the request in detail: called in regards to find out what's happening with the prior auth for the medication  HYDROcodone-acetaminophen (NORCO) 7.5-325 mg per tablet    Can the clinic reply by [facility]? No     Would the patient rather a call back or a response via [facility]? Call back     Best call back number: [phone]    Additional Information:    Thank you.

## 2024-01-31 NOTE — TELEPHONE ENCOUNTER
Spoke with patient's wife Tata Munoz informed that prior authorization was denied, Mrs Munoz stated she was going to call insurance carrier at this time to fine out why. No further assistance was requested presently.

## 2024-02-02 ENCOUNTER — TELEPHONE (OUTPATIENT)
Dept: FAMILY MEDICINE | Facility: CLINIC | Age: 64
End: 2024-02-02
Payer: COMMERCIAL

## 2024-02-02 NOTE — TELEPHONE ENCOUNTER
----- Message from Marcie Castillo sent at 2/2/2024 11:09 AM CST -----  .Type: Patient Call Back    Who called:Tata - wife     What is the request in detail: Would like to know can she come in to get a [facility] card     Can the clinic reply by [facility]? No     Would the patient rather a call back or a response via [facility]? Call Back     Best call back number:[phone]    Additional Information:

## 2024-02-02 NOTE — TELEPHONE ENCOUNTER
Spoke to patient in reference to him getting a RX Card. His wife already came to the clinic to  the card.

## 2024-02-02 NOTE — TELEPHONE ENCOUNTER
Spoke with patient.  Encouraged him to use [facility] to get a lower price until he sees Dr. Marques.  He has to discuss with her about a change in the medication due to his pain contract.

## 2024-02-02 NOTE — TELEPHONE ENCOUNTER
----- Message from Zaynab Vallejo sent at 2/2/2024  3:29 PM CST -----  Type: Patient Call Back    Who called:pt     What is the request in detail:pt calling to speak to nurse in regards to hydrocodone. He is saying his prescription is not covered under [facility] but it is not covered under [facility]. Call pt     Can the clinic reply by [facility]?    Would the patient rather a call back or a response via [facility]? call    Best call back number:[phone]       Additional Information:

## 2024-02-02 NOTE — TELEPHONE ENCOUNTER
----- Message from Marcie Castillo sent at 2/2/2024 10:24 AM CST -----  .Type: Patient Call Back    Who called: Tata - wife     What is the request in detail: Stated medication was denied by insurance company  is there an alternate     Can the clinic reply by [facility]? No     Would the patient rather a call back or a response via [facility]? Call Back     Best call back number:[phone]    Additional Information:

## 2024-02-02 NOTE — TELEPHONE ENCOUNTER
Spoke with patient informed  [facility] card will be left at  stated he will pick card up on Monday .

## 2024-02-02 NOTE — TELEPHONE ENCOUNTER
Pt states he need a RX card and states he last received one from the provider, asking if there is one he can get

## 2024-02-06 ENCOUNTER — PATIENT MESSAGE (OUTPATIENT)
Dept: ADMINISTRATIVE | Facility: HOSPITAL | Age: 64
End: 2024-02-06
Payer: COMMERCIAL

## 2024-02-07 DIAGNOSIS — Z12.11 SCREENING FOR COLON CANCER: ICD-10-CM

## 2024-02-08 ENCOUNTER — TELEPHONE (OUTPATIENT)
Dept: FAMILY MEDICINE | Facility: CLINIC | Age: 64
End: 2024-02-08
Payer: COMMERCIAL

## 2024-02-08 NOTE — TELEPHONE ENCOUNTER
----- Message from Jonna Bhakta MA sent at 2/8/2024  9:46 AM CST -----  Type: Patient Call Back    Who called:[facility]     What is the request in detail: states they have been trying to reach the office regarding a prior auth for the pt's. Medication..     HYDROcodone-acetaminophen (NORCO) 7.5-325 mg per tablet    Can the clinic reply by [facility]?No    Would the patient rather a call back or a response via [facility]? Yes, call     Best call back number : [phone]

## 2024-02-08 NOTE — TELEPHONE ENCOUNTER
----- Message from Jonna Bhakta MA sent at 2/8/2024  9:46 AM CST -----  Type: Patient Call Back    Who called:[facility]     What is the request in detail: states they have been trying to reach the office regarding a prior auth for the pt's. Medication..     HYDROcodone-acetaminophen (NORCO) 7.5-325 mg per tablet    Can the clinic reply by [facility]?No    Would the patient rather a call back or a response via [facility]? Yes, call     Best call back number : [phone]

## 2024-02-12 ENCOUNTER — TELEPHONE (OUTPATIENT)
Dept: FAMILY MEDICINE | Facility: CLINIC | Age: 64
End: 2024-02-12
Payer: COMMERCIAL

## 2024-02-12 NOTE — TELEPHONE ENCOUNTER
----- Message from Kelli Olivares sent at 2/12/2024  9:38 AM CST -----  Regarding: Refill request  .Type: RX Refill Request    Who Called:self     Have you contacted your pharmacy:yes     Refill or New Rx: Refill    RX Name and Strength:HYDROcodone-acetaminophen (NORCO) 7.5-325 mg per tablet    Preferred Pharmacy with phone number:..  [facility] [address] [place] [address] - [address]  [address] [place] [address]  Phone: [phone] Fax: [phone]    Local or Mail Order:local     Ordering Provider:MITZI Marques    Would the patient rather a call back or a response via [facility]? Call     Best Call Back Number:.[phone]      Additional Information: Pharmacy states they can't refill medication with out a prior authorization from provider. Patient would like a call back from Dr. Marques

## 2024-02-19 NOTE — PROGRESS NOTES
History of Present Illness   Srinivasan Munoz is a 58 y.o. man with medical history as listed below who presents today for evaluation of left elbow pain. He reports symptoms have been present for >6 months. He reports that the pain radiates up his arm to his shoulder. He also has associated shooting pain and tingling that extends down the forearm. Pain is associated with cramping and inability to  or lift heavy objects. He is taking naproxen, flexeril, hydrocodone, and gabapentin with minimal relief. He denies known injury or trauma. He has no additional complaints and is otherwise healthy on today's visit.      Past Medical History:   Diagnosis Date    Arthritis     Eye injury 20 yrs ago    nail stuck in od     Fractured bone     right foot    Retinal detachment 20 yrs ago     od        Past Surgical History:   Procedure Laterality Date    CATARACT EXTRACTION Right     EYE SURGERY      REFRACTIVE SURGERY Bilateral 18 yrs    RETINAL DETACHMENT SURGERY Right 20 yrs ago       Social History     Social History    Marital status:      Spouse name: N/A    Number of children: N/A    Years of education: N/A     Social History Main Topics    Smoking status: Current Every Day Smoker     Packs/day: 2.00     Types: Cigarettes    Smokeless tobacco: Never Used    Alcohol use No    Drug use: No    Sexual activity: Yes     Partners: Female     Other Topics Concern    None     Social History Narrative    None       Family History   Problem Relation Age of Onset    Heart disease Father     Hypertension Father     Diabetes Mother     Stroke Mother     Cancer Mother     Hypertension Mother     Stroke Brother     Diabetes Brother     No Known Problems Sister     No Known Problems Maternal Aunt     No Known Problems Maternal Uncle     No Known Problems Paternal Aunt     No Known Problems Paternal Uncle     No Known Problems Maternal Grandmother     No Known Problems Maternal Grandfather      "No Known Problems Paternal Grandmother     No Known Problems Paternal Grandfather     Amblyopia Neg Hx     Blindness Neg Hx     Cataracts Neg Hx     Glaucoma Neg Hx     Macular degeneration Neg Hx     Retinal detachment Neg Hx     Strabismus Neg Hx     Thyroid disease Neg Hx        Review of Systems  Review of Systems   Constitutional: Negative for fever.   Cardiovascular: Negative for chest pain.   Musculoskeletal: Positive for joint pain and neck pain. Negative for back pain and falls.   Skin: Negative for itching and rash.   Neurological: Positive for tingling. Negative for sensory change and focal weakness.     A complete review of systems was otherwise negative.    Physical Exam  /72 (BP Location: Right arm, Patient Position: Sitting, BP Method: X-Large (Manual))   Pulse 68   Temp 97.8 °F (36.6 °C) (Oral)   Ht 6' 1" (1.854 m)   Wt (!) 140.6 kg (310 lb)   SpO2 97%   BMI 40.90 kg/m²   General appearance: alert, appears stated age, cooperative and no distress  Lungs: clear to auscultation bilaterally  Heart: regular rate and rhythm, S1, S2 normal, no murmur, click, rub or gallop  Extremities: extremities normal, atraumatic, no cyanosis or edema  Pulses: 2+ and symmetric  Skin: Skin color, texture, turgor normal. No rashes or lesions  Neurologic: Grossly normal  Musculoskeletal: left arm exhibits tenderness over insertion of distal and proximal bicep tendon with decreased  strength and pain of shoulder with elevation and internal rotation. Distal sensation intact. No swelling, crepitus, or obvious deformity. No redness or warmth over joint space.    Assessment/Plan  Biceps tendonitis, left  Trial of oral prednisone.  Continue pain medication and muscle relaxant as prescribed.  Ice to the area.  Try the stretches.  If no improvement, consider physical therapy and follow-up with orthopedics for further management.  ER precautions discussed.  -     predniSONE (DELTASONE) 20 MG tablet; Take 1 " tablet (20 mg total) by mouth once daily.  Dispense: 10 tablet; Refill: 0  -     Ambulatory referral to Orthopedics    Tendinopathy of left rotator cuff  As above.  -     predniSONE (DELTASONE) 20 MG tablet; Take 1 tablet (20 mg total) by mouth once daily.  Dispense: 10 tablet; Refill: 0  -     Ambulatory referral to Orthopedics    Degenerative cervical disc  The current medical regimen is effective;  continue present plan and medications.  -     Ambulatory referral to Orthopedics    He has verbalized understanding and is in agreement with plan of care.    Follow-up if symptoms worsen or fail to improve.   no concerns

## 2024-02-29 ENCOUNTER — OFFICE VISIT (OUTPATIENT)
Dept: FAMILY MEDICINE | Facility: CLINIC | Age: 64
End: 2024-02-29
Payer: COMMERCIAL

## 2024-02-29 ENCOUNTER — TELEPHONE (OUTPATIENT)
Dept: FAMILY MEDICINE | Facility: CLINIC | Age: 64
End: 2024-02-29

## 2024-02-29 VITALS
WEIGHT: 296.31 LBS | DIASTOLIC BLOOD PRESSURE: 74 MMHG | TEMPERATURE: 98 F | HEART RATE: 77 BPM | SYSTOLIC BLOOD PRESSURE: 130 MMHG | BODY MASS INDEX: 39.27 KG/M2 | HEIGHT: 73 IN | OXYGEN SATURATION: 97 %

## 2024-02-29 DIAGNOSIS — M47.22 OSTEOARTHRITIS OF SPINE WITH RADICULOPATHY, CERVICAL REGION: ICD-10-CM

## 2024-02-29 DIAGNOSIS — M25.512 CHRONIC LEFT SHOULDER PAIN: ICD-10-CM

## 2024-02-29 DIAGNOSIS — E66.01 SEVERE OBESITY (BMI 35.0-39.9) WITH COMORBIDITY: ICD-10-CM

## 2024-02-29 DIAGNOSIS — R73.03 PREDIABETES: ICD-10-CM

## 2024-02-29 DIAGNOSIS — Z79.891 CHRONIC USE OF OPIATE DRUG FOR THERAPEUTIC PURPOSE: ICD-10-CM

## 2024-02-29 DIAGNOSIS — M50.30 DEGENERATIVE CERVICAL DISC: ICD-10-CM

## 2024-02-29 DIAGNOSIS — G89.29 CHRONIC LEFT SHOULDER PAIN: ICD-10-CM

## 2024-02-29 DIAGNOSIS — E78.5 HYPERLIPIDEMIA, UNSPECIFIED HYPERLIPIDEMIA TYPE: Primary | ICD-10-CM

## 2024-02-29 PROCEDURE — 3075F SYST BP GE 130 - 139MM HG: CPT | Mod: CPTII,S$GLB,, | Performed by: FAMILY MEDICINE

## 2024-02-29 PROCEDURE — 99214 OFFICE O/P EST MOD 30 MIN: CPT | Mod: S$GLB,,, | Performed by: FAMILY MEDICINE

## 2024-02-29 PROCEDURE — 3078F DIAST BP <80 MM HG: CPT | Mod: CPTII,S$GLB,, | Performed by: FAMILY MEDICINE

## 2024-02-29 PROCEDURE — 1160F RVW MEDS BY RX/DR IN RCRD: CPT | Mod: CPTII,S$GLB,, | Performed by: FAMILY MEDICINE

## 2024-02-29 PROCEDURE — 3008F BODY MASS INDEX DOCD: CPT | Mod: CPTII,S$GLB,, | Performed by: FAMILY MEDICINE

## 2024-02-29 PROCEDURE — 1159F MED LIST DOCD IN RCRD: CPT | Mod: CPTII,S$GLB,, | Performed by: FAMILY MEDICINE

## 2024-02-29 PROCEDURE — 99999 PR PBB SHADOW E&M-EST. PATIENT-LVL IV: CPT | Mod: PBBFAC,,, | Performed by: FAMILY MEDICINE

## 2024-02-29 RX ORDER — HYDROCODONE BITARTRATE AND ACETAMINOPHEN 7.5; 325 MG/1; MG/1
1 TABLET ORAL EVERY 12 HOURS PRN
Qty: 60 TABLET | Refills: 0 | Status: SHIPPED | OUTPATIENT
Start: 2024-02-29 | End: 2024-02-29 | Stop reason: SDUPTHER

## 2024-02-29 RX ORDER — HYDROCODONE BITARTRATE AND ACETAMINOPHEN 7.5; 325 MG/1; MG/1
1 TABLET ORAL EVERY 12 HOURS PRN
Qty: 60 TABLET | Refills: 0 | Status: SHIPPED | OUTPATIENT
Start: 2024-02-29 | End: 2024-03-30

## 2024-02-29 RX ORDER — HYDROCODONE BITARTRATE AND ACETAMINOPHEN 7.5; 325 MG/1; MG/1
1 TABLET ORAL EVERY 12 HOURS PRN
Qty: 60 TABLET | Refills: 0 | Status: SHIPPED | OUTPATIENT
Start: 2024-04-27 | End: 2024-06-08 | Stop reason: SDUPTHER

## 2024-02-29 RX ORDER — HYDROCODONE BITARTRATE AND ACETAMINOPHEN 7.5; 325 MG/1; MG/1
1 TABLET ORAL EVERY 12 HOURS PRN
Qty: 60 TABLET | Refills: 0 | Status: SHIPPED | OUTPATIENT
Start: 2024-03-29 | End: 2024-02-29 | Stop reason: SDUPTHER

## 2024-02-29 RX ORDER — HYDROCODONE BITARTRATE AND ACETAMINOPHEN 7.5; 325 MG/1; MG/1
1 TABLET ORAL EVERY 12 HOURS PRN
Qty: 60 TABLET | Refills: 0 | Status: SHIPPED | OUTPATIENT
Start: 2024-03-29 | End: 2024-05-01

## 2024-02-29 RX ORDER — HYDROCODONE BITARTRATE AND ACETAMINOPHEN 7.5; 325 MG/1; MG/1
1 TABLET ORAL EVERY 12 HOURS PRN
Qty: 60 TABLET | Refills: 0 | Status: SHIPPED | OUTPATIENT
Start: 2024-04-27 | End: 2024-02-29 | Stop reason: SDUPTHER

## 2024-02-29 NOTE — PROGRESS NOTES
"Routine Office Visit    Srinivasan Munoz  1960  [id number]      Subjective     Srinivasan is a 64 y.o. male who presents today for:    Shoulder pain, back pain and ankle pain - chronic condition for patient - Patient has been evaluated by ortho and pain management. He states pain started approximately 15 years ago after he fell off a crane at work. He fractured his ankle in multiple places. 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 He now has severe pain. It limits his range of motion. Patient is currently on gabapentin and celebrex and continues to have severe pain. He takes hydrocodone for severe pain. He is requesting refill for medication. He does not abuse medication. Medication helps with to continue to work   Weight - Patient has been struggling with his weight. He is interested in weight management     Objective     Review of Systems   Constitutional:  Negative for chills and fever.   HENT:  Negative for congestion.    Eyes:  Negative for blurred vision.   Respiratory:  Negative for cough.    Cardiovascular:  Negative for chest pain.   Gastrointestinal:  Negative for abdominal pain, constipation, diarrhea, heartburn, nausea and vomiting.   Genitourinary:  Negative for dysuria.   Musculoskeletal:  Negative for myalgias.   Skin:  Negative for itching and rash.   Neurological:  Negative for dizziness and headaches.   Psychiatric/Behavioral:  Negative for depression.      /74   Pulse 77   Temp 98.2 °F (36.8 °C)   Ht 6' 1" (1.854 m)   Wt 134.4 kg (296 lb 4.8 oz)   SpO2 97%   BMI 39.09 kg/m²   Physical Exam  Constitutional:       Appearance: Normal appearance. He is obese.   HENT:      Head: Normocephalic and atraumatic.      Nose: Nose normal.   Eyes:      Extraocular Movements: Extraocular movements intact.   Cardiovascular:      Rate and Rhythm: Normal rate and regular rhythm.      Pulses: Normal pulses.      Heart sounds: Normal heart " sounds.   Pulmonary:      Effort: Pulmonary effort is normal. No respiratory distress.      Breath sounds: Normal breath sounds.   Abdominal:      General: There is no distension.      Palpations: Abdomen is soft.      Tenderness: There is no abdominal tenderness.   Musculoskeletal:         General: Normal range of motion.      Cervical back: Normal range of motion.   Skin:     General: Skin is warm.   Neurological:      Mental Status: He is alert and oriented to person, place, and time.   Psychiatric:         Mood and Affect: Mood normal.             Assessment     Health Maintenance         Date Due Completion Date    Sign Pain Contract Never done ---    Complete Opioid Risk Tool Never done ---    Colorectal Cancer Screening Never done ---    LDCT Lung Screen Never done ---    RSV Vaccine (Age 60+ and Pregnant patients) (1 - 1-dose 60+ series) Never done ---    COVID-19 Vaccine (7 - 2023-24 season) 09/01/2023 2/25/2022    PROSTATE-SPECIFIC ANTIGEN 10/27/2024 10/27/2023    Hemoglobin A1c (Prediabetes) 10/27/2024 10/27/2023    TETANUS VACCINE 10/24/2027 10/24/2017    Lipid Panel 10/27/2028 10/27/2023              Problem List Items Addressed This Visit          Neuro    Degenerative cervical disc    Overview     With radiculopathy  Pt previously on gabapentin; would like to restart         Relevant Medications    HYDROcodone-acetaminophen (NORCO) 7.5-325 mg per tablet    HYDROcodone-acetaminophen (NORCO) 7.5-325 mg per tablet (Start on 3/29/2024)    HYDROcodone-acetaminophen (NORCO) 7.5-325 mg per tablet (Start on 4/27/2024)    Osteoarthritis of spine with radiculopathy, cervical region    Overview     Patient has chronic pain involving the back and shoulder.  History of trauma? Yes   Onset: over 15 years ago  Frequency: constant.  Progression since onset: unchanged.  Pain quality: 5/10.  Current treatment: Lortab ( Hydrocodone/Acetaminophen) and Norco, celebrex, gabapentin   Improvement on current treatment:  moderate  Treatments tried:  OTC acetaminophen, OTC analgesics, OTC ibuprofen, and Norco, ; Therapy: Physical Therapy - with minimal improvement; Injections: None; currently on celebrex and gabapentin for pain. Hydrocodone for breakthrough pain   Associated symptoms: no other symptoms  Previous imaging: X-ray, PET scan, and None   Substance abuse history:  None  Social History: None         Relevant Medications    HYDROcodone-acetaminophen (NORCO) 7.5-325 mg per tablet    HYDROcodone-acetaminophen (NORCO) 7.5-325 mg per tablet (Start on 3/29/2024)    HYDROcodone-acetaminophen (NORCO) 7.5-325 mg per tablet (Start on 4/27/2024)  Reviewed [place]   The current medical regimen is effective;  continue present plan and medications.          Orthopedic    Chronic left shoulder pain    Relevant Medications    HYDROcodone-acetaminophen (NORCO) 7.5-325 mg per tablet    HYDROcodone-acetaminophen (NORCO) 7.5-325 mg per tablet (Start on 3/29/2024)    HYDROcodone-acetaminophen (NORCO) 7.5-325 mg per tablet (Start on 4/27/2024)     Other Visit Diagnoses       Hyperlipidemia, unspecified hyperlipidemia type    -  Primary  The current medical regimen is effective;  continue present plan and medications.       Chronic use of opiate drug for therapeutic purpose        Relevant Medications    HYDROcodone-acetaminophen (NORCO) 7.5-325 mg per tablet    HYDROcodone-acetaminophen (NORCO) 7.5-325 mg per tablet (Start on 3/29/2024)    HYDROcodone-acetaminophen (NORCO) 7.5-325 mg per tablet (Start on 4/27/2024)    Prediabetes    / severe obesity with comorbidity   The patient is asked to make an attempt to improve diet and exercise patterns to aid in medical management of this problem.                     Follow up in about 4 months (around 6/29/2024), or if symptoms worsen or fail to improve.

## 2024-04-14 DIAGNOSIS — M54.12 CERVICAL RADICULOPATHY: ICD-10-CM

## 2024-04-14 NOTE — TELEPHONE ENCOUNTER
No care due was identified.  [facility] Embedded Care Due Messages. Reference number: [id number].   4/14/2024 1:24:27 PM CDT

## 2024-04-16 ENCOUNTER — PATIENT MESSAGE (OUTPATIENT)
Dept: ADMINISTRATIVE | Facility: HOSPITAL | Age: 64
End: 2024-04-16
Payer: COMMERCIAL

## 2024-04-17 RX ORDER — CELECOXIB 200 MG/1
200 CAPSULE ORAL 2 TIMES DAILY
Qty: 180 CAPSULE | Refills: 0 | Status: SHIPPED | OUTPATIENT
Start: 2024-04-17

## 2024-05-13 ENCOUNTER — TELEPHONE (OUTPATIENT)
Dept: ENDOSCOPY | Facility: HOSPITAL | Age: 64
End: 2024-05-13
Payer: COMMERCIAL

## 2024-05-13 ENCOUNTER — E-CONSULT (OUTPATIENT)
Dept: GASTROENTEROLOGY | Facility: CLINIC | Age: 64
End: 2024-05-13
Payer: COMMERCIAL

## 2024-05-13 ENCOUNTER — OFFICE VISIT (OUTPATIENT)
Dept: FAMILY MEDICINE | Facility: CLINIC | Age: 64
End: 2024-05-13
Payer: COMMERCIAL

## 2024-05-13 VITALS
OXYGEN SATURATION: 96 % | TEMPERATURE: 99 F | HEIGHT: 73 IN | RESPIRATION RATE: 16 BRPM | SYSTOLIC BLOOD PRESSURE: 138 MMHG | HEART RATE: 70 BPM | DIASTOLIC BLOOD PRESSURE: 80 MMHG | BODY MASS INDEX: 37.93 KG/M2 | WEIGHT: 286.19 LBS

## 2024-05-13 DIAGNOSIS — F17.200 SMOKER: ICD-10-CM

## 2024-05-13 DIAGNOSIS — R19.7 DIARRHEA, UNSPECIFIED TYPE: Primary | ICD-10-CM

## 2024-05-13 DIAGNOSIS — R06.2 WHEEZING: ICD-10-CM

## 2024-05-13 DIAGNOSIS — R93.2 ABNORMAL CT OF LIVER: ICD-10-CM

## 2024-05-13 DIAGNOSIS — G47.33 OSA (OBSTRUCTIVE SLEEP APNEA): ICD-10-CM

## 2024-05-13 DIAGNOSIS — Z72.0 TOBACCO ABUSE: ICD-10-CM

## 2024-05-13 PROCEDURE — 99214 OFFICE O/P EST MOD 30 MIN: CPT | Mod: 25,S$GLB,,

## 2024-05-13 PROCEDURE — 99999 PR PBB SHADOW E&M-EST. PATIENT-LVL IV: CPT | Mod: PBBFAC,,,

## 2024-05-13 PROCEDURE — 1160F RVW MEDS BY RX/DR IN RCRD: CPT | Mod: CPTII,S$GLB,,

## 2024-05-13 PROCEDURE — 99406 BEHAV CHNG SMOKING 3-10 MIN: CPT | Mod: S$GLB,,,

## 2024-05-13 PROCEDURE — 99446 NTRPROF PH1/NTRNET/EHR 5-10: CPT | Mod: S$GLB,,, | Performed by: INTERNAL MEDICINE

## 2024-05-13 PROCEDURE — 0241U SARS-COV2 (COVID) WITH FLU/RSV BY PCR: CPT

## 2024-05-13 PROCEDURE — 3075F SYST BP GE 130 - 139MM HG: CPT | Mod: CPTII,S$GLB,,

## 2024-05-13 PROCEDURE — 3008F BODY MASS INDEX DOCD: CPT | Mod: CPTII,S$GLB,,

## 2024-05-13 PROCEDURE — 1159F MED LIST DOCD IN RCRD: CPT | Mod: CPTII,S$GLB,,

## 2024-05-13 PROCEDURE — 3079F DIAST BP 80-89 MM HG: CPT | Mod: CPTII,S$GLB,,

## 2024-05-13 RX ORDER — ALBUTEROL SULFATE 90 UG/1
2 AEROSOL, METERED RESPIRATORY (INHALATION) EVERY 6 HOURS PRN
Qty: 18 G | Refills: 0 | Status: SHIPPED | OUTPATIENT
Start: 2024-05-13 | End: 2024-06-04 | Stop reason: SDUPTHER

## 2024-05-13 RX ORDER — PROMETHAZINE HYDROCHLORIDE AND DEXTROMETHORPHAN HYDROBROMIDE 6.25; 15 MG/5ML; MG/5ML
5 SYRUP ORAL EVERY 8 HOURS PRN
Qty: 50 ML | Refills: 0 | Status: SHIPPED | OUTPATIENT
Start: 2024-05-13 | End: 2024-05-23

## 2024-05-13 RX ORDER — FLUTICASONE PROPIONATE 50 MCG
1 SPRAY, SUSPENSION (ML) NASAL DAILY
Qty: 32 G | Refills: 0 | Status: SHIPPED | OUTPATIENT
Start: 2024-05-13

## 2024-05-13 NOTE — CONSULTS
[facility]  Response for E-Consult     Patient Name: Srinivasan Munoz  MRN: [id number]  Primary Care Provider: Yolanda Marques MD   Requesting Provider: Woo Beltran*  E-Consult to Gastroenterology  Consult performed by: Sera Sepulveda MD  Consult ordered by: Zaida Beltran PA-C  Reason for consult: Diarrhea          Recommendation: Stool Studies:  A stool C diff, fecal calprotectin, fecal elastase, giardia and cryptosporidium and a stool culture.    Recommend an EGD and colonoscopy.    If stool studies are negative then consider starting Imodium or Lomotil as needed.    Contingency: The patient is a 65 yo man with chronic diarrhea.    Total time of Consultation: 10 minute    I did not speak to the requesting provider verbally about this.     An Ambulatory Referral for an Endoscopy Order will be pended to the referring provider with the following:    Procedure: EGD/Colonoscopy    Diagnosis: Diarrhea    Procedure Timing: Within 4 weeks (Urgent)        *This eConsult is based on the clinical data available to me and is furnished without benefit of a physical examination. The eConsult will need to be interpreted in light of any clinical issues or changes in patient status not available to me at the time of filing this eConsults. Significant changes in patient condition or level of acuity should result in immediate formal consultation and reevaluation. Please alert me if you have further questions.    Thank you for this eConsult referral.     Sera Sepulveda MD  [facility]

## 2024-05-13 NOTE — PROGRESS NOTES
HPI     Chief Complaint:  Chief Complaint   Patient presents with    Cough     For about 9 days    bowel problem       Srinivasan Munoz is a 64 y.o. male with multiple medical diagnoses as listed in the medical history and problem list that presents for cough and diarrhea    Cough  This is a new problem. The current episode started 1 to 4 weeks ago (1.5 week). The problem occurs constantly. The cough is Productive of sputum (sputum is clear). Associated symptoms include headaches, postnasal drip and wheezing. Pertinent negatives include no chest pain, chills, ear congestion, ear pain, fever, nasal congestion, rhinorrhea, sore throat or shortness of breath (cough spell). Associated symptoms comments: Worst at night, cough attack can loose breath. The symptoms are aggravated by lying down. Risk factors for lung disease include smoking/tobacco exposure. He has tried steroid inhaler (Felicia-Montague plus Mucinex and Robitussin HP.  Tried tessalon pearls in the past and didn't think it helped) for the symptoms. The treatment provided no relief. There is no history of environmental allergies. was told by a doctor earlier signs of COPD   Started smoking at age 12. Currently smokes 1.5 pack cigarettes/ day  Denies sick contacts.  States his wife started to feel sick days after he was feeling sick for only 2 days but it suddenly stopped. Does not think daughters is having any symptoms.     Diarrhea  Empty bowels 15 to 20 mins after eating. Loose stools. Onset 1 year ago.     Diet: Lunch today- chips and sandwich, which he had to go to the bathroom as soon as he got here for his appointment. Meals are usually spaghetti, or rice and gravy with a meat and veggies, green beans/vegetables, meat,and rice, seafoods.  Denies any specific foods that trigger urgency but salads go right through him. Use to goes out to eat more regularly with family but has not so much recently because of needed a bathroom near by to go to. Denies any  travel 1 year ago or recently.     Tobacco Cessation  Discusses with patient for >10 mins about his want for smoking cessation. States he has tried everything and did not believed any of it worked. Had the classes, talked with someone, used Wellbutrin, Chantix, the patches and the gum. Expresses it is difficult b/c his wife smokes too.  They have tried to stop together.  They have also tried doing all these interventions together and neither 1 have been successful.  He wants to stop but doesn't believe anything provided has or will help.        Assessment & Plan   Cough- Flonase and cough syrup      Problem List Items Addressed This Visit    None  Visit Diagnoses       Diarrhea, unspecified type    -  Primary    Relevant Orders    E-Consult to Gastroenterology    CULTURE, STOOL    Wheezing        Relevant Orders    CT Chest Lung Screening Low Dose    SARS-Cov2 (COVID) with FLU/RSV by PCR    Smoker        Relevant Orders    CT Chest Lung Screening Low Dose          5/15/2024  Per GI e-consult: will add A stool C diff, fecal calprotectin, fecal elastase, giardia and cryptosporidium to order for diarrhea with the stool culture    --------------------------------------------      Health Maintenance:  Health Maintenance         Date Due Completion Date    Sign Pain Contract Never done ---    Complete Opioid Risk Tool Never done ---    Colorectal Cancer Screening Never done ---    LDCT Lung Screen Never done ---    RSV Vaccine (Age 60+ and Pregnant patients) (1 - 1-dose 60+ series) Never done ---    COVID-19 Vaccine (7 - 2023-24 season) 09/01/2023 2/25/2022    PROSTATE-SPECIFIC ANTIGEN 10/27/2024 10/27/2023    Hemoglobin A1c (Prediabetes) 10/27/2024 10/27/2023    TETANUS VACCINE 10/24/2027 10/24/2017    Lipid Panel 10/27/2028 10/27/2023            Health maintenance reviewed Will get a low dose chest CT scan for lung cancer screening. Celestely a smoker.    Follow Up:  No follow-ups on file.    Exam     Review of Systems:   (as noted above)  Review of Systems   Constitutional:  Negative for chills and fever.   HENT:  Positive for postnasal drip. Negative for congestion, ear pain, rhinorrhea and sore throat.    Eyes:  Negative for visual disturbance.   Respiratory:  Positive for cough and wheezing. Negative for shortness of breath (cough spell).    Cardiovascular:  Negative for chest pain.   Gastrointestinal:  Positive for diarrhea (for over 1 year). Negative for abdominal pain, nausea and vomiting.   Genitourinary:  Negative for difficulty urinating.   Musculoskeletal:  Negative for neck pain and neck stiffness.   Allergic/Immunologic: Negative for environmental allergies.   Neurological:  Positive for headaches.       Physical Exam:   Physical Exam  Constitutional:       General: He is not in acute distress.     Appearance: He is not ill-appearing, toxic-appearing or diaphoretic.   HENT:      Head: Normocephalic and atraumatic.   Cardiovascular:      Rate and Rhythm: Normal rate and regular rhythm.      Pulses: Normal pulses.      Heart sounds: No murmur heard.  Pulmonary:      Effort: Pulmonary effort is normal. No respiratory distress.      Breath sounds: Normal breath sounds. No stridor. No rhonchi or rales.      Comments: Slight wheeze with exhale in sinus/upper airway    Abdominal:      General: Bowel sounds are normal. There is no distension.      Palpations: Abdomen is soft. There is no mass.      Tenderness: There is no abdominal tenderness. There is no guarding.   Musculoskeletal:      Right lower leg: No edema.      Left lower leg: No edema.   Neurological:      Mental Status: He is alert and oriented to person, place, and time.   Psychiatric:         Mood and Affect: Mood normal.       Vitals:    05/13/24 1307   BP: 138/80   BP Location: Left arm   Patient Position: Sitting   BP Method: Large (Manual)   Pulse: 70   Resp: 16   Temp: 98.7 °F (37.1 °C)   TempSrc: Oral   SpO2: 96%   Weight: 129.8 kg (286 lb 2.5 oz)   Height: 6'  "1" (1.854 m)      Body mass index is 37.75 kg/m².        History     Past Medical History:  Past Medical History:   Diagnosis Date    Arthritis     Eye injury 20 yrs ago    nail stuck in od     Fractured bone     right foot    Nuclear sclerosis of left eye 6/25/2018    Retinal detachment 20 yrs ago     od     Squamous cell carcinoma of skin        Past Surgical History:  Past Surgical History:   Procedure Laterality Date    CATARACT EXTRACTION Left 06/25/2018    Dr. Pa    CATARACT EXTRACTION W/  INTRAOCULAR LENS IMPLANT Right 1999    Dr. Landry    EPIDURAL STEROID INJECTION Left 5/1/2019    Procedure: Axillary, Suprascapular, and Lateral Pectoral Nerve Blocks;  Surgeon: Dameon Austin Jr., MD;  Location: [facility];  Service: Pain Management;  Laterality: Left;  Left Axillary, Suprascapular and Lateral Pectoral Nerve Blocks under Fluoroscopy    50041    Arrive @ 0900; NO Sedation; Confirm with Edmundo; (No DM or anticoag)    EPIDURAL STEROID INJECTION Left 5/16/2019    Procedure: Nerve Radiofrequency Thermocoagulation;  Surgeon: Dameon Austin Jr., MD;  Location: [facility];  Service: Pain Management;  Laterality: Left;  Left Axillary, Suprascapular and Lateral Pectoral Nerve Radiofrequency Ablations    64178    Arrive @ 1100; IV Sedation- Fasting; No Anticoags or DM    EPIDURAL STEROID INJECTION N/A 10/2/2019    Procedure: Injection, Steroid, Epidural Cervical;  Surgeon: Dameon Austin Jr., MD;  Location: [facility];  Service: Pain Management;  Laterality: N/A;  C7-T1 JHOAN    [id number]    Arrive @ 1130; No ATC or DM    EYE SURGERY      INTRAOCULAR PROSTHESES INSERTION Left 6/25/2018    Procedure: INSERTION-INTRAOCULAR LENS (IOL);  Surgeon: Funmilayo Pa MD;  Location: [facility];  Service: Ophthalmology;  Laterality: Left;    LASIK Bilateral 2000    PHACOEMULSIFICATION OF CATARACT Left 6/25/2018    Procedure: PHACOEMULSIFICATION-ASPIRATION-CATARACT;  Surgeon: Funmilayo Pa MD;  Location: [facility];  Service: " Ophthalmology;  Laterality: Left;    REPOSITIONING OF INTRAOCULAR LENS Right 9/7/2023    Procedure: REPOSITIONING, IOL;  Surgeon: Funmilayo Pa MD;  Location: [facility];  Service: Ophthalmology;  Laterality: Right;  Need IOL removal and Yamane IOL OD  Zeiss CT Virginie 24.0  1hr    RETINAL DETACHMENT SURGERY Right 1999    Dr. Landry       Social History:  Social History     Socioeconomic History    Marital status:    Tobacco Use    Smoking status: Every Day     Current packs/day: 1.00     Average packs/day: 1 pack/day for 42.0 years (42.0 ttl pk-yrs)     Types: Cigarettes    Smokeless tobacco: Current   Substance and Sexual Activity    Alcohol use: No    Drug use: No    Sexual activity: Yes     Partners: Female     Social Determinants of Health     Financial Resource Strain: Patient Declined (9/8/2023)    Overall Financial Resource Strain (CARDIA)     Difficulty of Paying Living Expenses: Patient declined   Food Insecurity: Patient Declined (9/8/2023)    Hunger Vital Sign     Worried About Running Out of Food in the Last Year: Patient declined     Ran Out of Food in the Last Year: Patient declined   Transportation Needs: Patient Declined (9/8/2023)    PRAPARE - Transportation     Lack of Transportation (Medical): Patient declined     Lack of Transportation (Non-Medical): Patient declined   Physical Activity: Unknown (9/8/2023)    Exercise Vital Sign     Days of Exercise per Week: Patient declined   Stress: Patient Declined (9/8/2023)    [employer] - Occupational Stress Questionnaire     Feeling of Stress : Patient declined   Housing Stability: Unknown (9/8/2023)    Housing Stability Vital Sign     Unable to Pay for Housing in the Last Year: Patient refused     Unstable Housing in the Last Year: Patient refused       Family History:  Family History   Problem Relation Name Age of Onset    Diabetes Mother      Stroke Mother      Cancer Mother      Hypertension Mother      Heart disease  Father      Hypertension Father      No Known Problems Sister      Cancer Brother      Stroke Brother      Diabetes Brother      No Known Problems Maternal Aunt      No Known Problems Maternal Uncle      No Known Problems Paternal Aunt      No Known Problems Paternal Uncle      No Known Problems Maternal Grandmother      No Known Problems Maternal Grandfather      No Known Problems Paternal Grandmother      No Known Problems Paternal Grandfather      Amblyopia Neg Hx      Blindness Neg Hx      Cataracts Neg Hx      Glaucoma Neg Hx      Macular degeneration Neg Hx      Retinal detachment Neg Hx      Strabismus Neg Hx      Thyroid disease Neg Hx         Allergies and Medications: (updated and reviewed)  Review of patient's allergies indicates:   Allergen Reactions    Penicillins Hives     Current Outpatient Medications   Medication Sig Dispense Refill    celecoxib (CELEBREX) 200 MG capsule Take 1 capsule by mouth twice daily 180 capsule 0    gabapentin (NEURONTIN) 800 MG tablet Take 1 tablet (800 mg total) by mouth 3 (three) times daily. 270 tablet 1    HYDROcodone-acetaminophen (NORCO) 7.5-325 mg per tablet Take 1 tablet by mouth every 12 (twelve) hours as needed for Pain. 60 tablet 0    ofloxacin (OCUFLOX) 0.3 % ophthalmic solution Place 1 drop into the right eye 3 (three) times daily.      prednisoLONE acetate (PRED FORTE) 1 % DrpS Place 1 drop into the right eye 3 (three) times daily.      pulse oximeter (PULSE OXIMETER) device by Apply Externally route 2 (two) times a day. Use twice daily at 8 AM and 3 PM and [facility]. 1 each 0    VITAMIN A ORAL Take by mouth.      ZIRGAN 0.15 % Gel Place into the right eye 4 (four) times daily.      albuterol (VENTOLIN HFA) 90 mcg/actuation inhaler Inhale 2 puffs into the lungs every 6 (six) hours as needed for Wheezing. Rescue 18 g 0    DIPH,PERTUSS,ACEL,TET-VAC,(ADACEL,TDAP ADOLESN/ADULT,PF)2 Lf-(2.5-5-3-5 mcg)-5Lf/0.5 mL Syrg  (Patient not  taking: Reported on 5/13/2024)      fluticasone propionate (FLONASE) 50 mcg/actuation nasal spray 1 spray (50 mcg total) by Each Nostril route once daily. 32 each 0    promethazine-dextromethorphan (PROMETHAZINE-DM) 6.25-15 mg/5 mL Syrp Take 5 mLs by mouth every 8 (eight) hours as needed (congestion). 50 mL 0     No current facility-administered medications for this visit.       Patient Care Team:  Yolanda Marques MD as PCP - General (Family Medicine)  Carlita Watts LPN as Licensed Practical Nurse         - The patient is given an After Visit Summary that lists all medications with directions, allergies, education, orders placed during this encounter and follow-up instructions.      - I have reviewed the patient's medical information including past medical, family, and social history sections including the medications and allergies.      - We discussed the patient's current medications.     This note was created by combination of typed  and MModal dictation.  Transcription errors may be present.  If there are any questions, please contact me.       Zaida Beltran PA-C

## 2024-05-13 NOTE — TELEPHONE ENCOUNTER
Called pt to schedule egd/colonoscopy. Pt needs to collect stool samples before scheduling colonoscopy.will continue to monitor-tb

## 2024-05-13 NOTE — PROGRESS NOTES
Health Maintenance Due   Topic     Sign Pain Contract  CONSULT WITH PCP    Complete Opioid Risk Tool  CONSULT WITH PCP    Colorectal Cancer Screening  CONSULT WITH PCP    LDCT Lung Screen  CONSULT WITH PCP    RSV Vaccine (Age 60+ and Pregnant patients) (1 - 1-dose 60+ series) Not given at this facility       COVID-19 Vaccine (7 - 2023-24 season) CONSULT WITH PCP

## 2024-05-14 ENCOUNTER — TELEPHONE (OUTPATIENT)
Dept: ENDOSCOPY | Facility: HOSPITAL | Age: 64
End: 2024-05-14
Payer: COMMERCIAL

## 2024-05-14 LAB
INFLUENZA A, MOLECULAR: NOT DETECTED
INFLUENZA B, MOLECULAR: NOT DETECTED
RSV AG BY MOLECULAR METHOD: NOT DETECTED
SARS-COV-2 RNA RESP QL NAA+PROBE: NOT DETECTED

## 2024-05-14 NOTE — TELEPHONE ENCOUNTER
Spoke with pt to schedule urgent egd/colonoscopy. Pt declined to schedule at this time. Had a death in family and will be out of town. Pt stated will call back when returns to schedule procedures

## 2024-05-16 ENCOUNTER — HOSPITAL ENCOUNTER (OUTPATIENT)
Dept: RADIOLOGY | Facility: HOSPITAL | Age: 64
Discharge: HOME | End: 2024-05-16
Payer: COMMERCIAL

## 2024-05-16 DIAGNOSIS — R06.2 WHEEZING: ICD-10-CM

## 2024-05-16 DIAGNOSIS — F17.200 SMOKER: ICD-10-CM

## 2024-05-16 PROCEDURE — 71271 CT THORAX LUNG CANCER SCR C-: CPT | Mod: TC

## 2024-05-16 PROCEDURE — 71271 CT THORAX LUNG CANCER SCR C-: CPT | Mod: 26,,, | Performed by: INTERNAL MEDICINE

## 2024-05-17 ENCOUNTER — TELEPHONE (OUTPATIENT)
Dept: FAMILY MEDICINE | Facility: CLINIC | Age: 64
End: 2024-05-17
Payer: COMMERCIAL

## 2024-05-17 NOTE — TELEPHONE ENCOUNTER
----- Message from Marcie Castillo sent at 5/17/2024  8:35 AM CDT -----  .Type:  Test Results    Who Called: Tata - WIFE     Name of Test (Lab/Mammo/Etc):CT Scan of Lungs     Date of Test: 5/16/2024    Ordering Provider: Zaida Huggins    Where the test was performed: [facility], Radiology    Would the patient rather a call back or a response via My [facility]? Call Back     Best Call Back Number: [phone]      Additional Information:      For Clinical Team:Has the provider reviewed the results?

## 2024-05-17 NOTE — TELEPHONE ENCOUNTER
----- Message from Zaida Beltran PA-C sent at 5/16/2024  4:14 PM CDT -----  Please contact the patient and let them know that their CT results need to be followed-up with an ultrasound of the liver. Thank you!

## 2024-05-24 ENCOUNTER — HOSPITAL ENCOUNTER (OUTPATIENT)
Dept: RADIOLOGY | Facility: HOSPITAL | Age: 64
Discharge: HOME | End: 2024-05-24
Payer: COMMERCIAL

## 2024-05-24 DIAGNOSIS — R93.2 ABNORMAL CT OF LIVER: ICD-10-CM

## 2024-05-24 PROCEDURE — 76705 ECHO EXAM OF ABDOMEN: CPT | Mod: 26,,, | Performed by: RADIOLOGY

## 2024-05-24 PROCEDURE — 76705 ECHO EXAM OF ABDOMEN: CPT | Mod: TC

## 2024-06-04 DIAGNOSIS — R06.2 WHEEZING: Primary | ICD-10-CM

## 2024-06-04 RX ORDER — ALBUTEROL SULFATE 90 UG/1
2 AEROSOL, METERED RESPIRATORY (INHALATION) EVERY 6 HOURS PRN
Qty: 18 G | Refills: 0 | Status: SHIPPED | OUTPATIENT
Start: 2024-06-04 | End: 2025-06-04

## 2024-06-08 DIAGNOSIS — M47.22 OSTEOARTHRITIS OF SPINE WITH RADICULOPATHY, CERVICAL REGION: ICD-10-CM

## 2024-06-08 DIAGNOSIS — G89.29 CHRONIC LEFT SHOULDER PAIN: ICD-10-CM

## 2024-06-08 DIAGNOSIS — M50.30 DEGENERATIVE CERVICAL DISC: ICD-10-CM

## 2024-06-08 DIAGNOSIS — M25.512 CHRONIC LEFT SHOULDER PAIN: ICD-10-CM

## 2024-06-08 DIAGNOSIS — Z79.891 CHRONIC USE OF OPIATE DRUG FOR THERAPEUTIC PURPOSE: ICD-10-CM

## 2024-06-08 NOTE — TELEPHONE ENCOUNTER
No care due was identified.  [facility] Embedded Care Due Messages. Reference number: [id number].   6/08/2024 6:03:49 PM CDT

## 2024-06-12 RX ORDER — HYDROCODONE BITARTRATE AND ACETAMINOPHEN 7.5; 325 MG/1; MG/1
1 TABLET ORAL EVERY 12 HOURS PRN
Qty: 60 TABLET | Refills: 0 | Status: SHIPPED | OUTPATIENT
Start: 2024-06-12 | End: 2024-07-18

## 2024-06-13 ENCOUNTER — TELEPHONE (OUTPATIENT)
Dept: FAMILY MEDICINE | Facility: CLINIC | Age: 64
End: 2024-06-13
Payer: COMMERCIAL

## 2024-06-13 NOTE — TELEPHONE ENCOUNTER
----- Message from Bina Interiano sent at 6/13/2024  9:13 AM CDT -----  Regarding: wife  Type: RX Refill Request     Who Called:wife     Have you contacted your pharmacy:     Refill     RX Name and Strength:HYDROcodone-acetaminophen (NORCO) 7.5-325 mg per tablet     Preferred Pharmacy with phone number:   [facility]         Local or Mail Order: local     Would the patient rather a call back or a response [facility]? call     Best Call Back Number: [phone]       Additional Information:     Thank you.

## 2024-06-17 ENCOUNTER — TELEPHONE (OUTPATIENT)
Dept: ENDOSCOPY | Facility: HOSPITAL | Age: 64
End: 2024-06-17
Payer: COMMERCIAL

## 2024-06-17 NOTE — TELEPHONE ENCOUNTER
Dear Referring Provider and Staff,    The Endoscopy Scheduling Department has made 2+ attempts to reach the patient for scheduling their EGD and colonoscopy. Patient declined to schedule each time contacted and states he will call back to schedule. All final attempts have been made for this referral, if the referring provider would like the patient to receive endoscopic care, please confirm with the patient whether they would like to proceed. If so, then submit a new referral and inform the Pt that the Endoscopy Scheduling department will be contacting them to schedule their procedure.      Thank you,    Endoscopy Scheduling Department

## 2024-06-17 NOTE — TELEPHONE ENCOUNTER
Spoke with patient stated he is going out of time for work , he stated he informed  endo that he was going out of time for work and he would call and schedule an appointment when he returns. Patient stated he will discuss this with you during his visit on 6/27/24. Please be advised Dr Marques thank you.

## 2024-06-17 NOTE — TELEPHONE ENCOUNTER
"Contacted the patient to schedule an urgent endoscopy procedure(s) EGD and colonoscopy. Spoke with patient. Patient states "it is not a good time for me right now. I am getting ready to go out of town. I will to schedule when I get back". Patient declined to schedule at this time.   "

## 2024-06-25 DIAGNOSIS — R06.2 WHEEZING: ICD-10-CM

## 2024-06-25 RX ORDER — ALBUTEROL SULFATE 90 UG/1
2 AEROSOL, METERED RESPIRATORY (INHALATION) EVERY 6 HOURS PRN
Qty: 18 G | Refills: 0 | Status: SHIPPED | OUTPATIENT
Start: 2024-06-25 | End: 2025-06-25

## 2024-06-27 ENCOUNTER — OFFICE VISIT (OUTPATIENT)
Dept: FAMILY MEDICINE | Facility: CLINIC | Age: 64
End: 2024-06-27
Payer: COMMERCIAL

## 2024-06-27 VITALS
WEIGHT: 279.13 LBS | OXYGEN SATURATION: 97 % | BODY MASS INDEX: 36.99 KG/M2 | TEMPERATURE: 99 F | DIASTOLIC BLOOD PRESSURE: 80 MMHG | HEIGHT: 73 IN | HEART RATE: 56 BPM | SYSTOLIC BLOOD PRESSURE: 130 MMHG

## 2024-06-27 DIAGNOSIS — M50.30 DEGENERATIVE CERVICAL DISC: ICD-10-CM

## 2024-06-27 DIAGNOSIS — M47.22 OSTEOARTHRITIS OF SPINE WITH RADICULOPATHY, CERVICAL REGION: ICD-10-CM

## 2024-06-27 DIAGNOSIS — G89.29 CHRONIC LEFT SHOULDER PAIN: ICD-10-CM

## 2024-06-27 DIAGNOSIS — Z79.891 CHRONIC USE OF OPIATE DRUG FOR THERAPEUTIC PURPOSE: ICD-10-CM

## 2024-06-27 DIAGNOSIS — M25.512 CHRONIC LEFT SHOULDER PAIN: ICD-10-CM

## 2024-06-27 DIAGNOSIS — Z00.00 GENERAL MEDICAL EXAM: Primary | ICD-10-CM

## 2024-06-27 PROCEDURE — 99999 PR PBB SHADOW E&M-EST. PATIENT-LVL IV: CPT | Mod: PBBFAC,,, | Performed by: FAMILY MEDICINE

## 2024-06-27 RX ORDER — HYDROCODONE BITARTRATE AND ACETAMINOPHEN 7.5; 325 MG/1; MG/1
1 TABLET ORAL EVERY 12 HOURS PRN
Qty: 60 TABLET | Refills: 0 | Status: SHIPPED | OUTPATIENT
Start: 2024-08-24 | End: 2024-09-22

## 2024-06-27 RX ORDER — HYDROCODONE BITARTRATE AND ACETAMINOPHEN 7.5; 325 MG/1; MG/1
1 TABLET ORAL EVERY 12 HOURS PRN
Qty: 60 TABLET | Refills: 0 | Status: SHIPPED | OUTPATIENT
Start: 2024-06-27 | End: 2024-07-27

## 2024-06-27 RX ORDER — HYDROCODONE BITARTRATE AND ACETAMINOPHEN 7.5; 325 MG/1; MG/1
1 TABLET ORAL EVERY 12 HOURS PRN
Qty: 60 TABLET | Refills: 0 | Status: SHIPPED | OUTPATIENT
Start: 2024-07-26 | End: 2024-08-24

## 2024-06-27 NOTE — PROGRESS NOTES
"Routine Office Visit    Srinivasan Munoz  1960  [id number]      Subjective     Srinivasan is a 64 y.o. male who presents today for:    Shoulder pain, back pain and ankle pain - chronic condition for patient - Patient is currently on gabapentin and celebrex and continues to have severe pain. He takes hydrocodone for severe pain. He is requesting refill for medication. He does not abuse medication. Medication helps with to continue to work  Weight - Patient has been struggling with his weight. He is interested in weight management       Objective     Review of Systems   Constitutional:  Negative for chills and fever.   HENT:  Negative for congestion.    Eyes:  Negative for blurred vision.   Respiratory:  Negative for cough.    Cardiovascular:  Negative for chest pain.   Gastrointestinal:  Negative for abdominal pain, constipation, diarrhea, heartburn, nausea and vomiting.   Genitourinary:  Negative for dysuria.   Musculoskeletal:  Negative for myalgias.   Skin:  Negative for itching and rash.   Neurological:  Negative for dizziness and headaches.   Psychiatric/Behavioral:  Negative for depression.      /80   Pulse (!) 56   Temp 98.6 °F (37 °C) (Oral)   Ht 6' 1" (1.854 m)   Wt 126.6 kg (279 lb 1.6 oz)   SpO2 97%   BMI 36.82 kg/m²   Physical Exam  Constitutional:       Appearance: He is well-developed.   HENT:      Head: Normocephalic and atraumatic.   Eyes:      Conjunctiva/sclera: Conjunctivae normal.      Pupils: Pupils are equal, round, and reactive to light.   Neck:      Thyroid: No thyromegaly.      Vascular: No JVD.   Cardiovascular:      Rate and Rhythm: Normal rate and regular rhythm.      Heart sounds: Normal heart sounds.   Pulmonary:      Effort: Pulmonary effort is normal.      Breath sounds: Normal breath sounds. No wheezing.   Abdominal:      General: Bowel sounds are normal. There is no distension.      Palpations: Abdomen is soft.      Tenderness: There is no abdominal tenderness. There is " no guarding.   Musculoskeletal:         General: Normal range of motion.      Cervical back: Normal range of motion and neck supple.   Lymphadenopathy:      Cervical: No cervical adenopathy.   Skin:     General: Skin is warm and dry.   Neurological:      Mental Status: He is alert and oriented to person, place, and time.   Psychiatric:         Behavior: Behavior normal.             Assessment     Health Maintenance         Date Due Completion Date    Sign Pain Contract Never done ---    Complete Opioid Risk Tool Never done ---    Colorectal Cancer Screening Never done ---    RSV Vaccine (Age 60+ and Pregnant patients) (1 - 1-dose 60+ series) Never done ---    COVID-19 Vaccine (7 - 2023-24 season) 09/01/2023 2/25/2022    PROSTATE-SPECIFIC ANTIGEN 10/27/2024 10/27/2023    Hemoglobin A1c (Prediabetes) 10/27/2024 10/27/2023    LDCT Lung Screen 05/16/2025 5/16/2024    TETANUS VACCINE 10/24/2027 10/24/2017    Lipid Panel 10/27/2028 10/27/2023              Problem List Items Addressed This Visit          Neuro    Degenerative cervical disc    Overview     With radiculopathy  Pt previously on gabapentin; would like to restart         Relevant Medications    HYDROcodone-acetaminophen (NORCO) 7.5-325 mg per tablet    HYDROcodone-acetaminophen (NORCO) 7.5-325 mg per tablet (Start on 7/26/2024)    HYDROcodone-acetaminophen (NORCO) 7.5-325 mg per tablet (Start on 8/24/2024)    Osteoarthritis of spine with radiculopathy, cervical region    Overview     Patient has chronic pain involving the back and shoulder.  History of trauma? No.  Onset: Approximately few years ago.  Frequency: constant.  Progression since onset: unchanged.  Pain quality: 5/10.  Current treatment: Lortab ( Hydrocodone/Acetaminophen) and Norco, celebrex, gabapentin   Improvement on current treatment: moderate  Treatments tried:  OTC acetaminophen, OTC analgesics, OTC ibuprofen, and Norco, ; Therapy: Physical Therapy; Injections: None - with mild  improvement  Associated symptoms: no other symptoms  Previous imaging: X-ray, PET scan, and None   Substance abuse history:  None  Social History: None         Relevant Medications    HYDROcodone-acetaminophen (NORCO) 7.5-325 mg per tablet    HYDROcodone-acetaminophen (NORCO) 7.5-325 mg per tablet (Start on 7/26/2024)    HYDROcodone-acetaminophen (NORCO) 7.5-325 mg per tablet (Start on 8/24/2024)       Psychiatric    Chronic use of opiate drug for therapeutic purpose    Relevant Medications    HYDROcodone-acetaminophen (NORCO) 7.5-325 mg per tablet    HYDROcodone-acetaminophen (NORCO) 7.5-325 mg per tablet (Start on 7/26/2024)    HYDROcodone-acetaminophen (NORCO) 7.5-325 mg per tablet (Start on 8/24/2024)  Reviewed LA          Endocrine    BMI 35.0-35.9,adult    Overview     The patient is asked to make an attempt to improve diet and exercise patterns to aid in medical management of this problem.  Recommend to stop peanut butter and chocolate   Recommend to eat portion controlled foods   Avoid processed carbohydrates  Eat whole foods   Increase sleep   Diet and exercise planning discussed.              Orthopedic    Chronic left shoulder pain    Relevant Medications    HYDROcodone-acetaminophen (NORCO) 7.5-325 mg per tablet    HYDROcodone-acetaminophen (NORCO) 7.5-325 mg per tablet (Start on 7/26/2024)    HYDROcodone-acetaminophen (NORCO) 7.5-325 mg per tablet (Start on 8/24/2024)     Other Visit Diagnoses       General medical exam    -  Primary    Relevant Orders    CBC Auto Differential    Comprehensive Metabolic Panel    Lipid Panel    TSH    Hemoglobin A1C  I addressed all major concerns as it related to health maintenance.  All were ordered and scheduled based on the patients wishes.  Any additional health maintenance will be readdressed at the next physical if declined or deferred by the patient.                     Follow up in about 4 months (around 10/27/2024), or if symptoms worsen or fail to  improve.

## 2024-07-13 DIAGNOSIS — M54.12 CERVICAL RADICULOPATHY: ICD-10-CM

## 2024-07-13 NOTE — TELEPHONE ENCOUNTER
No care due was identified.  Health [facility] Embedded Care Due Messages. Reference number: [id number].   7/13/2024 9:28:11 AM CDT

## 2024-07-16 RX ORDER — CELECOXIB 200 MG/1
200 CAPSULE ORAL 2 TIMES DAILY
Qty: 180 CAPSULE | Refills: 0 | Status: SHIPPED | OUTPATIENT
Start: 2024-07-16

## 2024-07-16 NOTE — TELEPHONE ENCOUNTER
Refill Routing Note   Medication(s) are not appropriate for processing by [facility] for the following reason(s):        Outside of protocol    [facility] action(s):  Route               Appointments  past 12m or future 3m with PCP    Date Provider   Last Visit   6/27/2024 Yolanda Marques MD   Next Visit   9/27/2024 Yolanda Marques MD   ED visits in past 90 days: 0        Note composed:9:40 AM 07/16/2024

## 2024-08-27 DIAGNOSIS — M50.30 DEGENERATIVE CERVICAL DISC: ICD-10-CM

## 2024-08-27 RX ORDER — GABAPENTIN 800 MG/1
800 TABLET ORAL 3 TIMES DAILY
Qty: 270 TABLET | Refills: 1 | Status: SHIPPED | OUTPATIENT
Start: 2024-08-27

## 2024-08-27 NOTE — TELEPHONE ENCOUNTER
Refill Routing Note   Medication(s) are not appropriate for processing by [facility] for the following reason(s):        Outside of protocol    [facility] action(s):  Route               Appointments  past 12m or future 3m with PCP    Date Provider   Last Visit   6/27/2024 Yolanda Marques MD   Next Visit   9/27/2024 Yolanda Marques MD   ED visits in past 90 days: 0        Note composed:12:35 PM 08/27/2024

## 2024-08-27 NOTE — TELEPHONE ENCOUNTER
No care due was identified.  Health [facility] Embedded Care Due Messages. Reference number: [id number].   8/27/2024 10:21:48 AM CDT

## 2024-09-18 ENCOUNTER — PATIENT MESSAGE (OUTPATIENT)
Dept: INTERNAL MEDICINE | Facility: CLINIC | Age: 64
End: 2024-09-18

## 2024-09-18 ENCOUNTER — HOSPITAL ENCOUNTER (OUTPATIENT)
Dept: RADIOLOGY | Facility: HOSPITAL | Age: 64
Discharge: HOME | End: 2024-09-18
Attending: INTERNAL MEDICINE
Payer: COMMERCIAL

## 2024-09-18 ENCOUNTER — OFFICE VISIT (OUTPATIENT)
Dept: INTERNAL MEDICINE | Facility: CLINIC | Age: 64
End: 2024-09-18
Payer: COMMERCIAL

## 2024-09-18 VITALS
HEART RATE: 59 BPM | SYSTOLIC BLOOD PRESSURE: 128 MMHG | DIASTOLIC BLOOD PRESSURE: 68 MMHG | OXYGEN SATURATION: 93 % | BODY MASS INDEX: 35.85 KG/M2 | WEIGHT: 270.5 LBS | HEIGHT: 73 IN

## 2024-09-18 DIAGNOSIS — G89.29 CHRONIC LEFT SHOULDER PAIN: ICD-10-CM

## 2024-09-18 DIAGNOSIS — M47.22 OSTEOARTHRITIS OF SPINE WITH RADICULOPATHY, CERVICAL REGION: ICD-10-CM

## 2024-09-18 DIAGNOSIS — Z79.891 CHRONIC USE OF OPIATE DRUG FOR THERAPEUTIC PURPOSE: ICD-10-CM

## 2024-09-18 DIAGNOSIS — I70.0 ATHEROSCLEROSIS OF AORTA: Primary | Chronic | ICD-10-CM

## 2024-09-18 DIAGNOSIS — M25.512 CHRONIC LEFT SHOULDER PAIN: ICD-10-CM

## 2024-09-18 DIAGNOSIS — Z72.0 TOBACCO USE: Chronic | ICD-10-CM

## 2024-09-18 DIAGNOSIS — I25.10 ATHEROSCLEROSIS OF NATIVE CORONARY ARTERY OF NATIVE HEART WITHOUT ANGINA PECTORIS: Chronic | ICD-10-CM

## 2024-09-18 DIAGNOSIS — J43.2 CENTRILOBULAR EMPHYSEMA: Chronic | ICD-10-CM

## 2024-09-18 DIAGNOSIS — M50.30 DEGENERATIVE CERVICAL DISC: ICD-10-CM

## 2024-09-18 DIAGNOSIS — J44.1 COPD WITH ACUTE EXACERBATION: Primary | ICD-10-CM

## 2024-09-18 DIAGNOSIS — J44.1 COPD WITH ACUTE EXACERBATION: ICD-10-CM

## 2024-09-18 PROBLEM — R06.2 WHEEZING: Status: RESOLVED | Noted: 2024-05-13 | Resolved: 2024-09-18

## 2024-09-18 PROCEDURE — 71046 X-RAY EXAM CHEST 2 VIEWS: CPT | Mod: 26,,, | Performed by: RADIOLOGY

## 2024-09-18 PROCEDURE — 3008F BODY MASS INDEX DOCD: CPT | Mod: CPTII,S$GLB,, | Performed by: INTERNAL MEDICINE

## 2024-09-18 PROCEDURE — 71046 X-RAY EXAM CHEST 2 VIEWS: CPT | Mod: TC

## 2024-09-18 PROCEDURE — 99215 OFFICE O/P EST HI 40 MIN: CPT | Mod: 25,S$GLB,, | Performed by: INTERNAL MEDICINE

## 2024-09-18 PROCEDURE — 3078F DIAST BP <80 MM HG: CPT | Mod: CPTII,S$GLB,, | Performed by: INTERNAL MEDICINE

## 2024-09-18 PROCEDURE — 99999 PR PBB SHADOW E&M-EST. PATIENT-LVL IV: CPT | Mod: PBBFAC,,, | Performed by: INTERNAL MEDICINE

## 2024-09-18 PROCEDURE — 3074F SYST BP LT 130 MM HG: CPT | Mod: CPTII,S$GLB,, | Performed by: INTERNAL MEDICINE

## 2024-09-18 PROCEDURE — 1159F MED LIST DOCD IN RCRD: CPT | Mod: CPTII,S$GLB,, | Performed by: INTERNAL MEDICINE

## 2024-09-18 PROCEDURE — 99406 BEHAV CHNG SMOKING 3-10 MIN: CPT | Mod: S$GLB,,, | Performed by: INTERNAL MEDICINE

## 2024-09-18 PROCEDURE — 1160F RVW MEDS BY RX/DR IN RCRD: CPT | Mod: CPTII,S$GLB,, | Performed by: INTERNAL MEDICINE

## 2024-09-18 RX ORDER — FLUTICASONE FUROATE AND VILANTEROL 100; 25 UG/1; UG/1
1 POWDER RESPIRATORY (INHALATION) DAILY
Qty: 60 EACH | Refills: 0 | Status: SHIPPED | OUTPATIENT
Start: 2024-09-18

## 2024-09-18 RX ORDER — HYDROCODONE BITARTRATE AND ACETAMINOPHEN 7.5; 325 MG/1; MG/1
1 TABLET ORAL EVERY 12 HOURS PRN
Qty: 60 TABLET | Refills: 0 | Status: SHIPPED | OUTPATIENT
Start: 2024-09-18 | End: 2024-10-20

## 2024-09-18 RX ORDER — ALBUTEROL SULFATE 90 UG/1
1-2 AEROSOL, METERED RESPIRATORY (INHALATION)
Qty: 18 G | Refills: 0 | Status: SHIPPED | OUTPATIENT
Start: 2024-09-18 | End: 2025-09-18

## 2024-09-18 RX ORDER — AZITHROMYCIN 250 MG/1
TABLET, FILM COATED ORAL
Qty: 6 EACH | Refills: 0 | Status: SHIPPED | OUTPATIENT
Start: 2024-09-18

## 2024-09-18 RX ORDER — METHYLPREDNISOLONE 4 MG/1
TABLET ORAL
Qty: 1 EACH | Refills: 0 | Status: SHIPPED | OUTPATIENT
Start: 2024-09-18

## 2024-09-18 NOTE — PROGRESS NOTES
Assessment & Plan (all problems are new to me)  Problem List Items Addressed This Visit          Pulmonary    Centrilobular emphysema (Chronic)    Overview     PFT 2023 mild-to-moderate obstruction  Low-dose CT May 20, 2024 with paraseptal and centrilobular emphysema         Current Assessment & Plan     I will start the patient on Breo as this seems to be 1 of the medicines on his formulary.  I will also start him on albuterol for rescue.  I am giving him a spacer to help with this..  We discussed the need to follow up with his PCP about this to see how he is doing on his medications and to have dose adjustments as needed.    Of note is PFTs stated that he not have much improvement with bronchodilator.  We will need to monitor whether or not he needs a LAMA         Relevant Medications    albuterol (VENTOLIN HFA) 90 mcg/actuation inhaler    fluticasone furoate-vilanteroL (BREO) 100-25 mcg/dose diskus inhaler       Other    Tobacco use (Chronic)    Overview     - declined smoking cessation.  S/p chantix and wellbutrin.  Baseline pft         Current Assessment & Plan     Patient report he has tried Chantix and Wellbutrin in the past without success.  He has been smoking since she was around 12 years old.  I counseled the patient on smoking cessation, risks associated with smoking, having a quit date, nicotine replacement, medications that can aid in cessation, and having a plan for future cravings.  The patient seems pre contemplative.  I counseled the patient for a total of 5 minute            Other Visit Diagnoses       COPD with acute exacerbation    -  Primary  -   Patient cleared with a COPD exacerbation today.  He is slightly hypoxic with the O2 of 93%.  Wheezing on exam.  I will check a chest x-ray to ensure we are not having any developing consolidation.  He does have a penicillin allergy.  I will start him on azithromycin Medrol Dosepak in his inhaled treatments as above    Relevant Medications     azithromycin (ZITHROMAX Z-EMILIANO) 250 MG tablet    inhalation spacing device    methylPREDNISolone (MEDROL DOSEPACK) 4 mg tablet    Other Relevant Orders    X-Ray Chest PA And Lateral              Health Maintenance reviewed, deferred to follow up.    Follow-up: Follow up Scheduled with PCP.  ______________________________________________________________________    Chief Complaint  Chief Complaint   Patient presents with    Nasal Congestion     Chest congestion, cough, night sweats sometimes, light head. Going on 1 year.        HPI  Srinivasan Munoz is a 64 y.o. male with multiple medical diagnoses as listed in the medical history and problem list that presents for cough and congestion for about a year.  Pt is new to me but is known to this clinic/department with their last appointment being June 2024 with his PCP.      Patient is here with his wife who also offers history.    He states he has been coughing for about a year.  He reports a cough has been getting progressively worse.  He is now having more productive cough.  Just here recently cough has become purulent in color.  He reports some subjective fevers at home.  Some night sweats as well.  No rigors.  No hemoptysis.     His wife offers history that he has actually been coughing for around 5 years.  It has been getting progressively worse in the last year.  She also notes he does not do as much activity as he used to because he has a top to take breasts.  The patient does acknowledge dyspnea.  Patient's wife does show that he does pursed lip breathing when he gets short of breath    Patient denies chest pain or orthopnea.        9/18/2024     1:35 PM   [facility] PEQ COPD ASSESSMENT   How often do you cough? 4   How often do you have phlegm (mucus) in your chest? 5   How often does your chest feel tight? 3   When you walk up a hill or one flight of stairs, how often are you breathless? 5   How often are you limited doing any activities at home? 3   How often are you  "confident leaving the house despite your lung condition? 0   How often do you sleep soundly? 2   How often do you have energy? 2   Total score 24            ROS  Review of Systems      Physical Exam  Vitals:    09/18/24 1323   BP: 128/68   BP Location: Left arm   Patient Position: Sitting   Pulse: (!) 59   SpO2: (!) 93%   Weight: 122.7 kg (270 lb 8.1 oz)   Height: 6' 1" (1.854 m)    Body mass index is 35.69 kg/m².  Weight: 122.7 kg (270 lb 8.1 oz)   Height: 6' 1" (185.4 cm)   Physical Exam  Constitutional:       General: He is not in acute distress.     Appearance: He is well-developed.   HENT:      Head: Normocephalic and atraumatic.   Eyes:      General: Lids are normal. No scleral icterus.     Conjunctiva/sclera: Conjunctivae normal.      Pupils: Pupils are equal, round, and reactive to light.   Neck:      Thyroid: No thyromegaly.      Vascular: No carotid bruit or JVD.   Cardiovascular:      Rate and Rhythm: Normal rate and regular rhythm.      Heart sounds: Normal heart sounds. No murmur heard.     No friction rub. No S3 or S4 sounds.   Pulmonary:      Effort: Pulmonary effort is normal.      Breath sounds: Wheezing (Bibasilar) and rhonchi (bibasilar) present. No rales.   Abdominal:      General: Bowel sounds are normal.      Palpations: Abdomen is soft.   Musculoskeletal:         General: No tenderness.      Cervical back: Full passive range of motion without pain and neck supple.      Right lower leg: No edema.      Left lower leg: No edema.   Skin:     General: Skin is warm and dry.      Findings: No rash.   Neurological:      Mental Status: He is alert and oriented to person, place, and time.   Psychiatric:         Speech: Speech normal.         Behavior: Behavior normal.         Thought Content: Thought content normal.               "

## 2024-09-18 NOTE — ASSESSMENT & PLAN NOTE
Patient report he has tried Chantix and Wellbutrin in the past without success.  He has been smoking since she was around 12 years old.  I counseled the patient on smoking cessation, risks associated with smoking, having a quit date, nicotine replacement, medications that can aid in cessation, and having a plan for future cravings.  The patient seems pre contemplative.  I counseled the patient for a total of 5 minute

## 2024-09-18 NOTE — TELEPHONE ENCOUNTER
No care due was identified.  Health [facility] Embedded Care Due Messages. Reference number: [id number].   9/18/2024 7:07:31 AM CDT

## 2024-09-18 NOTE — ASSESSMENT & PLAN NOTE
I will start the patient on Breo as this seems to be 1 of the medicines on his formulary.  I will also start him on albuterol for rescue.  I am giving him a spacer to help with this..  We discussed the need to follow up with his PCP about this to see how he is doing on his medications and to have dose adjustments as needed.    Of note is PFTs stated that he not have much improvement with bronchodilator.  We will need to monitor whether or not he needs a LAMA

## 2024-09-18 NOTE — Clinical Note
Alisha,   I saw this very nice patient of yours along with his wife.  He came in today complaining of cough for about a year that has been getting progressively worse.   He is currently having a COPD exacerbation today and I will treat him for this.  I just wanted to let you know we had a long discussion about what is COPD and how we are diagnosing him with this based upon his CT and PFT findings.  I asked him to keep his regular follow up with you as scheduled  Sincerely,  Juan

## 2024-09-27 ENCOUNTER — OFFICE VISIT (OUTPATIENT)
Dept: FAMILY MEDICINE | Facility: CLINIC | Age: 64
End: 2024-09-27
Payer: COMMERCIAL

## 2024-09-27 VITALS
SYSTOLIC BLOOD PRESSURE: 128 MMHG | HEIGHT: 73 IN | DIASTOLIC BLOOD PRESSURE: 64 MMHG | WEIGHT: 269.19 LBS | BODY MASS INDEX: 35.68 KG/M2 | TEMPERATURE: 98 F | HEART RATE: 65 BPM | OXYGEN SATURATION: 97 %

## 2024-09-27 DIAGNOSIS — M25.572 CHRONIC PAIN OF LEFT ANKLE: Chronic | ICD-10-CM

## 2024-09-27 DIAGNOSIS — E66.01 SEVERE OBESITY (BMI 35.0-39.9) WITH COMORBIDITY: ICD-10-CM

## 2024-09-27 DIAGNOSIS — R73.03 PREDIABETES: Primary | ICD-10-CM

## 2024-09-27 DIAGNOSIS — G47.33 OSA (OBSTRUCTIVE SLEEP APNEA): ICD-10-CM

## 2024-09-27 DIAGNOSIS — G89.29 CHRONIC PAIN OF LEFT ANKLE: Chronic | ICD-10-CM

## 2024-09-27 PROCEDURE — 99999 PR PBB SHADOW E&M-EST. PATIENT-LVL IV: CPT | Mod: PBBFAC,,, | Performed by: FAMILY MEDICINE

## 2024-09-27 RX ORDER — TIRZEPATIDE 5 MG/.5ML
5 INJECTION, SOLUTION SUBCUTANEOUS
Qty: 2 ML | Refills: 0 | Status: SHIPPED | OUTPATIENT
Start: 2024-09-27

## 2024-09-27 RX ORDER — TIRZEPATIDE 7.5 MG/.5ML
7.5 INJECTION, SOLUTION SUBCUTANEOUS
Qty: 2 ML | Refills: 3 | Status: SHIPPED | OUTPATIENT
Start: 2024-09-27

## 2024-09-27 RX ORDER — TIRZEPATIDE 2.5 MG/.5ML
2.5 INJECTION, SOLUTION SUBCUTANEOUS
Qty: 2 ML | Refills: 0 | Status: SHIPPED | OUTPATIENT
Start: 2024-09-27

## 2024-09-27 NOTE — PROGRESS NOTES
Routine Office Visit     Patient Name: Srinivasan Munoz    : 1960  MRN: [id number]    Subjective     History of Present Illness    CHIEF COMPLAINT:  Patient presents today for follow up.    WEIGHT MANAGEMENT:  He reports struggling with weight management for approximately 10 years, starting around age 54. He describes experiencing weight fluctuations, noting that 10 years ago, his weight used to fluctuate between 160 lbs in summer and 170 lbs in winter. He expresses frustration with his inability to lose weight despite his current eating habits and believes he may need medication to assist with weight loss.    Diets tried: Atkins and Weight Watchers, but states these were unsuccessful.     He is not currently following any specific diet plan.     2024 - 269  Weight goal 180    No personal history of pancreatitis   No personal or family history of MEN2 or medullary thyroid carcinoma     DIET AND EXERCISE:  He primarily consumes meat and vegetables, often eating only one meal per day, either lunch or dinner. He denies frequent snacking and reports drinking sugar-free energy drinks with 10 calories each. He does not eat breakfast. He recently purchased dumbbells for upper body exercises but has not yet incorporated them into a routine. 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 However, he reports limitations in exercise due to multiple lower extremity issues, including a bad ankle and knee, as well as a shattered heel from falling off a crane 15 years ago. He mentions a recent ankle injury that is currently causing pain.    SLEEP:  He takes melatonin to aid with sleep and reports snoring, which has been confirmed by his wife. His typical sleep schedule is from 8:30 PM to 3:30 AM, aiming for 7 hours of sleep. However, he acknowledges inconsistent sleep duration, sometimes getting only 4-5 hours. He denies using a CPAP machine and does not believe he  "has sleep apnea, despite it being noted in his chart.    SMOKING:  He currently smokes about 3/4 pack of cigarettes per day, having decreased from approximately 1.5 packs per day previously. He expresses a desire to continue reducing his cigarette consumption with the ultimate goal of quitting smoking entirely.      ROS:  General: no fever, no chills, no fatigue, no weight gain, no weight loss  Eyes: no vision changes, no redness, no discharge  ENT: no ear pain, no nasal congestion, no sore throat  Cardiovascular: no chest pain, no palpitations, no lower extremity edema  Respiratory: no cough, no shortness of breath  Gastrointestinal: no abdominal pain, no nausea, no vomiting, no diarrhea, no constipation, no blood in stool  Genitourinary: no dysuria, no hematuria, no frequency  Musculoskeletal: +joint pain, no muscle pain  Skin: no rash, no lesion  Neurological: no headache, no dizziness, no numbness, no tingling  Psychiatric: no anxiety, no depression, +sleep difficulty           Objective     /64   Pulse 65   Temp 98.4 °F (36.9 °C) (Oral)   Ht 6' 1" (1.854 m)   Wt 122.1 kg (269 lb 2.9 oz)   SpO2 97%   BMI 35.51 kg/m²   Physical Exam  Constitutional:       General: He is not in acute distress.     Appearance: Normal appearance. He is well-developed. He is obese. He is not diaphoretic.   HENT:      Head: Normocephalic and atraumatic.      Right Ear: External ear normal.      Left Ear: External ear normal.      Nose: Nose normal.   Eyes:      Extraocular Movements: Extraocular movements intact.      Conjunctiva/sclera: Conjunctivae normal.   Cardiovascular:      Rate and Rhythm: Normal rate and regular rhythm.      Pulses: Normal pulses.      Heart sounds: Normal heart sounds.   Pulmonary:      Effort: Pulmonary effort is normal. No respiratory distress.      Breath sounds: Normal breath sounds.   Abdominal:      General: There is no distension.      Palpations: Abdomen is soft.      Tenderness: There is " no abdominal tenderness.   Musculoskeletal:         General: Normal range of motion.      Cervical back: Normal range of motion.   Skin:     General: Skin is warm.      Findings: No rash.   Neurological:      Mental Status: He is alert and oriented to person, place, and time.   Psychiatric:         Mood and Affect: Mood normal.         Behavior: Behavior normal.         Thought Content: Thought content normal.         Judgment: Judgment normal.           Assessment     Assessment & Plan        SMOKING CESSATION:   Patient to continue efforts to reduce smoking, aiming to decrease from current 3/4 pack to 1/2 pack daily.        Problem List Items Addressed This Visit          Cardiac/Vascular    Atherosclerosis of aorta - Primary (Chronic)    Overview     Chest x-ray September 20, 2024            Endocrine    Prediabetes    Severe obesity (BMI 35.0-39.9) with comorbidity    Relevant Medications    tirzepatide, weight loss, (ZEPBOUND) 2.5 mg/0.5 mL PnIj    tirzepatide, weight loss, (ZEPBOUND) 5 mg/0.5 mL PnIj    tirzepatide, weight loss, (ZEPBOUND) 7.5 mg/0.5 mL PnIj     Emphasized the importance of sleep for metabolism and weight management.   Discussed the mechanism of action for GLP-1 receptor agonists in weight loss.   Discussed side effect of medication    Educated on the relationship between calorie intake, energy expenditure, and weight loss.   Provided information on portion control using hand measurements for different food groups.   Patient to incorporate upper body exercises using recently purchased dumbbells.   Recommend chair exercises if unable to perform weight-bearing activities.   Patient to aim for 7-8 hours of sleep per night.   Patient to modify eating pattern to smaller, more frequent meals instead of 1 large meal daily.   Started Zepbound 2.5 mg subcutaneous injection weekly for weight loss.   Administration technique discussed    Increase to 5 mg after 1 month, then to 7.5 mg after another  month.         Orthopedic    Chronic pain of left ankle (Chronic)    Overview     Hx of fracture  Followed by ortho - recommended surgery  Conservative treatment             Other    JOY (obstructive sleep apnea)  Consider referral to sleep medicine            This note was generated with the assistance of ambient listening technology. Verbal consent was obtained by the patient and accompanying visitor(s) for the recording of patient appointment to facilitate this note. I attest to having reviewed and edited the generated note for accuracy, though some syntax or spelling errors may persist. Please contact the author of this note for any clarification.

## 2024-10-01 ENCOUNTER — PATIENT MESSAGE (OUTPATIENT)
Dept: FAMILY MEDICINE | Facility: CLINIC | Age: 64
End: 2024-10-01
Payer: COMMERCIAL

## 2024-10-18 ENCOUNTER — PATIENT OUTREACH (OUTPATIENT)
Dept: ADMINISTRATIVE | Facility: HOSPITAL | Age: 64
End: 2024-10-18
Payer: COMMERCIAL

## 2024-10-20 DIAGNOSIS — M50.30 DEGENERATIVE CERVICAL DISC: ICD-10-CM

## 2024-10-20 DIAGNOSIS — M47.22 OSTEOARTHRITIS OF SPINE WITH RADICULOPATHY, CERVICAL REGION: ICD-10-CM

## 2024-10-20 DIAGNOSIS — G89.29 CHRONIC LEFT SHOULDER PAIN: ICD-10-CM

## 2024-10-20 DIAGNOSIS — M25.512 CHRONIC LEFT SHOULDER PAIN: ICD-10-CM

## 2024-10-20 DIAGNOSIS — Z79.891 CHRONIC USE OF OPIATE DRUG FOR THERAPEUTIC PURPOSE: ICD-10-CM

## 2024-10-21 RX ORDER — HYDROCODONE BITARTRATE AND ACETAMINOPHEN 7.5; 325 MG/1; MG/1
1 TABLET ORAL EVERY 12 HOURS PRN
Qty: 60 TABLET | Refills: 0 | Status: SHIPPED | OUTPATIENT
Start: 2024-10-21 | End: 2024-11-21

## 2024-10-21 NOTE — TELEPHONE ENCOUNTER
Care Due:                  Date            Visit Type   Department     Provider  --------------------------------------------------------------------------------                                             [facility]                              NEW PATIENT   MED/ INTERNAL  Last Visit: 09-      - BARIATRIC  MED/ PEDS      Yolanda Marques                              EP -         [facility]                              PRIMARY      MED/ INTERNAL  Next Visit: 10-      CARE ([facility])   MED/ PEDS      Yolanda Marques                                                            Last  Test          Frequency    Reason                     Performed    Due Date  --------------------------------------------------------------------------------    HBA1C.......  6 months...  tirzepatide,.............  10-   04-    [employer] Embedded Care Due Messages. Reference number: [id number].   10/20/2024 7:16:09 PM CDT

## 2024-10-22 ENCOUNTER — LAB VISIT (OUTPATIENT)
Dept: LAB | Facility: HOSPITAL | Age: 64
End: 2024-10-22
Attending: FAMILY MEDICINE
Payer: COMMERCIAL

## 2024-10-22 DIAGNOSIS — Z00.00 GENERAL MEDICAL EXAM: ICD-10-CM

## 2024-10-22 LAB
ALBUMIN SERPL BCP-MCNC: 4.1 G/DL (ref 3.5–5.2)
ALP SERPL-CCNC: 67 U/L (ref 40–150)
ALT SERPL W/O P-5'-P-CCNC: 24 U/L (ref 10–44)
ANION GAP SERPL CALC-SCNC: 10 MMOL/L (ref 8–16)
AST SERPL-CCNC: 27 U/L (ref 10–40)
BASOPHILS # BLD AUTO: 0.16 K/UL (ref 0–0.2)
BASOPHILS NFR BLD: 1.5 % (ref 0–1.9)
BILIRUB SERPL-MCNC: 0.6 MG/DL (ref 0.1–1)
BUN SERPL-MCNC: 7 MG/DL (ref 8–23)
CALCIUM SERPL-MCNC: 10.2 MG/DL (ref 8.7–10.5)
CHLORIDE SERPL-SCNC: 107 MMOL/L (ref 95–110)
CHOLEST SERPL-MCNC: 180 MG/DL (ref 120–199)
CHOLEST/HDLC SERPL: 3.8 {RATIO} (ref 2–5)
CO2 SERPL-SCNC: 26 MMOL/L (ref 23–29)
CREAT SERPL-MCNC: 0.8 MG/DL (ref 0.5–1.4)
DIFFERENTIAL METHOD BLD: ABNORMAL
EOSINOPHIL # BLD AUTO: 0.1 K/UL (ref 0–0.5)
EOSINOPHIL NFR BLD: 1 % (ref 0–8)
ERYTHROCYTE [DISTWIDTH] IN BLOOD BY AUTOMATED COUNT: 14.2 % (ref 11.5–14.5)
EST. GFR  (NO RACE VARIABLE): >60 ML/MIN/1.73 M^2
ESTIMATED AVG GLUCOSE: 111 MG/DL (ref 68–131)
GLUCOSE SERPL-MCNC: 92 MG/DL (ref 70–110)
HBA1C MFR BLD: 5.5 % (ref 4–5.6)
HCT VFR BLD AUTO: 48.3 % (ref 40–54)
HDLC SERPL-MCNC: 47 MG/DL (ref 40–75)
HDLC SERPL: 26.1 % (ref 20–50)
HGB BLD-MCNC: 15.1 G/DL (ref 14–18)
IMM GRANULOCYTES # BLD AUTO: 0.03 K/UL (ref 0–0.04)
IMM GRANULOCYTES NFR BLD AUTO: 0.3 % (ref 0–0.5)
LDLC SERPL CALC-MCNC: 115.2 MG/DL (ref 63–159)
LYMPHOCYTES # BLD AUTO: 2.7 K/UL (ref 1–4.8)
LYMPHOCYTES NFR BLD: 25.8 % (ref 18–48)
MCH RBC QN AUTO: 28.3 PG (ref 27–31)
MCHC RBC AUTO-ENTMCNC: 31.3 G/DL (ref 32–36)
MCV RBC AUTO: 90 FL (ref 82–98)
MONOCYTES # BLD AUTO: 1.2 K/UL (ref 0.3–1)
MONOCYTES NFR BLD: 10.9 % (ref 4–15)
NEUTROPHILS # BLD AUTO: 6.4 K/UL (ref 1.8–7.7)
NEUTROPHILS NFR BLD: 60.5 % (ref 38–73)
NONHDLC SERPL-MCNC: 133 MG/DL
NRBC BLD-RTO: 0 /100 WBC
PLATELET # BLD AUTO: 398 K/UL (ref 150–450)
PMV BLD AUTO: 10 FL (ref 9.2–12.9)
POTASSIUM SERPL-SCNC: 4.1 MMOL/L (ref 3.5–5.1)
PROT SERPL-MCNC: 6.8 G/DL (ref 6–8.4)
RBC # BLD AUTO: 5.34 M/UL (ref 4.6–6.2)
SODIUM SERPL-SCNC: 143 MMOL/L (ref 136–145)
TRIGL SERPL-MCNC: 89 MG/DL (ref 30–150)
TSH SERPL DL<=0.005 MIU/L-ACNC: 1.74 UIU/ML (ref 0.4–4)
WBC # BLD AUTO: 10.56 K/UL (ref 3.9–12.7)

## 2024-10-22 PROCEDURE — 84443 ASSAY THYROID STIM HORMONE: CPT | Performed by: FAMILY MEDICINE

## 2024-10-22 PROCEDURE — 85025 COMPLETE CBC W/AUTO DIFF WBC: CPT | Performed by: FAMILY MEDICINE

## 2024-10-22 PROCEDURE — 80061 LIPID PANEL: CPT | Performed by: FAMILY MEDICINE

## 2024-10-22 PROCEDURE — 83036 HEMOGLOBIN GLYCOSYLATED A1C: CPT | Performed by: FAMILY MEDICINE

## 2024-10-22 PROCEDURE — 80053 COMPREHEN METABOLIC PANEL: CPT | Performed by: FAMILY MEDICINE

## 2024-10-29 ENCOUNTER — OFFICE VISIT (OUTPATIENT)
Dept: FAMILY MEDICINE | Facility: CLINIC | Age: 64
End: 2024-10-29
Payer: COMMERCIAL

## 2024-10-29 VITALS
BODY MASS INDEX: 34.28 KG/M2 | HEIGHT: 73 IN | OXYGEN SATURATION: 96 % | DIASTOLIC BLOOD PRESSURE: 64 MMHG | WEIGHT: 258.63 LBS | HEART RATE: 66 BPM | TEMPERATURE: 99 F | SYSTOLIC BLOOD PRESSURE: 130 MMHG

## 2024-10-29 DIAGNOSIS — J43.2 CENTRILOBULAR EMPHYSEMA: Primary | Chronic | ICD-10-CM

## 2024-10-29 DIAGNOSIS — E66.01 SEVERE OBESITY (BMI 35.0-39.9) WITH COMORBIDITY: ICD-10-CM

## 2024-10-29 DIAGNOSIS — M25.512 CHRONIC LEFT SHOULDER PAIN: ICD-10-CM

## 2024-10-29 DIAGNOSIS — G89.29 CHRONIC LEFT SHOULDER PAIN: ICD-10-CM

## 2024-10-29 DIAGNOSIS — M47.22 OSTEOARTHRITIS OF SPINE WITH RADICULOPATHY, CERVICAL REGION: ICD-10-CM

## 2024-10-29 DIAGNOSIS — I70.0 ATHEROSCLEROSIS OF AORTA: Chronic | ICD-10-CM

## 2024-10-29 DIAGNOSIS — R05.9 COUGH IN ADULT: ICD-10-CM

## 2024-10-29 DIAGNOSIS — Z79.891 CHRONIC USE OF OPIATE DRUG FOR THERAPEUTIC PURPOSE: ICD-10-CM

## 2024-10-29 DIAGNOSIS — M50.30 DEGENERATIVE CERVICAL DISC: ICD-10-CM

## 2024-10-29 PROCEDURE — 1160F RVW MEDS BY RX/DR IN RCRD: CPT | Mod: CPTII,S$GLB,, | Performed by: FAMILY MEDICINE

## 2024-10-29 PROCEDURE — 3008F BODY MASS INDEX DOCD: CPT | Mod: CPTII,S$GLB,, | Performed by: FAMILY MEDICINE

## 2024-10-29 PROCEDURE — 1159F MED LIST DOCD IN RCRD: CPT | Mod: CPTII,S$GLB,, | Performed by: FAMILY MEDICINE

## 2024-10-29 PROCEDURE — 99999 PR PBB SHADOW E&M-EST. PATIENT-LVL IV: CPT | Mod: PBBFAC,,, | Performed by: FAMILY MEDICINE

## 2024-10-29 PROCEDURE — 3075F SYST BP GE 130 - 139MM HG: CPT | Mod: CPTII,S$GLB,, | Performed by: FAMILY MEDICINE

## 2024-10-29 PROCEDURE — 3044F HG A1C LEVEL LT 7.0%: CPT | Mod: CPTII,S$GLB,, | Performed by: FAMILY MEDICINE

## 2024-10-29 PROCEDURE — G2211 COMPLEX E/M VISIT ADD ON: HCPCS | Mod: S$GLB,,, | Performed by: FAMILY MEDICINE

## 2024-10-29 PROCEDURE — 3078F DIAST BP <80 MM HG: CPT | Mod: CPTII,S$GLB,, | Performed by: FAMILY MEDICINE

## 2024-10-29 PROCEDURE — 99214 OFFICE O/P EST MOD 30 MIN: CPT | Mod: S$GLB,,, | Performed by: FAMILY MEDICINE

## 2024-10-29 RX ORDER — HYDROCODONE BITARTRATE AND ACETAMINOPHEN 7.5; 325 MG/1; MG/1
1 TABLET ORAL EVERY 12 HOURS PRN
Qty: 60 TABLET | Refills: 0 | Status: SHIPPED | OUTPATIENT
Start: 2024-12-17 | End: 2025-01-15

## 2024-10-29 RX ORDER — HYDROCODONE BITARTRATE AND ACETAMINOPHEN 7.5; 325 MG/1; MG/1
1 TABLET ORAL EVERY 12 HOURS PRN
Qty: 60 TABLET | Refills: 0 | Status: SHIPPED | OUTPATIENT
Start: 2025-01-15 | End: 2025-02-13

## 2024-10-29 RX ORDER — PROMETHAZINE HYDROCHLORIDE AND DEXTROMETHORPHAN HYDROBROMIDE 6.25; 15 MG/5ML; MG/5ML
5 SYRUP ORAL EVERY 6 HOURS PRN
Qty: 180 ML | Refills: 0 | Status: SHIPPED | OUTPATIENT
Start: 2024-10-29 | End: 2024-11-08

## 2024-10-29 RX ORDER — HYDROCODONE BITARTRATE AND ACETAMINOPHEN 7.5; 325 MG/1; MG/1
1 TABLET ORAL EVERY 12 HOURS PRN
Qty: 60 TABLET | Refills: 0 | Status: SHIPPED | OUTPATIENT
Start: 2024-11-18 | End: 2024-12-17

## 2024-12-05 DIAGNOSIS — M54.12 CERVICAL RADICULOPATHY: ICD-10-CM

## 2024-12-06 NOTE — TELEPHONE ENCOUNTER
No care due was identified.  [facility] Embedded Care Due Messages. Reference number: [id number].   12/05/2024 8:49:22 PM CST

## 2024-12-09 RX ORDER — CELECOXIB 200 MG/1
200 CAPSULE ORAL 2 TIMES DAILY
Qty: 180 CAPSULE | Refills: 0 | Status: SHIPPED | OUTPATIENT
Start: 2024-12-09 | End: 2024-12-11 | Stop reason: SDUPTHER

## 2024-12-09 NOTE — TELEPHONE ENCOUNTER
Refill Routing Note   Medication(s) are not appropriate for processing by [facility] for the following reason(s):        Outside of protocol    [facility] action(s):  Route               Appointments  past 12m or future 3m with PCP    Date Provider   Last Visit   10/29/2024 Yolanda Marques MD   Next Visit   3/6/2025 Yolanda Marques MD   ED visits in past 90 days: 0        Note composed:2:28 PM 12/09/2024

## 2024-12-11 DIAGNOSIS — M54.12 CERVICAL RADICULOPATHY: ICD-10-CM

## 2024-12-11 NOTE — TELEPHONE ENCOUNTER
No care due was identified.  Health [facility] Embedded Care Due Messages. Reference number: [id number].   12/11/2024 4:16:12 PM CST

## 2024-12-13 RX ORDER — CELECOXIB 200 MG/1
200 CAPSULE ORAL 2 TIMES DAILY
Qty: 180 CAPSULE | Refills: 1 | Status: SHIPPED | OUTPATIENT
Start: 2024-12-13

## 2024-12-13 NOTE — TELEPHONE ENCOUNTER
Refill Routing Note   Medication(s) are not appropriate for processing by [facility] for the following reason(s):        Outside of protocol    [facility] action(s):  Route               Appointments  past 12m or future 3m with PCP    Date Provider   Last Visit   10/29/2024 Yolanda Marques MD   Next Visit   3/6/2025 Yolanda Marques MD   ED visits in past 90 days: 0        Note composed:9:17 AM 12/13/2024

## 2024-12-25 DIAGNOSIS — M47.22 OSTEOARTHRITIS OF SPINE WITH RADICULOPATHY, CERVICAL REGION: ICD-10-CM

## 2024-12-25 DIAGNOSIS — Z79.891 CHRONIC USE OF OPIATE DRUG FOR THERAPEUTIC PURPOSE: ICD-10-CM

## 2024-12-25 DIAGNOSIS — M25.512 CHRONIC LEFT SHOULDER PAIN: ICD-10-CM

## 2024-12-25 DIAGNOSIS — G89.29 CHRONIC LEFT SHOULDER PAIN: ICD-10-CM

## 2024-12-25 DIAGNOSIS — M50.30 DEGENERATIVE CERVICAL DISC: ICD-10-CM

## 2024-12-26 NOTE — TELEPHONE ENCOUNTER
No care due was identified.  Health [facility] Embedded Care Due Messages. Reference number: [id number].   12/25/2024 9:53:03 PM CST

## 2024-12-28 RX ORDER — HYDROCODONE BITARTRATE AND ACETAMINOPHEN 7.5; 325 MG/1; MG/1
1 TABLET ORAL EVERY 12 HOURS PRN
Qty: 60 TABLET | Refills: 0 | Status: SHIPPED | OUTPATIENT
Start: 2024-12-28 | End: 2025-02-01

## 2025-01-25 DIAGNOSIS — J43.2 CENTRILOBULAR EMPHYSEMA: Chronic | ICD-10-CM

## 2025-01-25 NOTE — TELEPHONE ENCOUNTER
No care due was identified.  [facility] Embedded Care Due Messages. Reference number: [id number].   1/25/2025 8:33:03 AM CST

## 2025-01-26 NOTE — TELEPHONE ENCOUNTER
Refill Routing Note   Medication(s) are not appropriate for processing by [facility] for the following reason(s):        No active prescription written by provider    [facility] action(s):  Defer      Medication Therapy Plan: Last ordered: 9/18/24 by Aaron Del Valle MD. Recent OV but no current order by PCP      Appointments  past 12m or future 3m with PCP    Date Provider   Last Visit   10/29/2024 Yolanda Marques MD   Next Visit   3/6/2025 Yolanda Marques MD   ED visits in past 90 days: 0        Note composed:12:46 PM 01/26/2025

## 2025-01-27 RX ORDER — FLUTICASONE FUROATE AND VILANTEROL 100; 25 UG/1; UG/1
1 POWDER RESPIRATORY (INHALATION) DAILY
Qty: 180 EACH | Refills: 3 | Status: SHIPPED | OUTPATIENT
Start: 2025-01-27

## 2025-02-02 DIAGNOSIS — G89.29 CHRONIC LEFT SHOULDER PAIN: ICD-10-CM

## 2025-02-02 DIAGNOSIS — M25.512 CHRONIC LEFT SHOULDER PAIN: ICD-10-CM

## 2025-02-02 DIAGNOSIS — M50.30 DEGENERATIVE CERVICAL DISC: ICD-10-CM

## 2025-02-02 DIAGNOSIS — M47.22 OSTEOARTHRITIS OF SPINE WITH RADICULOPATHY, CERVICAL REGION: ICD-10-CM

## 2025-02-02 DIAGNOSIS — Z79.891 CHRONIC USE OF OPIATE DRUG FOR THERAPEUTIC PURPOSE: ICD-10-CM

## 2025-02-02 RX ORDER — HYDROCODONE BITARTRATE AND ACETAMINOPHEN 7.5; 325 MG/1; MG/1
1 TABLET ORAL EVERY 12 HOURS PRN
Qty: 60 TABLET | Refills: 0 | Status: CANCELLED | OUTPATIENT
Start: 2025-02-02 | End: 2025-03-03

## 2025-02-03 NOTE — TELEPHONE ENCOUNTER
No care due was identified.  [facility] Embedded Care Due Messages. Reference number: [id number].   2/02/2025 8:33:16 PM CST

## 2025-02-06 DIAGNOSIS — G89.29 CHRONIC LEFT SHOULDER PAIN: ICD-10-CM

## 2025-02-06 DIAGNOSIS — M25.512 CHRONIC LEFT SHOULDER PAIN: ICD-10-CM

## 2025-02-06 DIAGNOSIS — M47.22 OSTEOARTHRITIS OF SPINE WITH RADICULOPATHY, CERVICAL REGION: ICD-10-CM

## 2025-02-06 DIAGNOSIS — Z79.891 CHRONIC USE OF OPIATE DRUG FOR THERAPEUTIC PURPOSE: ICD-10-CM

## 2025-02-06 DIAGNOSIS — M50.30 DEGENERATIVE CERVICAL DISC: ICD-10-CM

## 2025-02-06 NOTE — TELEPHONE ENCOUNTER
No care due was identified.  [facility] Embedded Care Due Messages. Reference number: [id number].   2/06/2025 3:28:03 PM CST

## 2025-02-07 ENCOUNTER — TELEPHONE (OUTPATIENT)
Dept: FAMILY MEDICINE | Facility: CLINIC | Age: 65
End: 2025-02-07
Payer: COMMERCIAL

## 2025-02-07 RX ORDER — HYDROCODONE BITARTRATE AND ACETAMINOPHEN 7.5; 325 MG/1; MG/1
1 TABLET ORAL EVERY 12 HOURS PRN
Qty: 60 TABLET | Refills: 0 | Status: SHIPPED | OUTPATIENT
Start: 2025-02-07 | End: 2025-03-13

## 2025-02-07 NOTE — TELEPHONE ENCOUNTER
----- Message from Estefanía sent at 2/6/2025  3:30 PM CST -----  Regarding: patient call back  Type: Patient Call Back    Who called: Self     What is the request in detail: asked for a status update for his pain medicine     Can the clinic reply by [facility]? No     Would the patient rather a call back or a response via [facility]? Call     Best call back number: .[phone]

## 2025-02-15 DIAGNOSIS — M50.30 DEGENERATIVE CERVICAL DISC: ICD-10-CM

## 2025-02-15 NOTE — TELEPHONE ENCOUNTER
No care due was identified.  [facility] Embedded Care Due Messages. Reference number: [id number].   2/15/2025 10:30:30 AM CST

## 2025-02-19 RX ORDER — GABAPENTIN 800 MG/1
800 TABLET ORAL 3 TIMES DAILY
Qty: 270 TABLET | Refills: 1 | Status: SHIPPED | OUTPATIENT
Start: 2025-02-19

## 2025-03-06 ENCOUNTER — OFFICE VISIT (OUTPATIENT)
Dept: FAMILY MEDICINE | Facility: CLINIC | Age: 65
End: 2025-03-06
Payer: COMMERCIAL

## 2025-03-06 VITALS
SYSTOLIC BLOOD PRESSURE: 116 MMHG | HEIGHT: 73 IN | DIASTOLIC BLOOD PRESSURE: 66 MMHG | WEIGHT: 242.5 LBS | OXYGEN SATURATION: 96 % | BODY MASS INDEX: 32.14 KG/M2 | HEART RATE: 61 BPM

## 2025-03-06 DIAGNOSIS — E66.01 SEVERE OBESITY (BMI 35.0-39.9) WITH COMORBIDITY: ICD-10-CM

## 2025-03-06 DIAGNOSIS — J43.2 CENTRILOBULAR EMPHYSEMA: ICD-10-CM

## 2025-03-06 DIAGNOSIS — M47.22 OSTEOARTHRITIS OF SPINE WITH RADICULOPATHY, CERVICAL REGION: ICD-10-CM

## 2025-03-06 DIAGNOSIS — Z79.891 CHRONIC USE OF OPIATE DRUG FOR THERAPEUTIC PURPOSE: ICD-10-CM

## 2025-03-06 DIAGNOSIS — M54.12 CERVICAL RADICULOPATHY: ICD-10-CM

## 2025-03-06 DIAGNOSIS — I70.0 ATHEROSCLEROSIS OF AORTA: Primary | Chronic | ICD-10-CM

## 2025-03-06 DIAGNOSIS — R73.03 PREDIABETES: ICD-10-CM

## 2025-03-06 DIAGNOSIS — M50.30 DEGENERATIVE CERVICAL DISC: ICD-10-CM

## 2025-03-06 DIAGNOSIS — G89.29 CHRONIC LEFT SHOULDER PAIN: ICD-10-CM

## 2025-03-06 DIAGNOSIS — M25.512 CHRONIC LEFT SHOULDER PAIN: ICD-10-CM

## 2025-03-06 DIAGNOSIS — E78.5 HYPERLIPIDEMIA, UNSPECIFIED HYPERLIPIDEMIA TYPE: ICD-10-CM

## 2025-03-06 PROCEDURE — 99999 PR PBB SHADOW E&M-EST. PATIENT-LVL III: CPT | Mod: PBBFAC,,, | Performed by: FAMILY MEDICINE

## 2025-03-06 RX ORDER — HYDROCODONE BITARTRATE AND ACETAMINOPHEN 7.5; 325 MG/1; MG/1
1 TABLET ORAL EVERY 12 HOURS PRN
Qty: 60 TABLET | Refills: 0 | Status: SHIPPED | OUTPATIENT
Start: 2025-03-11 | End: 2025-04-09

## 2025-03-06 RX ORDER — HYDROCODONE BITARTRATE AND ACETAMINOPHEN 7.5; 325 MG/1; MG/1
1 TABLET ORAL EVERY 12 HOURS PRN
Qty: 60 TABLET | Refills: 0 | Status: SHIPPED | OUTPATIENT
Start: 2025-04-09 | End: 2025-05-08

## 2025-03-06 RX ORDER — HYDROCODONE BITARTRATE AND ACETAMINOPHEN 7.5; 325 MG/1; MG/1
1 TABLET ORAL EVERY 12 HOURS PRN
Qty: 60 TABLET | Refills: 0 | Status: SHIPPED | OUTPATIENT
Start: 2025-05-08 | End: 2025-06-06

## 2025-03-06 NOTE — PROGRESS NOTES
"Routine Office Visit     Patient Name: Srinivasan Munoz    : 1960  MRN: [id number]    Subjective     History of Present Illness    CHIEF COMPLAINT:  Shoulder pain, back pain and ankle pain - chronic condition for patient - Patient has been evaluated by ortho and pain management. He states pain started approximately 15 years ago after he fell off a crane at work. He fractured his ankle in multiple places. 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 He now has severe pain. It limits his range of motion. Patient is currently on gabapentin and celebrex and continues to have severe pain. He takes hydrocodone for severe pain. He is requesting refill for medication. He does not abuse medication. Medication helps with to continue to work   Stress - with work and family. His daughter is going through process of adoption of her foster children.   Weight - Patient has been working on improving his diet. He remains active and is busy at work.     2024 - 269  10/29/2024 - 258  3/9/2025 - 242  Weight goal 180      ROS:  General: no fever, no chills, no fatigue, no weight gain, no weight loss  Eyes: no vision changes, no redness, no discharge  ENT: no ear pain, no nasal congestion, no sore throat  Cardiovascular: no chest pain, no palpitations, no lower extremity edema  Respiratory: no cough, no shortness of breath  Gastrointestinal: no abdominal pain, no nausea, no vomiting, no diarrhea, no constipation, no blood in stool  Genitourinary: no dysuria, no hematuria, no frequency  Musculoskeletal: no joint pain, no muscle pain  Skin: no rash, no lesion  Neurological: no headache, no dizziness, no numbness, no tingling  Psychiatric: no anxiety, no depression, no sleep difficulty           Objective     /66 (BP Location: Left arm, Patient Position: Sitting)   Pulse 61   Ht 6' 1" (1.854 m)   Wt 110 kg (242 lb 8.1 oz)   SpO2 96%   BMI 31.99 kg/m²   Physical " Exam  Constitutional:       Appearance: He is well-developed.   HENT:      Head: Normocephalic and atraumatic.   Eyes:      Conjunctiva/sclera: Conjunctivae normal.      Pupils: Pupils are equal, round, and reactive to light.   Neck:      Thyroid: No thyromegaly.      Vascular: No JVD.   Cardiovascular:      Rate and Rhythm: Normal rate and regular rhythm.      Heart sounds: Normal heart sounds.   Pulmonary:      Effort: Pulmonary effort is normal.      Breath sounds: Normal breath sounds. No wheezing.   Abdominal:      General: Bowel sounds are normal. There is no distension.      Palpations: Abdomen is soft.      Tenderness: There is no abdominal tenderness. There is no guarding.   Musculoskeletal:         General: Normal range of motion.      Cervical back: Normal range of motion and neck supple.   Lymphadenopathy:      Cervical: No cervical adenopathy.   Skin:     General: Skin is warm and dry.   Neurological:      Mental Status: He is alert and oriented to person, place, and time.   Psychiatric:         Behavior: Behavior normal.           Assessment     Assessment & Plan      OTHER INSTRUCTIONS:   Acknowledged patient's time constraints due to family , adjusting visit accordingly.    FOLLOW UP:   Follow up in approximately 4 months.   Check online portal for specific appointment details.         Problem List Items Addressed This Visit          Neuro    Cervical radiculopathy    Degenerative cervical disc    Overview   With radiculopathy  Pt previously on gabapentin; would like to restart         Relevant Medications    HYDROcodone-acetaminophen (NORCO) 7.5-325 mg per tablet (Start on 3/11/2025)    HYDROcodone-acetaminophen (NORCO) 7.5-325 mg per tablet (Start on 2025)    HYDROcodone-acetaminophen (NORCO) 7.5-325 mg per tablet (Start on 2025)    Osteoarthritis of spine with radiculopathy, cervical region    Overview   Patient has chronic pain involving the back and shoulder.  History of trauma?  No.  Onset: Approximately few years ago.  Frequency: constant.  Progression since onset: unchanged.  Pain quality: 5/10.  Current treatment: Lortab ( Hydrocodone/Acetaminophen) and Norco, celebrex, gabapentin   Improvement on current treatment: moderate  Treatments tried:  OTC acetaminophen, OTC analgesics, OTC ibuprofen, and Norco, ; Therapy: Physical Therapy; Injections: None - with mild improvement  Associated symptoms: no other symptoms  Previous imaging: X-ray, PET scan, and None   Substance abuse history:  None  Social History: None         Relevant Medications    HYDROcodone-acetaminophen (NORCO) 7.5-325 mg per tablet (Start on 3/11/2025)    HYDROcodone-acetaminophen (NORCO) 7.5-325 mg per tablet (Start on 4/9/2025)    HYDROcodone-acetaminophen (NORCO) 7.5-325 mg per tablet (Start on 5/8/2025)       Psychiatric    Chronic use of opiate drug for therapeutic purpose    Relevant Medications    HYDROcodone-acetaminophen (NORCO) 7.5-325 mg per tablet (Start on 3/11/2025)    HYDROcodone-acetaminophen (NORCO) 7.5-325 mg per tablet (Start on 4/9/2025)    HYDROcodone-acetaminophen (NORCO) 7.5-325 mg per tablet (Start on 5/8/2025)       Pulmonary    Centrilobular emphysema (Chronic)    Overview   PFT 2023 mild-to-moderate obstruction  Low-dose CT May 20, 2024 with paraseptal and centrilobular emphysema            Cardiac/Vascular    Atherosclerosis of aorta - Primary (Chronic)    Overview   Chest x-ray September 20, 2024  Patient with Atherosclerosis of the Aorta.  Stable/asymptomatic. Currently stable on lipid and b/p monitoring.    Consider statin           Hyperlipidemia  The current medical regimen is effective;  continue present plan and medications.          Endocrine    Prediabetes    Severe obesity (BMI 35.0-39.9) with comorbidity  The patient is asked to make an attempt to improve diet and exercise patterns to aid in medical management of this problem.          Orthopedic    Chronic left shoulder pain     Relevant Medications    HYDROcodone-acetaminophen (NORCO) 7.5-325 mg per tablet (Start on 3/11/2025)    HYDROcodone-acetaminophen (NORCO) 7.5-325 mg per tablet (Start on 4/9/2025)    HYDROcodone-acetaminophen (NORCO) 7.5-325 mg per tablet (Start on 5/8/2025)          This note was generated with the assistance of ambient listening technology. Verbal consent was obtained by the patient and accompanying visitor(s) for the recording of patient appointment to facilitate this note. I attest to having reviewed and edited the generated note for accuracy, though some syntax or spelling errors may persist. Please contact the author of this note for any clarification.

## 2025-05-01 ENCOUNTER — TELEPHONE (OUTPATIENT)
Dept: FAMILY MEDICINE | Facility: CLINIC | Age: 65
End: 2025-05-01
Payer: COMMERCIAL

## 2025-05-01 NOTE — TELEPHONE ENCOUNTER
----- Message from Ana Laura sent at 5/1/2025 10:20 AM CDT -----  Contact: Pt  [phone]  Requesting an RX refill or new RX.Is this a refill or new RX: RefillRX name and strength (copy/paste from chart):  promethazine-dextromethorphan (PROMETHAZINE-DM) 6.25-15 mg/5 mL SyrpIs this a 30 day or 90 day RX: 30Pharmacy name and phone # (copy/paste from chart):  [facility] [place] [id number] [id number]: [phone] Fax: [phone] call and advise.Thank You

## 2025-05-08 ENCOUNTER — TELEPHONE (OUTPATIENT)
Dept: FAMILY MEDICINE | Facility: CLINIC | Age: 65
End: 2025-05-08
Payer: COMMERCIAL

## 2025-05-08 ENCOUNTER — PATIENT MESSAGE (OUTPATIENT)
Dept: FAMILY MEDICINE | Facility: CLINIC | Age: 65
End: 2025-05-08
Payer: COMMERCIAL

## 2025-05-08 NOTE — TELEPHONE ENCOUNTER
----- Message from Ana Laura sent at 5/8/2025  9:58 AM CDT -----  Contact: Pt      [phone]  2nd RequestRequesting an RX refill or new RX.Is this a refill or new RX: RefillRX name and strength (copy/paste from chart):  promethazine-dextromethorphan (PROMETHAZINE-DM) 6.25-15 mg/5 mL SyrpIs this a 30 day or 90 day RX: 30Pharmacy name and phone # (copy/paste from chart):  [facility] [place] [id number] [address]Lytvg: [phone] Fax: [phone] call and advise.Thank You

## 2025-05-08 NOTE — TELEPHONE ENCOUNTER
----- Message from Med Assistant King sent at 2025 11:22 AM CDT -----  Patient is requesting  medication (promethazine)  ----- Message -----  From: Manuelito Oh  Sent: 2025  10:05 AM CDT  To: Jerald Guerreor Staff    Who Called:wife-kimRefill or New Rx:refill /newRX Name and Strength:promethazine -dextrom ethorphan Is this a 30 day or 90 day RX:Preferred Pharmacy with phone number:- [facility] [place] the patient rather a call back or a response via [facility]?call Best Call Back Number:.[phone] Information:

## 2025-06-01 DIAGNOSIS — M54.12 CERVICAL RADICULOPATHY: ICD-10-CM

## 2025-06-01 DIAGNOSIS — M47.22 OSTEOARTHRITIS OF SPINE WITH RADICULOPATHY, CERVICAL REGION: ICD-10-CM

## 2025-06-01 DIAGNOSIS — Z79.891 CHRONIC USE OF OPIATE DRUG FOR THERAPEUTIC PURPOSE: ICD-10-CM

## 2025-06-01 DIAGNOSIS — M25.512 CHRONIC LEFT SHOULDER PAIN: ICD-10-CM

## 2025-06-01 DIAGNOSIS — G89.29 CHRONIC LEFT SHOULDER PAIN: ICD-10-CM

## 2025-06-01 DIAGNOSIS — M50.30 DEGENERATIVE CERVICAL DISC: ICD-10-CM

## 2025-06-02 RX ORDER — HYDROCODONE BITARTRATE AND ACETAMINOPHEN 7.5; 325 MG/1; MG/1
1 TABLET ORAL EVERY 12 HOURS PRN
Qty: 60 TABLET | Refills: 0 | Status: SHIPPED | OUTPATIENT
Start: 2025-06-02 | End: 2025-07-03

## 2025-06-02 RX ORDER — CELECOXIB 200 MG/1
200 CAPSULE ORAL 2 TIMES DAILY
Qty: 180 CAPSULE | Refills: 1 | Status: SHIPPED | OUTPATIENT
Start: 2025-06-02

## (undated) DEVICE — SOL BETADINE 5%

## (undated) DEVICE — GLOVE BIOGEL SKINSENSE PI 6.5

## (undated) DEVICE — FORCEP GRIESHABER MAXGRIP 25G

## (undated) DEVICE — Device

## (undated) DEVICE — SOL WATER STRL IRR 1000ML

## (undated) DEVICE — GLOVE BIOGEL SKINSENSE PI 7.5

## (undated) DEVICE — CASSETTE INFINITI

## (undated) DEVICE — SOL POVIDONE SCRUB IODINE 4 OZ

## (undated) DEVICE — SCRUB 10% POVIDONE IODINE 4OZ